# Patient Record
Sex: MALE | Race: WHITE | NOT HISPANIC OR LATINO | Employment: OTHER | URBAN - METROPOLITAN AREA
[De-identification: names, ages, dates, MRNs, and addresses within clinical notes are randomized per-mention and may not be internally consistent; named-entity substitution may affect disease eponyms.]

---

## 2017-09-28 ENCOUNTER — APPOINTMENT (OUTPATIENT)
Dept: CARDIOLOGY | Facility: HOSPITAL | Age: 67
End: 2017-09-28
Attending: INTERNAL MEDICINE

## 2017-09-28 ENCOUNTER — HOSPITAL ENCOUNTER (INPATIENT)
Facility: HOSPITAL | Age: 67
LOS: 2 days | Discharge: HOME | End: 2017-09-30
Attending: INTERNAL MEDICINE | Admitting: INTERNAL MEDICINE

## 2017-09-28 PROBLEM — I21.29 ACUTE MI, LATERAL WALL (HCC): Status: ACTIVE | Noted: 2017-09-28

## 2017-09-28 LAB
ACT BLD: 235 SECONDS (ref 82–152)
ACT BLD: 274 SECONDS (ref 82–152)
ACT BLD: 279 SECONDS (ref 82–152)
ALBUMIN SERPL-MCNC: 3.6 G/DL (ref 3.5–5.2)
ALBUMIN/GLOB SERPL: 1.5 G/DL
ALP SERPL-CCNC: 42 U/L (ref 39–117)
ALT SERPL W P-5'-P-CCNC: 24 U/L (ref 1–41)
ANION GAP SERPL CALCULATED.3IONS-SCNC: 14.1 MMOL/L
APTT PPP: >200 SECONDS (ref 22.7–35.4)
AST SERPL-CCNC: 29 U/L (ref 1–40)
BILIRUB SERPL-MCNC: 0.5 MG/DL (ref 0.1–1.2)
BUN BLD-MCNC: 25 MG/DL (ref 8–23)
BUN/CREAT SERPL: 20.7 (ref 7–25)
CALCIUM SPEC-SCNC: 8.2 MG/DL (ref 8.6–10.5)
CHLORIDE SERPL-SCNC: 100 MMOL/L (ref 98–107)
CO2 SERPL-SCNC: 20.9 MMOL/L (ref 22–29)
CREAT BLD-MCNC: 1.21 MG/DL (ref 0.76–1.27)
DEPRECATED RDW RBC AUTO: 40.6 FL (ref 37–54)
ERYTHROCYTE [DISTWIDTH] IN BLOOD BY AUTOMATED COUNT: 12.2 % (ref 11.5–14.5)
GFR SERPL CREATININE-BSD FRML MDRD: 60 ML/MIN/1.73
GLOBULIN UR ELPH-MCNC: 2.4 GM/DL
GLUCOSE BLD-MCNC: 153 MG/DL (ref 65–99)
GLUCOSE BLDC GLUCOMTR-MCNC: 115 MG/DL (ref 70–130)
HCT VFR BLD AUTO: 41.2 % (ref 40.4–52.2)
HGB BLD-MCNC: 14.2 G/DL (ref 13.7–17.6)
INR PPP: 1.61 (ref 0.9–1.1)
MCH RBC QN AUTO: 31.3 PG (ref 27–32.7)
MCHC RBC AUTO-ENTMCNC: 34.5 G/DL (ref 32.6–36.4)
MCV RBC AUTO: 90.9 FL (ref 79.8–96.2)
PLATELET # BLD AUTO: 208 10*3/MM3 (ref 140–500)
PMV BLD AUTO: 10.8 FL (ref 6–12)
POTASSIUM BLD-SCNC: 3.7 MMOL/L (ref 3.5–5.2)
PROT SERPL-MCNC: 6 G/DL (ref 6–8.5)
PROTHROMBIN TIME: 18.6 SECONDS (ref 11.7–14.2)
RBC # BLD AUTO: 4.53 10*6/MM3 (ref 4.6–6)
SODIUM BLD-SCNC: 135 MMOL/L (ref 136–145)
TROPONIN T SERPL-MCNC: 0.01 NG/ML (ref 0–0.03)
TROPONIN T SERPL-MCNC: 1.87 NG/ML (ref 0–0.03)
WBC NRBC COR # BLD: 10.96 10*3/MM3 (ref 4.5–10.7)

## 2017-09-28 PROCEDURE — 93306 TTE W/DOPPLER COMPLETE: CPT

## 2017-09-28 PROCEDURE — 80053 COMPREHEN METABOLIC PANEL: CPT | Performed by: INTERNAL MEDICINE

## 2017-09-28 PROCEDURE — 25010000002 FENTANYL CITRATE (PF) 100 MCG/2ML SOLUTION: Performed by: INTERNAL MEDICINE

## 2017-09-28 PROCEDURE — 93005 ELECTROCARDIOGRAM TRACING: CPT | Performed by: INTERNAL MEDICINE

## 2017-09-28 PROCEDURE — C1757 CATH, THROMBECTOMY/EMBOLECT: HCPCS | Performed by: INTERNAL MEDICINE

## 2017-09-28 PROCEDURE — 92941 PRQ TRLML REVSC TOT OCCL AMI: CPT | Performed by: INTERNAL MEDICINE

## 2017-09-28 PROCEDURE — C1725 CATH, TRANSLUMIN NON-LASER: HCPCS | Performed by: INTERNAL MEDICINE

## 2017-09-28 PROCEDURE — B2151ZZ FLUOROSCOPY OF LEFT HEART USING LOW OSMOLAR CONTRAST: ICD-10-PCS | Performed by: INTERNAL MEDICINE

## 2017-09-28 PROCEDURE — 99152 MOD SED SAME PHYS/QHP 5/>YRS: CPT | Performed by: INTERNAL MEDICINE

## 2017-09-28 PROCEDURE — 027135Z DILATION OF CORONARY ARTERY, TWO ARTERIES WITH TWO DRUG-ELUTING INTRALUMINAL DEVICES, PERCUTANEOUS APPROACH: ICD-10-PCS | Performed by: INTERNAL MEDICINE

## 2017-09-28 PROCEDURE — C1887 CATHETER, GUIDING: HCPCS | Performed by: INTERNAL MEDICINE

## 2017-09-28 PROCEDURE — 93306 TTE W/DOPPLER COMPLETE: CPT | Performed by: INTERNAL MEDICINE

## 2017-09-28 PROCEDURE — 25010000002 MIDAZOLAM PER 1 MG: Performed by: INTERNAL MEDICINE

## 2017-09-28 PROCEDURE — 4A023N7 MEASUREMENT OF CARDIAC SAMPLING AND PRESSURE, LEFT HEART, PERCUTANEOUS APPROACH: ICD-10-PCS | Performed by: INTERNAL MEDICINE

## 2017-09-28 PROCEDURE — 82962 GLUCOSE BLOOD TEST: CPT

## 2017-09-28 PROCEDURE — 3E073PZ INTRODUCTION OF PLATELET INHIBITOR INTO CORONARY ARTERY, PERCUTANEOUS APPROACH: ICD-10-PCS | Performed by: INTERNAL MEDICINE

## 2017-09-28 PROCEDURE — 92929 PR PRQ TRLUML CORONARY STENT W/ANGIO ADDL ART/BRNCH: CPT | Performed by: INTERNAL MEDICINE

## 2017-09-28 PROCEDURE — 85610 PROTHROMBIN TIME: CPT | Performed by: INTERNAL MEDICINE

## 2017-09-28 PROCEDURE — C1769 GUIDE WIRE: HCPCS | Performed by: INTERNAL MEDICINE

## 2017-09-28 PROCEDURE — C1894 INTRO/SHEATH, NON-LASER: HCPCS | Performed by: INTERNAL MEDICINE

## 2017-09-28 PROCEDURE — 85027 COMPLETE CBC AUTOMATED: CPT | Performed by: INTERNAL MEDICINE

## 2017-09-28 PROCEDURE — B2111ZZ FLUOROSCOPY OF MULTIPLE CORONARY ARTERIES USING LOW OSMOLAR CONTRAST: ICD-10-PCS | Performed by: INTERNAL MEDICINE

## 2017-09-28 PROCEDURE — 93458 L HRT ARTERY/VENTRICLE ANGIO: CPT | Performed by: INTERNAL MEDICINE

## 2017-09-28 PROCEDURE — 99222 1ST HOSP IP/OBS MODERATE 55: CPT | Performed by: INTERNAL MEDICINE

## 2017-09-28 PROCEDURE — 25010000002 MORPHINE PER 10 MG: Performed by: INTERNAL MEDICINE

## 2017-09-28 PROCEDURE — 25010000002 PERFLUTREN (DEFINITY) 8.476 MG IN SODIUM CHLORIDE 0.9 % 10 ML INJECTION: Performed by: INTERNAL MEDICINE

## 2017-09-28 PROCEDURE — 84484 ASSAY OF TROPONIN QUANT: CPT | Performed by: INTERNAL MEDICINE

## 2017-09-28 PROCEDURE — C1874 STENT, COATED/COV W/DEL SYS: HCPCS | Performed by: INTERNAL MEDICINE

## 2017-09-28 PROCEDURE — C9606 PERC D-E COR REVASC W AMI S: HCPCS | Performed by: INTERNAL MEDICINE

## 2017-09-28 PROCEDURE — 93010 ELECTROCARDIOGRAM REPORT: CPT | Performed by: INTERNAL MEDICINE

## 2017-09-28 PROCEDURE — 0 IOPAMIDOL PER 1 ML: Performed by: INTERNAL MEDICINE

## 2017-09-28 PROCEDURE — 85730 THROMBOPLASTIN TIME PARTIAL: CPT | Performed by: INTERNAL MEDICINE

## 2017-09-28 PROCEDURE — 25010000002 HEPARIN (PORCINE) PER 1000 UNITS: Performed by: INTERNAL MEDICINE

## 2017-09-28 PROCEDURE — 25010000002 EPTIFIBATIDE PER 5 MG: Performed by: INTERNAL MEDICINE

## 2017-09-28 PROCEDURE — 85347 COAGULATION TIME ACTIVATED: CPT

## 2017-09-28 PROCEDURE — 99153 MOD SED SAME PHYS/QHP EA: CPT | Performed by: INTERNAL MEDICINE

## 2017-09-28 PROCEDURE — C9601 PERC DRUG-EL COR STENT BRAN: HCPCS | Performed by: INTERNAL MEDICINE

## 2017-09-28 DEVICE — XIENCE ALPINE EVEROLIMUS ELUTING CORONARY STENT SYSTEM 3.00 MM X 38 MM / RAPID-EXCHANGE
Type: IMPLANTABLE DEVICE | Status: FUNCTIONAL
Brand: XIENCE ALPINE

## 2017-09-28 DEVICE — XIENCE ALPINE EVEROLIMUS ELUTING CORONARY STENT SYSTEM 3.00 MM X 18 MM / RAPID-EXCHANGE
Type: IMPLANTABLE DEVICE | Status: FUNCTIONAL
Brand: XIENCE ALPINE

## 2017-09-28 RX ORDER — EPTIFIBATIDE 0.75 MG/ML
2 INJECTION, SOLUTION INTRAVENOUS CONTINUOUS
Status: DISCONTINUED | OUTPATIENT
Start: 2017-09-28 | End: 2017-09-29

## 2017-09-28 RX ORDER — LIDOCAINE HYDROCHLORIDE 20 MG/ML
INJECTION, SOLUTION INFILTRATION; PERINEURAL AS NEEDED
Status: DISCONTINUED | OUTPATIENT
Start: 2017-09-28 | End: 2017-09-28 | Stop reason: HOSPADM

## 2017-09-28 RX ORDER — PANTOPRAZOLE SODIUM 40 MG/1
40 TABLET, DELAYED RELEASE ORAL
Status: DISCONTINUED | OUTPATIENT
Start: 2017-09-29 | End: 2017-09-30 | Stop reason: HOSPADM

## 2017-09-28 RX ORDER — ASPIRIN 81 MG/1
81 TABLET, CHEWABLE ORAL DAILY
Status: DISCONTINUED | OUTPATIENT
Start: 2017-09-29 | End: 2017-09-30 | Stop reason: HOSPADM

## 2017-09-28 RX ORDER — SODIUM CHLORIDE 9 MG/ML
INJECTION, SOLUTION INTRAVENOUS CONTINUOUS PRN
Status: DISCONTINUED | OUTPATIENT
Start: 2017-09-28 | End: 2017-09-28 | Stop reason: HOSPADM

## 2017-09-28 RX ORDER — LORATADINE 10 MG/1
CAPSULE, LIQUID FILLED ORAL
COMMUNITY

## 2017-09-28 RX ORDER — NALOXONE HCL 0.4 MG/ML
0.4 VIAL (ML) INJECTION
Status: DISCONTINUED | OUTPATIENT
Start: 2017-09-28 | End: 2017-09-30 | Stop reason: HOSPADM

## 2017-09-28 RX ORDER — MIDAZOLAM HYDROCHLORIDE 1 MG/ML
INJECTION INTRAMUSCULAR; INTRAVENOUS AS NEEDED
Status: DISCONTINUED | OUTPATIENT
Start: 2017-09-28 | End: 2017-09-28 | Stop reason: HOSPADM

## 2017-09-28 RX ORDER — EPTIFIBATIDE 0.75 MG/ML
INJECTION, SOLUTION INTRAVENOUS CONTINUOUS PRN
Status: DISCONTINUED | OUTPATIENT
Start: 2017-09-28 | End: 2017-09-28 | Stop reason: HOSPADM

## 2017-09-28 RX ORDER — ALUMINA, MAGNESIA, AND SIMETHICONE 2400; 2400; 240 MG/30ML; MG/30ML; MG/30ML
15 SUSPENSION ORAL EVERY 6 HOURS PRN
Status: DISCONTINUED | OUTPATIENT
Start: 2017-09-28 | End: 2017-09-30 | Stop reason: HOSPADM

## 2017-09-28 RX ORDER — ONDANSETRON 2 MG/ML
4 INJECTION INTRAMUSCULAR; INTRAVENOUS EVERY 6 HOURS PRN
Status: DISCONTINUED | OUTPATIENT
Start: 2017-09-28 | End: 2017-09-30 | Stop reason: HOSPADM

## 2017-09-28 RX ORDER — LISINOPRIL 5 MG/1
5 TABLET ORAL
Status: DISCONTINUED | OUTPATIENT
Start: 2017-09-28 | End: 2017-09-30 | Stop reason: HOSPADM

## 2017-09-28 RX ORDER — FENTANYL CITRATE 50 UG/ML
INJECTION, SOLUTION INTRAMUSCULAR; INTRAVENOUS AS NEEDED
Status: DISCONTINUED | OUTPATIENT
Start: 2017-09-28 | End: 2017-09-28 | Stop reason: HOSPADM

## 2017-09-28 RX ORDER — IBUPROFEN 400 MG/1
400 TABLET ORAL DAILY
COMMUNITY
End: 2017-09-30 | Stop reason: HOSPADM

## 2017-09-28 RX ORDER — FERROUS SULFATE 325(65) MG
325 TABLET ORAL
COMMUNITY

## 2017-09-28 RX ORDER — ATORVASTATIN CALCIUM 10 MG/1
10 TABLET, FILM COATED ORAL DAILY
Status: ON HOLD | COMMUNITY
End: 2017-09-30

## 2017-09-28 RX ORDER — ASPIRIN 81 MG/1
81 TABLET ORAL DAILY
COMMUNITY

## 2017-09-28 RX ORDER — MORPHINE SULFATE 2 MG/ML
1 INJECTION, SOLUTION INTRAMUSCULAR; INTRAVENOUS EVERY 4 HOURS PRN
Status: DISCONTINUED | OUTPATIENT
Start: 2017-09-28 | End: 2017-09-30 | Stop reason: HOSPADM

## 2017-09-28 RX ORDER — SODIUM CHLORIDE 9 MG/ML
75 INJECTION, SOLUTION INTRAVENOUS CONTINUOUS
Status: ACTIVE | OUTPATIENT
Start: 2017-09-28 | End: 2017-09-28

## 2017-09-28 RX ORDER — LANSOPRAZOLE 15 MG/1
15 CAPSULE, DELAYED RELEASE ORAL DAILY
COMMUNITY
End: 2017-09-30 | Stop reason: HOSPADM

## 2017-09-28 RX ORDER — HYDROCODONE BITARTRATE AND ACETAMINOPHEN 5; 325 MG/1; MG/1
1 TABLET ORAL EVERY 4 HOURS PRN
Status: DISCONTINUED | OUTPATIENT
Start: 2017-09-28 | End: 2017-09-30 | Stop reason: HOSPADM

## 2017-09-28 RX ORDER — NITROGLYCERIN 5 MG/ML
INJECTION, SOLUTION INTRAVENOUS AS NEEDED
Status: DISCONTINUED | OUTPATIENT
Start: 2017-09-28 | End: 2017-09-28 | Stop reason: HOSPADM

## 2017-09-28 RX ORDER — HEPARIN SODIUM 1000 [USP'U]/ML
INJECTION, SOLUTION INTRAVENOUS; SUBCUTANEOUS AS NEEDED
Status: DISCONTINUED | OUTPATIENT
Start: 2017-09-28 | End: 2017-09-28 | Stop reason: HOSPADM

## 2017-09-28 RX ORDER — ACETAMINOPHEN 325 MG/1
650 TABLET ORAL EVERY 4 HOURS PRN
Status: DISCONTINUED | OUTPATIENT
Start: 2017-09-28 | End: 2017-09-30 | Stop reason: HOSPADM

## 2017-09-28 RX ORDER — ATORVASTATIN CALCIUM 20 MG/1
40 TABLET, FILM COATED ORAL NIGHTLY
Status: DISCONTINUED | OUTPATIENT
Start: 2017-09-28 | End: 2017-09-30 | Stop reason: HOSPADM

## 2017-09-28 RX ADMIN — MORPHINE SULFATE 2 MG: 2 INJECTION, SOLUTION INTRAMUSCULAR; INTRAVENOUS at 18:52

## 2017-09-28 RX ADMIN — EPTIFIBATIDE 2 MCG/KG/MIN: 0.75 INJECTION, SOLUTION INTRAVENOUS at 21:40

## 2017-09-28 RX ADMIN — PERFLUTREN 2 ML: 6.52 INJECTION, SUSPENSION INTRAVENOUS at 22:14

## 2017-09-28 RX ADMIN — HYDROCODONE BITARTRATE AND ACETAMINOPHEN 1 TABLET: 5; 325 TABLET ORAL at 17:26

## 2017-09-28 RX ADMIN — SODIUM CHLORIDE 75 ML/HR: 9 INJECTION, SOLUTION INTRAVENOUS at 16:38

## 2017-09-28 RX ADMIN — LISINOPRIL 5 MG: 5 TABLET ORAL at 20:20

## 2017-09-28 RX ADMIN — ALUMINUM HYDROXIDE, MAGNESIUM HYDROXIDE, AND DIMETHICONE 15 ML: 400; 400; 40 SUSPENSION ORAL at 20:31

## 2017-09-28 RX ADMIN — SODIUM CHLORIDE 75 ML/HR: 9 INJECTION, SOLUTION INTRAVENOUS at 17:26

## 2017-09-29 LAB
ANION GAP SERPL CALCULATED.3IONS-SCNC: 15.7 MMOL/L
BH CV ECHO MEAS - ACS: 2 CM
BH CV ECHO MEAS - AO MEAN PG (FULL): 1 MMHG
BH CV ECHO MEAS - AO MEAN PG: 4 MMHG
BH CV ECHO MEAS - AO V2 MAX: 145 CM/SEC
BH CV ECHO MEAS - AO V2 MEAN: 91.6 CM/SEC
BH CV ECHO MEAS - AO V2 VTI: 30.2 CM
BH CV ECHO MEAS - AVA(I,A): 2.7 CM^2
BH CV ECHO MEAS - AVA(I,D): 2.7 CM^2
BH CV ECHO MEAS - BSA(HAYCOCK): 2.2 M^2
BH CV ECHO MEAS - BSA: 2.1 M^2
BH CV ECHO MEAS - BZI_BMI: 29.4 KILOGRAMS/M^2
BH CV ECHO MEAS - BZI_METRIC_HEIGHT: 177.8 CM
BH CV ECHO MEAS - BZI_METRIC_WEIGHT: 93 KG
BH CV ECHO MEAS - CONTRAST EF 4CH: 47.9 ML/M^2
BH CV ECHO MEAS - EDV(CUBED): 157.5 ML
BH CV ECHO MEAS - EDV(MOD-SP4): 121 ML
BH CV ECHO MEAS - EDV(TEICH): 141.3 ML
BH CV ECHO MEAS - EF(MOD-SP4): 47.9 %
BH CV ECHO MEAS - ESV(MOD-SP4): 63 ML
BH CV ECHO MEAS - IVS/LVPW: 1
BH CV ECHO MEAS - IVSD: 0.9 CM
BH CV ECHO MEAS - LA DIMENSION: 7 CM
BH CV ECHO MEAS - LAT PEAK E' VEL: 9 CM/SEC
BH CV ECHO MEAS - LV DIASTOLIC VOL/BSA (35-75): 57.4 ML/M^2
BH CV ECHO MEAS - LV MASS(C)D: 180.1 GRAMS
BH CV ECHO MEAS - LV MASS(C)DI: 85.4 GRAMS/M^2
BH CV ECHO MEAS - LV MEAN PG: 3 MMHG
BH CV ECHO MEAS - LV SYSTOLIC VOL/BSA (12-30): 29.9 ML/M^2
BH CV ECHO MEAS - LV V1 MAX: 120 CM/SEC
BH CV ECHO MEAS - LV V1 MEAN: 81 CM/SEC
BH CV ECHO MEAS - LV V1 VTI: 24.7 CM
BH CV ECHO MEAS - LVIDD: 5.4 CM
BH CV ECHO MEAS - LVLD AP4: 9 CM
BH CV ECHO MEAS - LVLS AP4: 7.6 CM
BH CV ECHO MEAS - LVOT AREA (M): 3.5 CM^2
BH CV ECHO MEAS - LVOT AREA: 3.3 CM^2
BH CV ECHO MEAS - LVOT DIAM: 2.1 CM
BH CV ECHO MEAS - LVPWD: 0.9 CM
BH CV ECHO MEAS - MED PEAK E' VEL: 7 CM/SEC
BH CV ECHO MEAS - MV A DUR: 0.14 SEC
BH CV ECHO MEAS - MV A MAX VEL: 93.3 CM/SEC
BH CV ECHO MEAS - MV DEC SLOPE: 670 CM/SEC^2
BH CV ECHO MEAS - MV DEC TIME: 0.16 SEC
BH CV ECHO MEAS - MV E MAX VEL: 86.9 CM/SEC
BH CV ECHO MEAS - MV E/A: 0.93
BH CV ECHO MEAS - MV MEAN PG: 2 MMHG
BH CV ECHO MEAS - MV P1/2T MAX VEL: 105 CM/SEC
BH CV ECHO MEAS - MV P1/2T: 45.9 MSEC
BH CV ECHO MEAS - MV V2 MEAN: 57.4 CM/SEC
BH CV ECHO MEAS - MV V2 VTI: 37.1 CM
BH CV ECHO MEAS - MVA P1/2T LCG: 2.1 CM^2
BH CV ECHO MEAS - MVA(P1/2T): 4.8 CM^2
BH CV ECHO MEAS - MVA(VTI): 2.2 CM^2
BH CV ECHO MEAS - PA ACC SLOPE: 11.3 CM/SEC^2
BH CV ECHO MEAS - PA ACC TIME: 0.12 SEC
BH CV ECHO MEAS - PA MAX PG: 3.1 MMHG
BH CV ECHO MEAS - PA PR(ACCEL): 25 MMHG
BH CV ECHO MEAS - PA V2 MAX: 88.2 CM/SEC
BH CV ECHO MEAS - PULM A REVS DUR: 0.11 SEC
BH CV ECHO MEAS - PULM A REVS VEL: 28.1 CM/SEC
BH CV ECHO MEAS - PULM DIAS VEL: 51.8 CM/SEC
BH CV ECHO MEAS - PULM S/D: 1.4
BH CV ECHO MEAS - PULM SYS VEL: 73.5 CM/SEC
BH CV ECHO MEAS - RV MEAN PG: 1 MMHG
BH CV ECHO MEAS - RV V1 MEAN: 53 CM/SEC
BH CV ECHO MEAS - RV V1 VTI: 19.3 CM
BH CV ECHO MEAS - SI(LVOT): 38.6 ML/M^2
BH CV ECHO MEAS - SI(MOD-SP4): 27.5 ML/M^2
BH CV ECHO MEAS - SV(LVOT): 81.5 ML
BH CV ECHO MEAS - SV(MOD-SP4): 58 ML
BH CV ECHO MEAS - TAPSE (>1.6): 1.7 CM2
BH CV XLRA - TDI S': 13 CM/SEC
BUN BLD-MCNC: 22 MG/DL (ref 8–23)
BUN/CREAT SERPL: 20.2 (ref 7–25)
CALCIUM SPEC-SCNC: 8.8 MG/DL (ref 8.6–10.5)
CHLORIDE SERPL-SCNC: 103 MMOL/L (ref 98–107)
CHOLEST SERPL-MCNC: 150 MG/DL (ref 0–200)
CO2 SERPL-SCNC: 23.3 MMOL/L (ref 22–29)
CREAT BLD-MCNC: 1.09 MG/DL (ref 0.76–1.27)
DEPRECATED RDW RBC AUTO: 42.2 FL (ref 37–54)
DEPRECATED RDW RBC AUTO: 42.8 FL (ref 37–54)
E/E' RATIO: 11
ERYTHROCYTE [DISTWIDTH] IN BLOOD BY AUTOMATED COUNT: 12.5 % (ref 11.5–14.5)
ERYTHROCYTE [DISTWIDTH] IN BLOOD BY AUTOMATED COUNT: 12.6 % (ref 11.5–14.5)
GFR SERPL CREATININE-BSD FRML MDRD: 68 ML/MIN/1.73
GLUCOSE BLD-MCNC: 99 MG/DL (ref 65–99)
HBA1C MFR BLD: 5.44 % (ref 4.8–5.6)
HCT VFR BLD AUTO: 42.6 % (ref 40.4–52.2)
HCT VFR BLD AUTO: 44.6 % (ref 40.4–52.2)
HDLC SERPL-MCNC: 34 MG/DL (ref 40–60)
HGB BLD-MCNC: 14.1 G/DL (ref 13.7–17.6)
HGB BLD-MCNC: 14.9 G/DL (ref 13.7–17.6)
LDLC SERPL CALC-MCNC: 95 MG/DL (ref 0–100)
LDLC/HDLC SERPL: 2.81 {RATIO}
LEFT ATRIUM VOLUME INDEX: 15 ML/M2
LEFT ATRIUM VOLUME: 36 CM3
LV EF 2D ECHO EST: 48 %
MCH RBC QN AUTO: 31.1 PG (ref 27–32.7)
MCH RBC QN AUTO: 31.2 PG (ref 27–32.7)
MCHC RBC AUTO-ENTMCNC: 33.1 G/DL (ref 32.6–36.4)
MCHC RBC AUTO-ENTMCNC: 33.4 G/DL (ref 32.6–36.4)
MCV RBC AUTO: 93.5 FL (ref 79.8–96.2)
MCV RBC AUTO: 94 FL (ref 79.8–96.2)
PLATELET # BLD AUTO: 232 10*3/MM3 (ref 140–500)
PLATELET # BLD AUTO: 234 10*3/MM3 (ref 140–500)
PMV BLD AUTO: 10.9 FL (ref 6–12)
PMV BLD AUTO: 11.1 FL (ref 6–12)
POTASSIUM BLD-SCNC: 3.9 MMOL/L (ref 3.5–5.2)
RBC # BLD AUTO: 4.53 10*6/MM3 (ref 4.6–6)
RBC # BLD AUTO: 4.77 10*6/MM3 (ref 4.6–6)
SODIUM BLD-SCNC: 142 MMOL/L (ref 136–145)
TRIGL SERPL-MCNC: 103 MG/DL (ref 0–150)
TROPONIN T SERPL-MCNC: 2.35 NG/ML (ref 0–0.03)
VLDLC SERPL-MCNC: 20.6 MG/DL (ref 5–40)
WBC NRBC COR # BLD: 11.23 10*3/MM3 (ref 4.5–10.7)
WBC NRBC COR # BLD: 11.34 10*3/MM3 (ref 4.5–10.7)

## 2017-09-29 PROCEDURE — 83036 HEMOGLOBIN GLYCOSYLATED A1C: CPT | Performed by: INTERNAL MEDICINE

## 2017-09-29 PROCEDURE — 99232 SBSQ HOSP IP/OBS MODERATE 35: CPT | Performed by: INTERNAL MEDICINE

## 2017-09-29 PROCEDURE — 84484 ASSAY OF TROPONIN QUANT: CPT | Performed by: INTERNAL MEDICINE

## 2017-09-29 PROCEDURE — 25010000002 EPTIFIBATIDE PER 5 MG: Performed by: INTERNAL MEDICINE

## 2017-09-29 PROCEDURE — 85027 COMPLETE CBC AUTOMATED: CPT | Performed by: INTERNAL MEDICINE

## 2017-09-29 PROCEDURE — 80061 LIPID PANEL: CPT | Performed by: INTERNAL MEDICINE

## 2017-09-29 PROCEDURE — 93010 ELECTROCARDIOGRAM REPORT: CPT | Performed by: INTERNAL MEDICINE

## 2017-09-29 PROCEDURE — 93005 ELECTROCARDIOGRAM TRACING: CPT | Performed by: INTERNAL MEDICINE

## 2017-09-29 PROCEDURE — 80048 BASIC METABOLIC PNL TOTAL CA: CPT | Performed by: INTERNAL MEDICINE

## 2017-09-29 RX ADMIN — PANTOPRAZOLE SODIUM 40 MG: 40 TABLET, DELAYED RELEASE ORAL at 07:23

## 2017-09-29 RX ADMIN — EPTIFIBATIDE 2 MCG/KG/MIN: 0.75 INJECTION, SOLUTION INTRAVENOUS at 03:30

## 2017-09-29 RX ADMIN — TICAGRELOR 90 MG: 90 TABLET ORAL at 17:59

## 2017-09-29 RX ADMIN — LISINOPRIL 5 MG: 5 TABLET ORAL at 09:15

## 2017-09-29 RX ADMIN — ATORVASTATIN CALCIUM 40 MG: 20 TABLET, FILM COATED ORAL at 20:52

## 2017-09-29 RX ADMIN — ASPIRIN 81 MG: 81 TABLET, CHEWABLE ORAL at 09:15

## 2017-09-29 RX ADMIN — TICAGRELOR 90 MG: 90 TABLET ORAL at 09:15

## 2017-09-30 VITALS
SYSTOLIC BLOOD PRESSURE: 123 MMHG | HEIGHT: 70 IN | TEMPERATURE: 98.7 F | WEIGHT: 205 LBS | RESPIRATION RATE: 15 BRPM | OXYGEN SATURATION: 96 % | HEART RATE: 76 BPM | BODY MASS INDEX: 29.35 KG/M2 | DIASTOLIC BLOOD PRESSURE: 69 MMHG

## 2017-09-30 PROCEDURE — 99238 HOSP IP/OBS DSCHRG MGMT 30/<: CPT | Performed by: INTERNAL MEDICINE

## 2017-09-30 PROCEDURE — G0008 ADMIN INFLUENZA VIRUS VAC: HCPCS | Performed by: INTERNAL MEDICINE

## 2017-09-30 PROCEDURE — 25010000002 INFLUENZA VAC SUBUNIT QUAD 0.5 ML SUSPENSION PREFILLED SYRINGE: Performed by: INTERNAL MEDICINE

## 2017-09-30 PROCEDURE — 90661 CCIIV3 VAC ABX FR 0.5 ML IM: CPT | Performed by: INTERNAL MEDICINE

## 2017-09-30 RX ORDER — ATORVASTATIN CALCIUM 10 MG/1
40 TABLET, FILM COATED ORAL DAILY
Qty: 30 TABLET | Refills: 3 | Status: SHIPPED | OUTPATIENT
Start: 2017-09-30 | End: 2017-10-02 | Stop reason: SDUPTHER

## 2017-09-30 RX ORDER — PANTOPRAZOLE SODIUM 40 MG/1
40 TABLET, DELAYED RELEASE ORAL DAILY
Qty: 30 TABLET | Refills: 3 | Status: SHIPPED | OUTPATIENT
Start: 2017-09-30 | End: 2018-01-25 | Stop reason: SDUPTHER

## 2017-09-30 RX ORDER — LISINOPRIL 5 MG/1
5 TABLET ORAL
Qty: 30 TABLET | Refills: 3 | Status: SHIPPED | OUTPATIENT
Start: 2017-09-30 | End: 2018-01-25 | Stop reason: SDUPTHER

## 2017-09-30 RX ADMIN — TICAGRELOR 90 MG: 90 TABLET ORAL at 08:09

## 2017-09-30 RX ADMIN — PANTOPRAZOLE SODIUM 40 MG: 40 TABLET, DELAYED RELEASE ORAL at 06:08

## 2017-09-30 RX ADMIN — LISINOPRIL 5 MG: 5 TABLET ORAL at 08:09

## 2017-09-30 RX ADMIN — A/SINGAPORE/GP1908/2015 IVR-180 (H1N1) (AN A/MICHIGAN/45/2015-LIKE VIRUS), A/SINGAPORE/GP2050/2015 (H3N2) (AN A/HONG KONG/4801/2014 - LIKE VIRUS), B/UTAH/9/2014 (A B/PHUKET/3073/2013-LIKE VIRUS), B/HONG KONG/259/2010 (A B/BRISBANE/60/08-LIKE VIRUS) 0.5 ML: 15; 15; 15; 15 INJECTION, SUSPENSION INTRAMUSCULAR at 09:35

## 2017-09-30 RX ADMIN — ASPIRIN 81 MG: 81 TABLET, CHEWABLE ORAL at 08:09

## 2017-10-02 ENCOUNTER — TELEPHONE (OUTPATIENT)
Dept: CARDIOLOGY | Facility: CLINIC | Age: 67
End: 2017-10-02

## 2017-10-02 ENCOUNTER — TRANSCRIBE ORDERS (OUTPATIENT)
Dept: CARDIAC REHAB | Facility: HOSPITAL | Age: 67
End: 2017-10-02

## 2017-10-02 DIAGNOSIS — Z95.5 STATUS POST INSERTION OF DRUG ELUTING CORONARY ARTERY STENT: Primary | ICD-10-CM

## 2017-10-02 RX ORDER — ATORVASTATIN CALCIUM 10 MG/1
40 TABLET, FILM COATED ORAL DAILY
Qty: 120 TABLET | Refills: 3 | Status: SHIPPED | OUTPATIENT
Start: 2017-10-02 | End: 2017-10-03 | Stop reason: SDUPTHER

## 2017-10-02 NOTE — PROGRESS NOTES
Case Management Discharge Note    Final Note: Per notes, home with family    Discharge Placement     No information found        Other: Other (per family)    Discharge Codes: 01  Discharge to home

## 2017-10-02 NOTE — TELEPHONE ENCOUNTER
Pt spouse called requesting Dr. Avila to call and confirm quantity on Lipitor. Spouse also requesting samples of Brilinta. Called pharmacy confirmed quantity sent via electronic. Informed pt samples of Brilinta at . Thanks, Lefty

## 2017-10-03 RX ORDER — ATORVASTATIN CALCIUM 40 MG/1
40 TABLET, FILM COATED ORAL DAILY
Qty: 30 TABLET | Refills: 5 | Status: SHIPPED | OUTPATIENT
Start: 2017-10-03 | End: 2018-04-04 | Stop reason: SDUPTHER

## 2017-10-09 ENCOUNTER — OFFICE VISIT (OUTPATIENT)
Dept: CARDIOLOGY | Facility: CLINIC | Age: 67
End: 2017-10-09

## 2017-10-09 VITALS
BODY MASS INDEX: 30.35 KG/M2 | WEIGHT: 212 LBS | SYSTOLIC BLOOD PRESSURE: 140 MMHG | HEIGHT: 70 IN | OXYGEN SATURATION: 99 % | HEART RATE: 63 BPM | DIASTOLIC BLOOD PRESSURE: 88 MMHG

## 2017-10-09 DIAGNOSIS — I25.10 CORONARY ARTERY DISEASE INVOLVING NATIVE CORONARY ARTERY OF NATIVE HEART WITHOUT ANGINA PECTORIS: Primary | ICD-10-CM

## 2017-10-09 DIAGNOSIS — Z95.5 HISTORY OF CORONARY ARTERY STENT PLACEMENT: ICD-10-CM

## 2017-10-09 PROBLEM — I21.29 ACUTE MI, LATERAL WALL (HCC): Status: RESOLVED | Noted: 2017-09-28 | Resolved: 2017-10-09

## 2017-10-09 PROCEDURE — 99213 OFFICE O/P EST LOW 20 MIN: CPT | Performed by: PHYSICIAN ASSISTANT

## 2017-10-09 PROCEDURE — 93000 ELECTROCARDIOGRAM COMPLETE: CPT | Performed by: PHYSICIAN ASSISTANT

## 2017-10-09 NOTE — PROGRESS NOTES
Date of Office Visit: 10/09/2017  Encounter Provider: TIMMY Worthington  Place of Service: [facility] CARDIOLOGY  Patient Name: Frantz Dinh  :1950    Chief Complaint   Patient presents with   • Coronary Artery Disease     1 week hospital follow up   :     HPI: Frantz Dinh is a 67 y.o. male, new to me, who presents today for blood.  Old records have been obtained and reviewed by me.  He is a patient with a past medical history significant for hypertension and hyperlipidemia.  On 2017, he was playing golf and developed chest pain.  It was associated with diaphoresis and nausea.  EMS was called and he was diagnosed with a lateral MI.  On route to the hospital he had a V. fib arrest and was defibrillated.  Cardiac catheterization showed a 100% occluded large first diagonal and an 80% mid LAD lesion.  His circumflex was normal, normal left main, and 30-40% RCA.  He underwent successful drug-eluting stent placement of the proximal to mid LAD as well as the first diagonal branch.  He did develop an early in-stent thrombosis of his diagonal stent that responded to IIbIIIa administration and balloon angioplasty.  Postoperatively he did well.  An echocardiogram showed low normal LV systolic function with an estimated EF of 40%, and akinesis of the apical anterior, apical lateral, and apex walls.  There were no significant valvular abnormalities.  His heart rate was too low to start a beta blocker, and he was discharged home on 2017 in stable condition.   Since he's been home he's been doing well.  He has a little shortness of breath on exertion, but no chest pain.  He denies any edema, palpitations, dizziness, or syncope.  He quit smoking several years ago but was using a nicotine gum.  He has not used that since his heart attack.  He is eager to get back to playing golf.    Past Medical History:   Diagnosis Date   • Acute myocardial infarction     lateral wall   •  "Bradycardia    • Chest discomfort    • Coronary artery disease    • Hypertension    • Ventricular fibrillation        Past Surgical History:   Procedure Laterality Date   • APPENDECTOMY     • CARDIAC CATHETERIZATION N/A 9/28/2017    Procedure: Left Heart Cath;  Surgeon: Brianna Avila MD;  Location:  [facility] C[facility]ASIVE [facility];  Service:    • CARDIAC CATHETERIZATION N/A 9/28/2017    Procedure: Coronary angiography;  Surgeon: Brianna Avila MD;  Location:  [facility]VAS[facility];  Service:    • CARDIAC CATHETERIZATION N/A 9/28/2017    Procedure: Stent PERLITA coronary;  Surgeon: Brianna Avila MD;  Location:  [facility];  Service:    • CARDIAC CATHETERIZATION N/A 9/28/2017    Procedure: Left ventriculography;  Surgeon: Brianna Avila MD;  Location:  [facility]AS[facility];  Service:    • CORONARY ANGIOPLASTY     • CORONARY STENT PLACEMENT     • TN RT/LT HEART CATHETERS N/A 9/28/2017    Procedure: Percutaneous Coronary Intervention;  Surgeon: Brianna Avila MD;  Location:  [facility]VAS[facility] LOC[facility];  Service: Cardiovascular       Social History     Social History   • Marital status:      Spouse name: N/A   • Number of children: N/A   • Years of education: N/A     Occupational History   • Not on file.     Social History Main Topics   • Smoking status: Former Smoker     Packs/day: 1.50     Types: Cigarettes   • Smokeless tobacco: Never Used      Comment: \"over 5 years ago\"    • Alcohol use 1.2 oz/week     1 Cans of beer, 1 Shots of liquor per week      Comment: weekly   • Drug use: No   • Sexual activity: Defer     Other Topics Concern   • Not on file     Social History Narrative       Family History   Problem Relation Age of Onset   • Heart disease Mother    • Heart attack Mother    • Heart attack Father    • Heart disease Father    • Parkinsonism Father    • No Known Problems Sister    • No Known Problems Maternal Grandmother    • No Known Problems Maternal Grandfather    • " No Known Problems Paternal Grandmother    • No Known Problems Paternal Grandfather        Review of Systems   Constitution: Negative for chills, fever, malaise/fatigue, weight gain and weight loss.   HENT: Negative for ear pain, hearing loss, nosebleeds and sore throat.    Eyes: Negative for double vision, pain and visual disturbance.   Cardiovascular: Positive for dyspnea on exertion. Negative for chest pain, irregular heartbeat, leg swelling, near-syncope, orthopnea, palpitations, paroxysmal nocturnal dyspnea and syncope.   Respiratory: Negative for cough, shortness of breath, sleep disturbances due to breathing, snoring and wheezing.    Endocrine: Negative for cold intolerance, heat intolerance and polyuria.   Skin: Negative for itching and rash.   Musculoskeletal: Negative for joint pain, joint swelling and myalgias.   Gastrointestinal: Negative for abdominal pain, diarrhea, melena, nausea and vomiting.   Genitourinary: Negative for frequency, hematuria and hesitancy.   Neurological: Negative for excessive daytime sleepiness, headaches, light-headedness, numbness, paresthesias and seizures.   Psychiatric/Behavioral: Negative for altered mental status and depression.   Allergic/Immunologic: Negative.    All other systems reviewed and are negative.      No Known Allergies      Current Outpatient Prescriptions:   •  aspirin 81 MG EC tablet, Take 81 mg by mouth Daily., Disp: , Rfl:   •  atorvastatin (LIPITOR) 40 MG tablet, Take 1 tablet by mouth Daily., Disp: 30 tablet, Rfl: 5  •  ferrous sulfate 325 (65 FE) MG tablet, Take 325 mg by mouth Daily With Breakfast., Disp: , Rfl:   •  lisinopril (PRINIVIL,ZESTRIL) 5 MG tablet, Take 1 tablet by mouth Daily., Disp: 30 tablet, Rfl: 3  •  Loratadine (CLARITIN) 10 MG capsule, Take  by mouth., Disp: , Rfl:   •  pantoprazole (PROTONIX) 40 MG EC tablet, Take 1 tablet by mouth Daily., Disp: 30 tablet, Rfl: 3  •  ticagrelor (BRILINTA) 90 MG tablet tablet, Take 1 tablet by mouth 2  "(Two) Times a Day., Disp: 60 tablet, Rfl: 0  •  metoprolol tartrate (LOPRESSOR) 25 MG tablet, Take 0.5 tablets by mouth 2 (Two) Times a Day., Disp: 30 tablet, Rfl: 11     Objective:     Vitals:    10/09/17 0834 10/09/17 0850   BP: 128/82 140/88   BP Location: Right arm Left arm   Pulse: 63    SpO2: 99%    Weight: 212 lb (96.2 kg)    Height: 70\" (177.8 cm)      Body mass index is 30.42 kg/(m^2).    PHYSICAL EXAM:    Physical Exam   Constitutional: He is oriented to person, place, and time. He appears well-developed and well-nourished. No distress.   HENT:   Head: Normocephalic and atraumatic.   Eyes: Pupils are equal, round, and reactive to light.   Neck: No JVD present. No thyromegaly present.   Cardiovascular: Normal rate, regular rhythm, normal heart sounds and intact distal pulses.    No murmur heard.  Right radial cath site well healed without erythema or echymosis, palpable proximal and distal pulses, good capillary refill   Pulmonary/Chest: Effort normal and breath sounds normal. No respiratory distress.   Abdominal: Soft. Bowel sounds are normal. He exhibits no distension. There is no splenomegaly or hepatomegaly. There is no tenderness.   Musculoskeletal: Normal range of motion. He exhibits no edema.   Neurological: He is alert and oriented to person, place, and time.   Skin: Skin is warm and dry. He is not diaphoretic. No erythema.   Psychiatric: He has a normal mood and affect. His behavior is normal. Judgment normal.         ECG 12 Lead  Date/Time: 10/9/2017 9:42 AM  Performed by: NICOL CHAVIRA.  Authorized by: NICOL CHAVIRA.   Comparison: compared with previous ECG from 9/29/2017  Similar to previous ECG  Rhythm: sinus rhythm  BPM: 63  T depression: V6, V5 and aVL  T flattening: V4  Clinical impression: abnormal ECG  Comments: Indication: Coronary artery disease, status post stent placement.              Assessment:       Diagnosis Plan   1. Coronary artery disease involving native coronary artery of " native heart without angina pectoris  ECG 12 Lead   2. History of coronary artery stent placement  ECG 12 Lead     Orders Placed This Encounter   Procedures   • ECG 12 Lead     This order was created via procedure documentation          Plan:       1.  Coronary Artery Disease  Assessment  • The patient has no angina  • There is a new diagnosis of stable angina in the past 12 months  • The patient is having symptoms consistent with unstable angina     Plan  • Lifestyle modifications discussed include adhering to a heart healthy diet, avoidance of tobacco products, medication compliance and regular exercise  • Overall he's doing well.  He has no more chest pain.  I did discuss with him regular exercise and a heart healthy diet.  I think that he can go back to playing golf as long as he rides in the golf cart.  I do not want him to exert himself until he is in cardiac rehabilitation.  We did talk about the importance of dual antiplatelet therapy.  We were unable to start him on a beta blocker in the hospital secondary to bradycardia, however his heart rate and his blood pressure are in the range today where he can tolerate a low-dose Toprol.  I'm going to start him on Lopressor 12.5 mg twice a day.  He will follow-up with Dr. Avila in 3 weeks.    Subjective - Objective  • There is a history of past MI  • There has been a previous stent procedure using PERLITA  • There has been a previous POBA  • Current antiplatelet therapy includes aspirin 81 mg and ticagrelor 90 mg  • The patient qualifies for cardiac rehabilitation, and has been referred to cardiac rehab          As always, it has been a pleasure to participate in your patient's care.      Sincerely,         Ailyn Avitia PA-C

## 2017-10-11 ENCOUNTER — TREATMENT (OUTPATIENT)
Dept: CARDIAC REHAB | Facility: HOSPITAL | Age: 67
End: 2017-10-11

## 2017-10-11 DIAGNOSIS — J44.9 COPD, MODERATE (HCC): Primary | ICD-10-CM

## 2017-10-11 PROCEDURE — 93798 PHYS/QHP OP CAR RHAB W/ECG: CPT

## 2017-10-13 ENCOUNTER — TREATMENT (OUTPATIENT)
Dept: CARDIAC REHAB | Facility: HOSPITAL | Age: 67
End: 2017-10-13

## 2017-10-13 DIAGNOSIS — I25.2 STATUS POST MYOCARDIAL INFARCTION: Primary | ICD-10-CM

## 2017-10-13 PROCEDURE — 93798 PHYS/QHP OP CAR RHAB W/ECG: CPT

## 2017-10-16 ENCOUNTER — TREATMENT (OUTPATIENT)
Dept: CARDIAC REHAB | Facility: HOSPITAL | Age: 67
End: 2017-10-16

## 2017-10-16 DIAGNOSIS — I25.2 STATUS POST MYOCARDIAL INFARCTION: Primary | ICD-10-CM

## 2017-10-16 PROCEDURE — 93798 PHYS/QHP OP CAR RHAB W/ECG: CPT

## 2017-10-18 ENCOUNTER — TREATMENT (OUTPATIENT)
Dept: CARDIAC REHAB | Facility: HOSPITAL | Age: 67
End: 2017-10-18

## 2017-10-18 DIAGNOSIS — Z95.5 STATUS POST INSERTION OF DRUG ELUTING CORONARY ARTERY STENT: ICD-10-CM

## 2017-10-18 DIAGNOSIS — I25.2 STATUS POST MYOCARDIAL INFARCTION: Primary | ICD-10-CM

## 2017-10-18 PROCEDURE — 93798 PHYS/QHP OP CAR RHAB W/ECG: CPT

## 2017-10-20 ENCOUNTER — TREATMENT (OUTPATIENT)
Dept: CARDIAC REHAB | Facility: HOSPITAL | Age: 67
End: 2017-10-20

## 2017-10-20 DIAGNOSIS — I25.2 STATUS POST MYOCARDIAL INFARCTION: Primary | ICD-10-CM

## 2017-10-20 PROCEDURE — 93798 PHYS/QHP OP CAR RHAB W/ECG: CPT

## 2017-10-23 ENCOUNTER — TREATMENT (OUTPATIENT)
Dept: CARDIAC REHAB | Facility: HOSPITAL | Age: 67
End: 2017-10-23

## 2017-10-23 DIAGNOSIS — I25.2 STATUS POST MYOCARDIAL INFARCTION: Primary | ICD-10-CM

## 2017-10-23 PROCEDURE — 93798 PHYS/QHP OP CAR RHAB W/ECG: CPT

## 2017-10-25 ENCOUNTER — TREATMENT (OUTPATIENT)
Dept: CARDIAC REHAB | Facility: HOSPITAL | Age: 67
End: 2017-10-25

## 2017-10-25 DIAGNOSIS — I25.2 STATUS POST MYOCARDIAL INFARCTION: Primary | ICD-10-CM

## 2017-10-25 PROCEDURE — 93798 PHYS/QHP OP CAR RHAB W/ECG: CPT

## 2017-10-27 ENCOUNTER — TREATMENT (OUTPATIENT)
Dept: CARDIAC REHAB | Facility: HOSPITAL | Age: 67
End: 2017-10-27

## 2017-10-27 DIAGNOSIS — I25.10 CORONARY ARTERY DISEASE INVOLVING NATIVE CORONARY ARTERY OF NATIVE HEART WITHOUT ANGINA PECTORIS: Primary | ICD-10-CM

## 2017-10-27 PROCEDURE — 93798 PHYS/QHP OP CAR RHAB W/ECG: CPT

## 2017-10-30 ENCOUNTER — TREATMENT (OUTPATIENT)
Dept: CARDIAC REHAB | Facility: HOSPITAL | Age: 67
End: 2017-10-30

## 2017-10-30 DIAGNOSIS — I25.10 CORONARY ARTERY DISEASE INVOLVING NATIVE CORONARY ARTERY OF NATIVE HEART WITHOUT ANGINA PECTORIS: Primary | ICD-10-CM

## 2017-10-30 PROCEDURE — 93798 PHYS/QHP OP CAR RHAB W/ECG: CPT

## 2017-10-31 ENCOUNTER — OFFICE VISIT (OUTPATIENT)
Dept: CARDIOLOGY | Facility: CLINIC | Age: 67
End: 2017-10-31

## 2017-10-31 VITALS
HEART RATE: 49 BPM | HEIGHT: 70 IN | BODY MASS INDEX: 30.21 KG/M2 | SYSTOLIC BLOOD PRESSURE: 122 MMHG | DIASTOLIC BLOOD PRESSURE: 86 MMHG | WEIGHT: 211 LBS

## 2017-10-31 DIAGNOSIS — Z95.5 HISTORY OF CORONARY ARTERY STENT PLACEMENT: ICD-10-CM

## 2017-10-31 DIAGNOSIS — I10 ESSENTIAL HYPERTENSION: ICD-10-CM

## 2017-10-31 DIAGNOSIS — E78.5 HYPERLIPIDEMIA, UNSPECIFIED HYPERLIPIDEMIA TYPE: ICD-10-CM

## 2017-10-31 DIAGNOSIS — I25.10 CORONARY ARTERY DISEASE INVOLVING NATIVE CORONARY ARTERY OF NATIVE HEART WITHOUT ANGINA PECTORIS: Primary | ICD-10-CM

## 2017-10-31 PROCEDURE — 99214 OFFICE O/P EST MOD 30 MIN: CPT | Performed by: INTERNAL MEDICINE

## 2017-10-31 PROCEDURE — 93000 ELECTROCARDIOGRAM COMPLETE: CPT | Performed by: INTERNAL MEDICINE

## 2017-10-31 NOTE — PROGRESS NOTES
Subjective:     Encounter Date:10/31/2017      Patient ID: Frantz Dinh is a 67 y.o. male.    Chief Complaint:  History of Present Illness    This is a 67-year-old male with a history of hypertension, hyperlipidemia, coronary artery disease status post lateral myocardial infarction and drug-eluting stent placement of his proximal first diagonal branch and proximal to mid LAD.    I saw the patient initially when he presented on 9/28/2017 with a lateral myocardial infarction.  The patient was out playing golf when at the 16th hole began developing chest pressure associated with diaphoresis and nausea.  An EKG done in the field showed evidence of a lateral myocardial infarction.  In route the patient did suffer a ventricular fibrillation arrest that did respond to defibrillation.  His fracture calculated to the cardiac catheterization laboratory and was awake and hemodynamically stable at the time.  Coronary angiogram showed a proximal occlusion of the large first diagonal branch for which she underwent drug-eluting stent placement.  Additionally he had an 80% stenosis of his mid LAD and subtotal he underwent drug eluting stent placement of the proximal to mid LAD.  His remaining coronary artery showed nonobstructive disease.  He did have an early in-stent thrombosis of his diagonal stent during the course of the procedure that responded well to balloon angioplasty and administration of IIb IIIa inhibitor.  Remainder of his hospital course was unremarkable.  He did have an echocardiogram performed that showed low normal left ventricular systolic function with an ejection fraction of 48% with akinesis of his apical anterior, apical lateral and apical walls, no significant valvular disease, and normal diastolic function.  He was discharged on 9/30/2017.  During his admission his heart rate was felt to be too low to start a beta blocker.    In follow-up he saw TIMMY Bartholomew for a one-week follow-up on 10/9/2017.   He was doing well at that time.  He was interested in going back to playing golf.  He was started on a low-dose of metoprolol tartrate 12.5 mg twice a day.  He was due to go back to playing golf as long as he did not walk the golf course and was set up for cardiac rehabilitation.    Today he presents for his one-month follow-up.  He continues to feel well.  He denies any chest pain, shortness of breath, PND or orthopnea, presyncope or syncope, palpitations, or lower extremity edema.  Back to playing golf and has not had any issues with this.  He is in his third week of cardiac rehabilitation and has been tolerating this well.  He has had no significant issues with his medications.    Review of Systems   Constitution: Positive for malaise/fatigue. Negative for weakness.   HENT: Negative for hearing loss, hoarse voice, nosebleeds and sore throat.    Eyes: Negative for pain.   Cardiovascular: Negative for chest pain, claudication, cyanosis, dyspnea on exertion, irregular heartbeat, leg swelling, near-syncope, orthopnea, palpitations, paroxysmal nocturnal dyspnea and syncope.   Respiratory: Negative for shortness of breath and snoring.    Endocrine: Negative for cold intolerance, heat intolerance, polydipsia, polyphagia and polyuria.   Skin: Negative for itching and rash.   Musculoskeletal: Negative for arthritis, falls, joint pain, joint swelling, muscle cramps, muscle weakness and myalgias.   Gastrointestinal: Negative for constipation, diarrhea, dysphagia, heartburn, hematemesis, hematochezia, melena, nausea and vomiting.   Genitourinary: Negative for frequency, hematuria and hesitancy.   Neurological: Negative for excessive daytime sleepiness, dizziness, headaches, light-headedness and numbness.   Psychiatric/Behavioral: Negative for depression. The patient is not nervous/anxious.           Current Outpatient Prescriptions:   •  aspirin 81 MG EC tablet, Take 81 mg by mouth Daily., Disp: , Rfl:   •  atorvastatin  (LIPITOR) 40 MG tablet, Take 1 tablet by mouth Daily., Disp: 30 tablet, Rfl: 5  •  ferrous sulfate 325 (65 FE) MG tablet, Take 325 mg by mouth Daily With Breakfast., Disp: , Rfl:   •  lisinopril (PRINIVIL,ZESTRIL) 5 MG tablet, Take 1 tablet by mouth Daily., Disp: 30 tablet, Rfl: 3  •  Loratadine (CLARITIN) 10 MG capsule, Take  by mouth., Disp: , Rfl:   •  metoprolol tartrate (LOPRESSOR) 25 MG tablet, Take 0.5 tablets by mouth 2 (Two) Times a Day., Disp: 30 tablet, Rfl: 11  •  pantoprazole (PROTONIX) 40 MG EC tablet, Take 1 tablet by mouth Daily., Disp: 30 tablet, Rfl: 3  •  ticagrelor (BRILINTA) 90 MG tablet tablet, Take 1 tablet by mouth 2 (Two) Times a Day., Disp: 60 tablet, Rfl: 0    Past Medical History:   Diagnosis Date   • Acute myocardial infarction     lateral wall   • Bradycardia    • Chest discomfort    • Coronary artery disease    • Hypertension    • Ventricular fibrillation      Past Surgical History:   Procedure Laterality Date   • APPENDECTOMY     • CARDIAC CATHETERIZATION N/A 9/28/2017    Procedure: Left Heart Cath;  Surgeon: Brianna Avila MD;  Location: [facility] CATH INVASIVE LOCATION;  Service:    • CARDIAC CATHETERIZATION N/A 9/28/2017    Procedure: Coronary angiography;  Surgeon: Brianna Avila MD;  Location: [facility] CATH INVASIVE LOCATION;  Service:    • CARDIAC CATHETERIZATION N/A 9/28/2017    Procedure: Stent PERLITA coronary;  Surgeon: Brianna Avila MD;  Location: [facility] CATH INVASIVE LOCATION;  Service:    • CARDIAC CATHETERIZATION N/A 9/28/2017    Procedure: Left ventriculography;  Surgeon: Brianna Avila MD;  Location: [facility] CATH INVASIVE LOCATION;  Service:    • CORONARY ANGIOPLASTY     • CORONARY STENT PLACEMENT     • NM RT/LT HEART CATHETERS N/A 9/28/2017    Procedure: Percutaneous Coronary Intervention;  Surgeon: Brianna Avila MD;  Location: [facility] CATH INVASIVE LOCATION;  Service: Cardiovascular     Family History   Problem Relation Age of Onset   • Heart disease Mother    • Heart  "attack Mother    • Heart attack Father    • Heart disease Father    • Parkinsonism Father    • No Known Problems Sister    • No Known Problems Maternal Grandmother    • No Known Problems Maternal Grandfather    • No Known Problems Paternal Grandmother    • No Known Problems Paternal Grandfather      Social History   Substance Use Topics   • Smoking status: Former Smoker     Packs/day: 1.50     Types: Cigarettes   • Smokeless tobacco: Never Used      Comment: \"over 5 years ago\"    • Alcohol use 1.2 oz/week     1 Cans of beer, 1 Shots of liquor per week      Comment: weekly           ECG 12 Lead  Date/Time: 10/31/2017 12:34 PM  Performed by: MORENA HOLLIS  Authorized by: MORENA HOLLIS   Comparison: compared with previous ECG   Similar to previous ECG  Rhythm: sinus rhythm  Comments: Non-specific inferior T wave changes               Objective:         Visit Vitals   • /86 (BP Location: Right arm, Patient Position: Sitting)   • Pulse (!) 49   • Ht 70\" (177.8 cm)   • Wt 211 lb (95.7 kg)   • BMI 30.28 kg/m2          Physical Exam   Constitutional: He is oriented to person, place, and time. He appears well-developed and well-nourished.   HENT:   Head: Normocephalic and atraumatic.   Eyes: Conjunctivae, EOM and lids are normal. Pupils are equal, round, and reactive to light.   Neck: Normal range of motion and full passive range of motion without pain. Neck supple. No JVD present. Carotid bruit is not present.   Cardiovascular: Normal rate, regular rhythm, S1 normal and S2 normal.  Exam reveals no S3 and no S4.    No murmur heard.  Pulses:       Radial pulses are 2+ on the right side, and 2+ on the left side.   No bilateral lower extremity edema   Pulmonary/Chest: Effort normal and breath sounds normal. He has no rales.   Abdominal: Soft. Normal appearance. There is no hepatomegaly.   Lymphadenopathy:     He has no cervical adenopathy.   Neurological: He is alert and oriented to person, place, and time.   Skin: " Skin is warm, dry and intact.   Psychiatric: He has a normal mood and affect.       Lab Review:       Assessment:          Diagnosis Plan   1. Coronary artery disease involving native coronary artery of native heart without angina pectoris  Adult Transthoracic Echo Complete W/ Cont if Necessary Per Protocol   2. History of coronary artery stent placement     3. Essential hypertension     4. Hyperlipidemia, unspecified hyperlipidemia type            Plan:       1.  Coronary artery disease.  He is status post drug-eluting stent placement of his proximal first diagonal branch and proximal to mid LAD.  He appears to be doing well from the standpoint.  Continue his current medical management.  2.  Mild ischemic cardiomyopathy.  We will reevaluate with a repeat echocardiogram as next visit.  3.  Hypertension.  Well controlled on his current medications.  4.  Hyperlipidemia.  On atorvastatin.    Coronary Artery Disease  Assessment  • The patient has no angina    Plan  • Lifestyle modifications discussed include adhering to a heart healthy diet, avoidance of tobacco products, maintenance of a healthy weight, medication compliance, regular exercise and regular monitoring of cholesterol and blood pressure    Subjective - Objective  • There is a history of past MI on or around 9/28/2017  • There has been a previous stent procedure using PELRITA on or around 9/28/2017  • Current antiplatelet therapy includes aspirin 81 mg and ticagrelor 90 mg  • The patient qualifies for cardiac rehabilitation, and is already participating in a cardiac rehab program        Heart Failure  Assessment  • NYHA class I - There is no limitation of physical activity. Physical activity does not cause fatigue, palpitations or shortness of breath.  • ACE inhibitor prescribed  • Beta blocker prescribed  • Diuretics not prescribed for medical reasons  • Angiotensin receptor blocker (ARB) not prescribed for medical reasons  • Calcium channel blockers not  prescribed  • Left ventricular function is mildly reduced by qualitative assessment    Plan  • The heart failure care plan was discussed with the patient today including: continuing the current program    Subjective/Objective    • Physical exam findings negative for rales, peripheral edema, elevated JVP, S3 gallop, S4 gallop and hepatomegaly.      We'll plan on seeing the patient back again in 3 months with a repeat echocardiogram.

## 2017-11-01 ENCOUNTER — TREATMENT (OUTPATIENT)
Dept: CARDIAC REHAB | Facility: HOSPITAL | Age: 67
End: 2017-11-01

## 2017-11-01 DIAGNOSIS — I25.2 STATUS POST MYOCARDIAL INFARCTION: ICD-10-CM

## 2017-11-01 DIAGNOSIS — Z95.5 STATUS POST INSERTION OF DRUG ELUTING CORONARY ARTERY STENT: ICD-10-CM

## 2017-11-01 DIAGNOSIS — I25.10 CORONARY ARTERY DISEASE INVOLVING NATIVE CORONARY ARTERY OF NATIVE HEART WITHOUT ANGINA PECTORIS: Primary | ICD-10-CM

## 2017-11-01 PROCEDURE — 93798 PHYS/QHP OP CAR RHAB W/ECG: CPT

## 2017-11-03 ENCOUNTER — HOSPITAL ENCOUNTER (OUTPATIENT)
Dept: CARDIOLOGY | Facility: HOSPITAL | Age: 67
Discharge: HOME | End: 2017-11-03
Attending: INTERNAL MEDICINE | Admitting: INTERNAL MEDICINE

## 2017-11-03 ENCOUNTER — TREATMENT (OUTPATIENT)
Dept: CARDIAC REHAB | Facility: HOSPITAL | Age: 67
End: 2017-11-03

## 2017-11-03 DIAGNOSIS — I25.10 CORONARY ARTERY DISEASE INVOLVING NATIVE CORONARY ARTERY OF NATIVE HEART WITHOUT ANGINA PECTORIS: ICD-10-CM

## 2017-11-03 DIAGNOSIS — I25.2 STATUS POST MYOCARDIAL INFARCTION: ICD-10-CM

## 2017-11-03 DIAGNOSIS — Z95.5 STATUS POST INSERTION OF DRUG ELUTING CORONARY ARTERY STENT: ICD-10-CM

## 2017-11-03 DIAGNOSIS — I25.10 CORONARY ARTERY DISEASE INVOLVING NATIVE CORONARY ARTERY OF NATIVE HEART WITHOUT ANGINA PECTORIS: Primary | ICD-10-CM

## 2017-11-03 LAB
BH CV ECHO MEAS - ACS: 2 CM
BH CV ECHO MEAS - AO MAX PG (FULL): 4.6 MMHG
BH CV ECHO MEAS - AO MAX PG: 10.1 MMHG
BH CV ECHO MEAS - AO MEAN PG (FULL): 2.3 MMHG
BH CV ECHO MEAS - AO MEAN PG: 5 MMHG
BH CV ECHO MEAS - AO ROOT AREA (BSA CORRECTED): 1.7
BH CV ECHO MEAS - AO ROOT AREA: 9.9 CM^2
BH CV ECHO MEAS - AO ROOT DIAM: 3.5 CM
BH CV ECHO MEAS - AO V2 MAX: 159 CM/SEC
BH CV ECHO MEAS - AO V2 MEAN: 101.4 CM/SEC
BH CV ECHO MEAS - AO V2 VTI: 32.1 CM
BH CV ECHO MEAS - AVA(I,A): 2.5 CM^2
BH CV ECHO MEAS - AVA(I,D): 2.5 CM^2
BH CV ECHO MEAS - AVA(V,A): 2.2 CM^2
BH CV ECHO MEAS - AVA(V,D): 2.2 CM^2
BH CV ECHO MEAS - BSA(HAYCOCK): 2.2 M^2
BH CV ECHO MEAS - BSA: 2.1 M^2
BH CV ECHO MEAS - BZI_BMI: 30.1 KILOGRAMS/M^2
BH CV ECHO MEAS - BZI_METRIC_HEIGHT: 177.8 CM
BH CV ECHO MEAS - BZI_METRIC_WEIGHT: 95.3 KG
BH CV ECHO MEAS - CONTRAST EF (2CH): 60.9 ML/M^2
BH CV ECHO MEAS - CONTRAST EF 4CH: 63.8 ML/M^2
BH CV ECHO MEAS - EDV(MOD-SP2): 69 ML
BH CV ECHO MEAS - EDV(MOD-SP4): 80 ML
BH CV ECHO MEAS - EDV(TEICH): 174.2 ML
BH CV ECHO MEAS - EF(CUBED): 83.2 %
BH CV ECHO MEAS - EF(MOD-SP2): 60.9 %
BH CV ECHO MEAS - EF(MOD-SP4): 63.8 %
BH CV ECHO MEAS - EF(TEICH): 75.3 %
BH CV ECHO MEAS - ESV(MOD-SP2): 27 ML
BH CV ECHO MEAS - ESV(MOD-SP4): 29 ML
BH CV ECHO MEAS - ESV(TEICH): 43 ML
BH CV ECHO MEAS - FS: 44.8 %
BH CV ECHO MEAS - IVS/LVPW: 1.1
BH CV ECHO MEAS - IVSD: 0.96 CM
BH CV ECHO MEAS - LAT PEAK E' VEL: 11 CM/SEC
BH CV ECHO MEAS - LV DIASTOLIC VOL/BSA (35-75): 37.5 ML/M^2
BH CV ECHO MEAS - LV MASS(C)D: 211.3 GRAMS
BH CV ECHO MEAS - LV MASS(C)DI: 99.1 GRAMS/M^2
BH CV ECHO MEAS - LV MAX PG: 5.6 MMHG
BH CV ECHO MEAS - LV MEAN PG: 2.7 MMHG
BH CV ECHO MEAS - LV SYSTOLIC VOL/BSA (12-30): 13.6 ML/M^2
BH CV ECHO MEAS - LV V1 MAX: 117.9 CM/SEC
BH CV ECHO MEAS - LV V1 MEAN: 75.3 CM/SEC
BH CV ECHO MEAS - LV V1 VTI: 26.5 CM
BH CV ECHO MEAS - LVIDD: 5.9 CM
BH CV ECHO MEAS - LVIDS: 3.3 CM
BH CV ECHO MEAS - LVLD AP2: 7.8 CM
BH CV ECHO MEAS - LVLD AP4: 7.6 CM
BH CV ECHO MEAS - LVLS AP2: 6.1 CM
BH CV ECHO MEAS - LVLS AP4: 6.5 CM
BH CV ECHO MEAS - LVOT AREA (M): 3.1 CM^2
BH CV ECHO MEAS - LVOT AREA: 3 CM^2
BH CV ECHO MEAS - LVOT DIAM: 2 CM
BH CV ECHO MEAS - LVPWD: 0.85 CM
BH CV ECHO MEAS - MED PEAK E' VEL: 8 CM/SEC
BH CV ECHO MEAS - MV A DUR: 0.12 SEC
BH CV ECHO MEAS - MV A MAX VEL: 73.2 CM/SEC
BH CV ECHO MEAS - MV DEC SLOPE: 323.3 CM/SEC^2
BH CV ECHO MEAS - MV DEC TIME: 0.27 SEC
BH CV ECHO MEAS - MV E MAX VEL: 80.5 CM/SEC
BH CV ECHO MEAS - MV E/A: 1.1
BH CV ECHO MEAS - MV MAX PG: 3.3 MMHG
BH CV ECHO MEAS - MV MEAN PG: 1.1 MMHG
BH CV ECHO MEAS - MV P1/2T MAX VEL: 81.5 CM/SEC
BH CV ECHO MEAS - MV P1/2T: 73.8 MSEC
BH CV ECHO MEAS - MV V2 MAX: 91.2 CM/SEC
BH CV ECHO MEAS - MV V2 MEAN: 48.1 CM/SEC
BH CV ECHO MEAS - MV V2 VTI: 36.6 CM
BH CV ECHO MEAS - MVA P1/2T LCG: 2.7 CM^2
BH CV ECHO MEAS - MVA(P1/2T): 3 CM^2
BH CV ECHO MEAS - MVA(VTI): 2.2 CM^2
BH CV ECHO MEAS - PA MAX PG (FULL): 3 MMHG
BH CV ECHO MEAS - PA MAX PG: 4.5 MMHG
BH CV ECHO MEAS - PA V2 MAX: 106 CM/SEC
BH CV ECHO MEAS - PULM A REVS DUR: 0.12 SEC
BH CV ECHO MEAS - PULM A REVS VEL: 29.8 CM/SEC
BH CV ECHO MEAS - PULM DIAS VEL: 45.9 CM/SEC
BH CV ECHO MEAS - PULM S/D: 1.8
BH CV ECHO MEAS - PULM SYS VEL: 83.1 CM/SEC
BH CV ECHO MEAS - PVA(V,A): 2 CM^2
BH CV ECHO MEAS - PVA(V,D): 2 CM^2
BH CV ECHO MEAS - QP/QS: 0.61
BH CV ECHO MEAS - RAP SYSTOLE: 3 MMHG
BH CV ECHO MEAS - RV MAX PG: 1.4 MMHG
BH CV ECHO MEAS - RV MEAN PG: 0.93 MMHG
BH CV ECHO MEAS - RV V1 MAX: 60.1 CM/SEC
BH CV ECHO MEAS - RV V1 MEAN: 45.4 CM/SEC
BH CV ECHO MEAS - RV V1 VTI: 13.6 CM
BH CV ECHO MEAS - RVOT AREA: 3.6 CM^2
BH CV ECHO MEAS - RVOT DIAM: 2.1 CM
BH CV ECHO MEAS - RVSP: 3 MMHG
BH CV ECHO MEAS - SI(AO): 149.2 ML/M^2
BH CV ECHO MEAS - SI(CUBED): 80.8 ML/M^2
BH CV ECHO MEAS - SI(LVOT): 37.4 ML/M^2
BH CV ECHO MEAS - SI(MOD-SP2): 19.7 ML/M^2
BH CV ECHO MEAS - SI(MOD-SP4): 23.9 ML/M^2
BH CV ECHO MEAS - SI(TEICH): 61.6 ML/M^2
BH CV ECHO MEAS - SV(AO): 317.9 ML
BH CV ECHO MEAS - SV(CUBED): 172.2 ML
BH CV ECHO MEAS - SV(LVOT): 79.7 ML
BH CV ECHO MEAS - SV(MOD-SP2): 42 ML
BH CV ECHO MEAS - SV(MOD-SP4): 51 ML
BH CV ECHO MEAS - SV(RVOT): 48.8 ML
BH CV ECHO MEAS - SV(TEICH): 131.2 ML
BH CV ECHO MEAS - TAPSE (>1.6): 1.6 CM2
BH CV ECHO MEAS - TR MAX VEL: 135.5 CM/SEC
BH CV XLRA - RV BASE: 2.9 CM
BH CV XLRA - TDI S': 12 CM/SEC
E/E' RATIO: 8.5
LEFT ATRIUM VOLUME INDEX: 26 ML/M2

## 2017-11-03 PROCEDURE — 93798 PHYS/QHP OP CAR RHAB W/ECG: CPT

## 2017-11-03 PROCEDURE — 93306 TTE W/DOPPLER COMPLETE: CPT

## 2017-11-03 PROCEDURE — 93306 TTE W/DOPPLER COMPLETE: CPT | Performed by: INTERNAL MEDICINE

## 2017-11-06 ENCOUNTER — TREATMENT (OUTPATIENT)
Dept: CARDIAC REHAB | Facility: HOSPITAL | Age: 67
End: 2017-11-06

## 2017-11-06 DIAGNOSIS — I25.2 STATUS POST MYOCARDIAL INFARCTION: ICD-10-CM

## 2017-11-06 DIAGNOSIS — Z95.5 STATUS POST INSERTION OF DRUG ELUTING CORONARY ARTERY STENT: ICD-10-CM

## 2017-11-06 DIAGNOSIS — I25.10 CORONARY ARTERY DISEASE INVOLVING NATIVE CORONARY ARTERY OF NATIVE HEART WITHOUT ANGINA PECTORIS: Primary | ICD-10-CM

## 2017-11-06 PROCEDURE — 93798 PHYS/QHP OP CAR RHAB W/ECG: CPT

## 2017-11-08 ENCOUNTER — TREATMENT (OUTPATIENT)
Dept: CARDIAC REHAB | Facility: HOSPITAL | Age: 67
End: 2017-11-08

## 2017-11-08 DIAGNOSIS — I25.2 STATUS POST MYOCARDIAL INFARCTION: Primary | ICD-10-CM

## 2017-11-08 DIAGNOSIS — Z95.5 STATUS POST INSERTION OF DRUG ELUTING CORONARY ARTERY STENT: ICD-10-CM

## 2017-11-08 PROCEDURE — 93798 PHYS/QHP OP CAR RHAB W/ECG: CPT

## 2017-11-13 ENCOUNTER — TREATMENT (OUTPATIENT)
Dept: CARDIAC REHAB | Facility: HOSPITAL | Age: 67
End: 2017-11-13

## 2017-11-13 DIAGNOSIS — I25.2 STATUS POST MYOCARDIAL INFARCTION: Primary | ICD-10-CM

## 2017-11-13 DIAGNOSIS — Z95.5 STATUS POST INSERTION OF DRUG ELUTING CORONARY ARTERY STENT: ICD-10-CM

## 2017-11-13 PROCEDURE — 93798 PHYS/QHP OP CAR RHAB W/ECG: CPT

## 2017-11-17 ENCOUNTER — TREATMENT (OUTPATIENT)
Dept: CARDIAC REHAB | Facility: HOSPITAL | Age: 67
End: 2017-11-17

## 2017-11-17 DIAGNOSIS — I25.2 STATUS POST MYOCARDIAL INFARCTION: Primary | ICD-10-CM

## 2017-11-17 PROCEDURE — 93798 PHYS/QHP OP CAR RHAB W/ECG: CPT

## 2017-11-20 ENCOUNTER — TREATMENT (OUTPATIENT)
Dept: CARDIAC REHAB | Facility: HOSPITAL | Age: 67
End: 2017-11-20

## 2017-11-20 DIAGNOSIS — I25.2 STATUS POST MYOCARDIAL INFARCTION: Primary | ICD-10-CM

## 2017-11-20 PROCEDURE — 93798 PHYS/QHP OP CAR RHAB W/ECG: CPT

## 2017-11-22 ENCOUNTER — TREATMENT (OUTPATIENT)
Dept: CARDIAC REHAB | Facility: HOSPITAL | Age: 67
End: 2017-11-22

## 2017-11-22 DIAGNOSIS — I25.2 STATUS POST MYOCARDIAL INFARCTION: Primary | ICD-10-CM

## 2017-11-22 PROCEDURE — 93798 PHYS/QHP OP CAR RHAB W/ECG: CPT

## 2017-11-27 ENCOUNTER — TREATMENT (OUTPATIENT)
Dept: CARDIAC REHAB | Facility: HOSPITAL | Age: 67
End: 2017-11-27

## 2017-11-27 DIAGNOSIS — Z95.5 STATUS POST INSERTION OF DRUG ELUTING CORONARY ARTERY STENT: ICD-10-CM

## 2017-11-27 DIAGNOSIS — I25.10 CORONARY ARTERY DISEASE INVOLVING NATIVE CORONARY ARTERY OF NATIVE HEART WITHOUT ANGINA PECTORIS: ICD-10-CM

## 2017-11-27 DIAGNOSIS — I25.2 STATUS POST MYOCARDIAL INFARCTION: Primary | ICD-10-CM

## 2017-11-27 PROCEDURE — 93798 PHYS/QHP OP CAR RHAB W/ECG: CPT

## 2017-11-29 ENCOUNTER — TREATMENT (OUTPATIENT)
Dept: CARDIAC REHAB | Facility: HOSPITAL | Age: 67
End: 2017-11-29

## 2017-11-29 DIAGNOSIS — I25.2 STATUS POST MYOCARDIAL INFARCTION: Primary | ICD-10-CM

## 2017-11-29 PROCEDURE — 93798 PHYS/QHP OP CAR RHAB W/ECG: CPT

## 2017-12-01 ENCOUNTER — TREATMENT (OUTPATIENT)
Dept: CARDIAC REHAB | Facility: HOSPITAL | Age: 67
End: 2017-12-01

## 2017-12-01 DIAGNOSIS — I25.2 STATUS POST MYOCARDIAL INFARCTION: Primary | ICD-10-CM

## 2017-12-01 PROCEDURE — 93798 PHYS/QHP OP CAR RHAB W/ECG: CPT

## 2017-12-04 ENCOUNTER — TREATMENT (OUTPATIENT)
Dept: CARDIAC REHAB | Facility: HOSPITAL | Age: 67
End: 2017-12-04

## 2017-12-04 DIAGNOSIS — I25.10 CORONARY ARTERY DISEASE INVOLVING NATIVE CORONARY ARTERY OF NATIVE HEART WITHOUT ANGINA PECTORIS: Primary | ICD-10-CM

## 2017-12-04 PROCEDURE — 93798 PHYS/QHP OP CAR RHAB W/ECG: CPT

## 2017-12-06 ENCOUNTER — APPOINTMENT (OUTPATIENT)
Dept: CARDIAC REHAB | Facility: HOSPITAL | Age: 67
End: 2017-12-06

## 2017-12-08 ENCOUNTER — APPOINTMENT (OUTPATIENT)
Dept: CARDIAC REHAB | Facility: HOSPITAL | Age: 67
End: 2017-12-08

## 2017-12-11 ENCOUNTER — APPOINTMENT (OUTPATIENT)
Dept: CARDIAC REHAB | Facility: HOSPITAL | Age: 67
End: 2017-12-11

## 2017-12-13 ENCOUNTER — APPOINTMENT (OUTPATIENT)
Dept: CARDIAC REHAB | Facility: HOSPITAL | Age: 67
End: 2017-12-13

## 2017-12-15 ENCOUNTER — APPOINTMENT (OUTPATIENT)
Dept: CARDIAC REHAB | Facility: HOSPITAL | Age: 67
End: 2017-12-15

## 2017-12-18 ENCOUNTER — APPOINTMENT (OUTPATIENT)
Dept: CARDIAC REHAB | Facility: HOSPITAL | Age: 67
End: 2017-12-18

## 2017-12-20 ENCOUNTER — APPOINTMENT (OUTPATIENT)
Dept: CARDIAC REHAB | Facility: HOSPITAL | Age: 67
End: 2017-12-20

## 2017-12-22 ENCOUNTER — APPOINTMENT (OUTPATIENT)
Dept: CARDIAC REHAB | Facility: HOSPITAL | Age: 67
End: 2017-12-22

## 2017-12-27 ENCOUNTER — APPOINTMENT (OUTPATIENT)
Dept: CARDIAC REHAB | Facility: HOSPITAL | Age: 67
End: 2017-12-27

## 2017-12-29 ENCOUNTER — APPOINTMENT (OUTPATIENT)
Dept: CARDIAC REHAB | Facility: HOSPITAL | Age: 67
End: 2017-12-29

## 2018-01-03 ENCOUNTER — APPOINTMENT (OUTPATIENT)
Dept: CARDIAC REHAB | Facility: HOSPITAL | Age: 68
End: 2018-01-03

## 2018-01-05 ENCOUNTER — APPOINTMENT (OUTPATIENT)
Dept: CARDIAC REHAB | Facility: HOSPITAL | Age: 68
End: 2018-01-05

## 2018-01-08 ENCOUNTER — APPOINTMENT (OUTPATIENT)
Dept: CARDIAC REHAB | Facility: HOSPITAL | Age: 68
End: 2018-01-08

## 2018-01-10 ENCOUNTER — APPOINTMENT (OUTPATIENT)
Dept: CARDIAC REHAB | Facility: HOSPITAL | Age: 68
End: 2018-01-10

## 2018-01-25 RX ORDER — PANTOPRAZOLE SODIUM 40 MG/1
TABLET, DELAYED RELEASE ORAL
Qty: 90 TABLET | Refills: 1 | Status: SHIPPED | OUTPATIENT
Start: 2018-01-25 | End: 2018-07-25 | Stop reason: SDUPTHER

## 2018-01-25 RX ORDER — LISINOPRIL 5 MG/1
TABLET ORAL
Qty: 90 TABLET | Refills: 1 | Status: SHIPPED | OUTPATIENT
Start: 2018-01-25 | End: 2020-04-20

## 2018-02-13 ENCOUNTER — OFFICE VISIT (OUTPATIENT)
Dept: CARDIOLOGY | Facility: CLINIC | Age: 68
End: 2018-02-13

## 2018-02-13 VITALS
BODY MASS INDEX: 31.35 KG/M2 | WEIGHT: 219 LBS | HEIGHT: 70 IN | SYSTOLIC BLOOD PRESSURE: 124 MMHG | DIASTOLIC BLOOD PRESSURE: 72 MMHG | HEART RATE: 54 BPM

## 2018-02-13 DIAGNOSIS — I25.10 CORONARY ARTERY DISEASE INVOLVING NATIVE CORONARY ARTERY OF NATIVE HEART WITHOUT ANGINA PECTORIS: Primary | ICD-10-CM

## 2018-02-13 DIAGNOSIS — K21.9 GASTROESOPHAGEAL REFLUX DISEASE, ESOPHAGITIS PRESENCE NOT SPECIFIED: ICD-10-CM

## 2018-02-13 DIAGNOSIS — G47.33 OBSTRUCTIVE SLEEP APNEA: ICD-10-CM

## 2018-02-13 DIAGNOSIS — Z95.5 HISTORY OF CORONARY ARTERY STENT PLACEMENT: ICD-10-CM

## 2018-02-13 DIAGNOSIS — I10 ESSENTIAL HYPERTENSION: ICD-10-CM

## 2018-02-13 DIAGNOSIS — E78.5 HYPERLIPIDEMIA, UNSPECIFIED HYPERLIPIDEMIA TYPE: ICD-10-CM

## 2018-02-13 PROCEDURE — 93000 ELECTROCARDIOGRAM COMPLETE: CPT | Performed by: INTERNAL MEDICINE

## 2018-02-13 PROCEDURE — 99213 OFFICE O/P EST LOW 20 MIN: CPT | Performed by: INTERNAL MEDICINE

## 2018-02-13 NOTE — PROGRESS NOTES
Subjective:     Encounter Date:02/13/2018      Patient ID: Frantz Dinh is a 67 y.o. male.    Chief Complaint:  History of Present Illness    This is a 67-year-old with a history of hypertension, hyperlipidemia, coronary artery disease status post lateral myocardial infarction and drug eluting stent placement of his proximal to mid LAD and proximal first diagonal branch who presents for follow-up.    I began following the patient when he presented on 9/28/2017 with findings of a lateral myocardial infarction.  Patient was out playing golf and while at the [facility] hole began developing chest pressure since he developed diaphoresis and nausea.  EKG performed in the field showed evidence of lateral ST elevations.  In route the patient did suffer a ventricular fibrillation arrest that did respond to defibrillation.  He was brought emergently to the cardiac catheterization laboratory where he was found to have an occlusion of his first diagonal branch and an 80% stenosis of his mid LAD.  He subsequently underwent drug-eluting stent placement of both.  His initial echocardiogram showed low normal left ventricular systolic function with an ejection fraction of 48% with apical akinesis, and no significant valvular disease.  In follow-up he is done well.  We were able to start him on a low-dose of beta blocker which she has tolerated.    Since his last follow-up he had a repeat echocardiogram performed in 11/2017 that showed normalization of his left ventricular systolic function and wall motion with an estimated ejection fraction of 64%, and no significant valvular disease.    Today he presents for routine 3 month follow-up.  He denies any significant shortness of breath but reports on occasion that he'll have some dyspnea on exertion that's intermittent and random.  He is wondering if it's related to any of his medications.  Denies any chest pain, palpitations, PND or orthopnea, presyncope or syncope, or lower extremity  edema.    Review of Systems   Constitution: Negative for fever, weakness and malaise/fatigue.   HENT: Negative for hearing loss, hoarse voice, nosebleeds and sore throat.    Eyes: Negative for pain.   Cardiovascular: Positive for dyspnea on exertion. Negative for chest pain, claudication, cyanosis, irregular heartbeat, leg swelling, near-syncope, orthopnea, palpitations, paroxysmal nocturnal dyspnea and syncope.   Respiratory: Positive for shortness of breath. Negative for hemoptysis, snoring, sputum production and wheezing.    Endocrine: Negative for cold intolerance, heat intolerance, polydipsia, polyphagia and polyuria.   Skin: Negative for itching and rash.   Musculoskeletal: Negative for arthritis, falls, joint pain, joint swelling, muscle cramps, muscle weakness, myalgias and neck pain.   Gastrointestinal: Negative for abdominal pain, constipation, diarrhea, dysphagia, heartburn, hematemesis, hematochezia, melena, nausea and vomiting.   Genitourinary: Negative for frequency, hematuria and hesitancy.   Neurological: Negative for excessive daytime sleepiness, dizziness, headaches, light-headedness and numbness.   Psychiatric/Behavioral: Negative for depression. The patient is not nervous/anxious.           Current Outpatient Prescriptions:   •  aspirin 81 MG EC tablet, Take 81 mg by mouth Daily., Disp: , Rfl:   •  atorvastatin (LIPITOR) 40 MG tablet, Take 1 tablet by mouth Daily., Disp: 30 tablet, Rfl: 5  •  ferrous sulfate 325 (65 FE) MG tablet, Take 325 mg by mouth Daily With Breakfast., Disp: , Rfl:   •  lisinopril (PRINIVIL,ZESTRIL) 5 MG tablet, TAKE ONE TABLET BY MOUTH ONCE DAILY, Disp: 90 tablet, Rfl: 1  •  Loratadine (CLARITIN) 10 MG capsule, Take  by mouth., Disp: , Rfl:   •  metoprolol tartrate (LOPRESSOR) 25 MG tablet, Take 0.5 tablets by mouth 2 (Two) Times a Day., Disp: 30 tablet, Rfl: 11  •  pantoprazole (PROTONIX) 40 MG EC tablet, TAKE ONE TABLET BY MOUTH ONCE DAILY, Disp: 90 tablet, Rfl: 1  •   "ticagrelor (BRILINTA) 90 MG tablet tablet, Take 1 tablet by mouth 2 (Two) Times a Day., Disp: 60 tablet, Rfl: 0    Past Medical History:   Diagnosis Date   • Acute myocardial infarction     lateral wall   • Bradycardia    • Chest discomfort    • Coronary artery disease    • Hypertension    • Ventricular fibrillation      Past Surgical History:   Procedure Laterality Date   • APPENDECTOMY     • CARDIAC CATHETERIZATION N/A 9/28/2017    Procedure: Left Heart Cath;  Surgeon: Brianna Avila MD;  Location:  [facility];  Service:    • CARDIAC CATHETERIZATION N/A 9/28/2017    Procedure: Coronary angiography;  Surgeon: Brianna Avila MD;  Location:  [facility];  Service:    • CARDIAC CATHETERIZATION N/A 9/28/2017    Procedure: Stent PERLITA coronary;  Surgeon: Brinana Avila MD;  Location:  [facility];  Service:    • CARDIAC CATHETERIZATION N/A 9/28/2017    Procedure: Left ventriculography;  Surgeon: Brianna Avila MD;  Location:  [facility];  Service:    • CORONARY ANGIOPLASTY     • CORONARY STENT PLACEMENT     • AL RT/LT HEART CATHETERS N/A 9/28/2017    Procedure: Percutaneous Coronary Intervention;  Surgeon: Brianna Avila MD;  Location:  [facility];  Service: Cardiovascular     Family History   Problem Relation Age of Onset   • Heart disease Mother    • Heart attack Mother    • Heart attack Father    • Heart disease Father    • Parkinsonism Father    • No Known Problems Sister    • No Known Problems Maternal Grandmother    • No Known Problems Maternal Grandfather    • No Known Problems Paternal Grandmother    • No Known Problems Paternal Grandfather      Social History   Substance Use Topics   • Smoking status: Former Smoker     Packs/day: 1.50     Types: Cigarettes   • Smokeless tobacco: Never Used      Comment: \"over 5 years ago\"    • Alcohol use 1.2 oz/week     1 Cans of beer, 1 Shots of liquor per week      Comment: weekly " "          ECG 12 Lead  Date/Time: 2/13/2018 12:10 PM  Performed by: MORENA HOLLIS  Authorized by: MORENA HOLLIS   Comparison: compared with previous ECG   Similar to previous ECG  Rhythm: sinus rhythm               Objective:         Visit Vitals   • /72 (BP Location: Right arm, Patient Position: Sitting)   • Pulse 54   • Ht 177.8 cm (70\")   • Wt 99.3 kg (219 lb)   • BMI 31.42 kg/m2          Physical Exam   Constitutional: He is oriented to person, place, and time. He appears well-developed and well-nourished.   HENT:   Head: Normocephalic and atraumatic.   Eyes: Conjunctivae, EOM and lids are normal. Pupils are equal, round, and reactive to light.   Neck: Normal range of motion and full passive range of motion without pain. Neck supple. No JVD present. Carotid bruit is not present.   Cardiovascular: Normal rate, regular rhythm, S1 normal and S2 normal.  Exam reveals no gallop.    No murmur heard.  Pulses:       Radial pulses are 2+ on the right side, and 2+ on the left side.   No bilateral lower extremity edema   Pulmonary/Chest: Effort normal and breath sounds normal.   Abdominal: Soft. Normal appearance.   Lymphadenopathy:     He has no cervical adenopathy.   Neurological: He is alert and oriented to person, place, and time.   Skin: Skin is warm, dry and intact.   Psychiatric: He has a normal mood and affect.       Lab Review:       Assessment:          Diagnosis Plan   1. Coronary artery disease involving native coronary artery of native heart without angina pectoris     2. History of coronary artery stent placement     3. Essential hypertension     4. Hyperlipidemia, unspecified hyperlipidemia type     5. Gastroesophageal reflux disease, esophagitis presence not specified     6. Obstructive sleep apnea            Plan:       1.  Coronary artery disease.  Continue current medical management.  2.  Intermittent dyspnea on exertion.  Could be related to his metoprolol tartrate although this is low-dose.  " The symptoms can also be related to Ticagrelor.  For the time being we'll just monitor his symptoms.  If they progress will consider holding his metoprolol before making any changes to his antiplatelet therapy.  3.  Hypertension.  Well-controlled on his current medications.  4.  Hyperlipidemia.  On atorvastatin.  5.  Obstructive sleep apnea    We'll plan on seeing the patient back again in 6 months.    Coronary Artery Disease  Assessment  • The patient has no angina    Plan  • Lifestyle modifications discussed include adhering to a heart healthy diet, avoidance of tobacco products, maintenance of a healthy weight, medication compliance, regular exercise and regular monitoring of cholesterol and blood pressure    Subjective - Objective  • There is a history of past MI 9/2017  • There has been a previous stent procedure using PERLITA 9/2017  • Current antiplatelet therapy includes aspirin 81 mg and ticagrelor 90 mg  • The patient qualifies for cardiac rehabilitation, and has completed a cardiac rehab program

## 2018-04-04 RX ORDER — ATORVASTATIN CALCIUM 40 MG/1
TABLET, FILM COATED ORAL
Qty: 30 TABLET | Refills: 5 | Status: SHIPPED | OUTPATIENT
Start: 2018-04-04 | End: 2018-09-04 | Stop reason: SDUPTHER

## 2018-06-08 ENCOUNTER — TELEPHONE (OUTPATIENT)
Dept: CARDIOLOGY | Facility: CLINIC | Age: 68
End: 2018-06-08

## 2018-06-08 NOTE — TELEPHONE ENCOUNTER
Pt called stating he went to see PCP, Lisinopril was increased from 5 mg to 25 mg due to increased /85, was not informed of HR was just told HR was normal. Pt states BP has been ranging 140's, high 80's for a few days. Pt taking 25 mg of Metoprolol 0.5 mg BID. No other symptoms with increased BP. Pt was last seen in office on 2/13/18. Pt requesting to know is it ok to take adjusted lisinopril? Please advise! Thanks, Lefty

## 2018-07-25 RX ORDER — PANTOPRAZOLE SODIUM 40 MG/1
TABLET, DELAYED RELEASE ORAL
Qty: 90 TABLET | Refills: 1 | Status: SHIPPED | OUTPATIENT
Start: 2018-07-25

## 2018-08-14 ENCOUNTER — OFFICE VISIT (OUTPATIENT)
Dept: CARDIOLOGY | Facility: CLINIC | Age: 68
End: 2018-08-14

## 2018-08-14 VITALS
HEIGHT: 70 IN | HEART RATE: 50 BPM | SYSTOLIC BLOOD PRESSURE: 124 MMHG | DIASTOLIC BLOOD PRESSURE: 72 MMHG | BODY MASS INDEX: 31.78 KG/M2 | WEIGHT: 222 LBS

## 2018-08-14 DIAGNOSIS — E78.5 HYPERLIPIDEMIA, UNSPECIFIED HYPERLIPIDEMIA TYPE: ICD-10-CM

## 2018-08-14 DIAGNOSIS — I25.10 CORONARY ARTERY DISEASE INVOLVING NATIVE CORONARY ARTERY OF NATIVE HEART WITHOUT ANGINA PECTORIS: Primary | ICD-10-CM

## 2018-08-14 DIAGNOSIS — Z95.5 HISTORY OF CORONARY ARTERY STENT PLACEMENT: ICD-10-CM

## 2018-08-14 DIAGNOSIS — I10 ESSENTIAL HYPERTENSION: ICD-10-CM

## 2018-08-14 PROCEDURE — 99214 OFFICE O/P EST MOD 30 MIN: CPT | Performed by: INTERNAL MEDICINE

## 2018-08-14 PROCEDURE — 93000 ELECTROCARDIOGRAM COMPLETE: CPT | Performed by: INTERNAL MEDICINE

## 2018-08-14 NOTE — PROGRESS NOTES
Subjective:     Encounter Date:08/14/2018      Patient ID: Frantz Dinh is a 67 y.o. male.    Chief Complaint:  History of Present Illness    This is a 67-year-old with a history of hypertension, hyperlipidemia, coronary artery disease status post lateral myocardial infarction and drug eluting stent placement of his proximal to mid LAD and proximal first diagonal branch, who presents for follow-up.     I began following the patient when he presented on 9/28/2017 with findings of a lateral myocardial infarction.  Patient was out playing golf and while at the [address] [place] [address] began developing chest pressure since he developed diaphoresis and nausea.  EKG performed in the field showed evidence of lateral ST elevations.  In route the patient did suffer a ventricular fibrillation arrest that did respond to defibrillation.  He was brought emergently to the cardiac catheterization laboratory where he was found to have an occlusion of his first diagonal branch and an 80% stenosis of his mid LAD.  He subsequently underwent drug-eluting stent placement of both.  His initial echocardiogram showed low normal left ventricular systolic function with an ejection fraction of 48% with apical akinesis, and no significant valvular disease.  In follow-up he is done well.  We were able to start him on a low-dose of beta blocker which she has tolerated.     Since his last follow-up he had a repeat echocardiogram performed in 11/2017 that showed normalization of his left ventricular systolic function and wall motion with an estimated ejection fraction of 64%, and no significant valvular disease.     Today presents for routine 6 month follow-up.  Continues to have intermittent episodes of shortness of breath that occur randomly at rest.  He also reported some brief episodes of sharp left chest and midsternal chest pain that is different from the pain he had with his heart attack.  The symptoms also occur mainly at rest.  He has remained  active without any significant issues.  He denies any PND or orthopnea, near-syncope or syncope, palpitations, or lower extremity edema.  He has been compliant with all his medications.  He did have a lipid panel performed with Dr. Ballesteros back in 5/2018 that showed his cholesterol to be a goal with a total cholesterol 125, triglycerides of 94, HDL of 38, and LDL of 68.    Review of Systems   Constitution: Negative for weakness and malaise/fatigue.   HENT: Negative for hearing loss, hoarse voice, nosebleeds and sore throat.    Eyes: Negative for pain.   Cardiovascular: Positive for chest pain. Negative for claudication, cyanosis, dyspnea on exertion, irregular heartbeat, leg swelling, near-syncope, orthopnea, palpitations, paroxysmal nocturnal dyspnea and syncope.   Respiratory: Positive for shortness of breath. Negative for snoring.    Endocrine: Negative for cold intolerance, heat intolerance, polydipsia, polyphagia and polyuria.   Skin: Negative for itching and rash.   Musculoskeletal: Negative for arthritis, falls, joint pain, joint swelling, muscle cramps, muscle weakness and myalgias.   Gastrointestinal: Negative for constipation, diarrhea, dysphagia, heartburn, hematemesis, hematochezia, melena, nausea and vomiting.   Genitourinary: Negative for frequency, hematuria and hesitancy.   Neurological: Negative for excessive daytime sleepiness, dizziness, headaches, light-headedness and numbness.   Psychiatric/Behavioral: Negative for depression. The patient is not nervous/anxious.           Current Outpatient Prescriptions:   •  aspirin 81 MG EC tablet, Take 81 mg by mouth Daily., Disp: , Rfl:   •  atorvastatin (LIPITOR) 40 MG tablet, TAKE ONE TABLET BY MOUTH ONCE DAILY, Disp: 30 tablet, Rfl: 5  •  ferrous sulfate 325 (65 FE) MG tablet, Take 325 mg by mouth Daily With Breakfast., Disp: , Rfl:   •  lisinopril (PRINIVIL,ZESTRIL) 5 MG tablet, TAKE ONE TABLET BY MOUTH ONCE DAILY, Disp: 90 tablet, Rfl: 1  •   Loratadine (CLARITIN) 10 MG capsule, Take  by mouth., Disp: , Rfl:   •  metoprolol tartrate (LOPRESSOR) 25 MG tablet, Take 0.5 tablets by mouth 2 (Two) Times a Day., Disp: 30 tablet, Rfl: 11  •  pantoprazole (PROTONIX) 40 MG EC tablet, TAKE ONE TABLET BY MOUTH ONCE DAILY, Disp: 90 tablet, Rfl: 1  •  ticagrelor (BRILINTA) 90 MG tablet tablet, Lot # [id number] Exp 09/2020, Disp: 48 tablet, Rfl: 0    Past Medical History:   Diagnosis Date   • Acute myocardial infarction     lateral wall   • Bradycardia    • Chest discomfort    • Coronary artery disease    • Hypertension    • Ventricular fibrillation (CMS/HCC)      Past Surgical History:   Procedure Laterality Date   • APPENDECTOMY     • CARDIAC CATHETERIZATION N/A 9/28/2017    Procedure: Left Heart Cath;  Surgeon: Brianna Avila MD;  Location: [facility] CATH INVASIVE LOCATION;  Service:    • CARDIAC CATHETERIZATION N/A 9/28/2017    Procedure: Coronary angiography;  Surgeon: Brianna Avila MD;  Location: [facility] CATH INVASIVE LOCATION;  Service:    • CARDIAC CATHETERIZATION N/A 9/28/2017    Procedure: Stent PERLITA coronary;  Surgeon: Brianna Avila MD;  Location: [facility] CATH INVASIVE LOCATION;  Service:    • CARDIAC CATHETERIZATION N/A 9/28/2017    Procedure: Left ventriculography;  Surgeon: Brianna Avila MD;  Location: [facility] CATH [facility];  Service:    • CORONARY ANGIOPLASTY     • CORONARY STENT PLACEMENT     • [place] RT/LT HEART CATHETERS N/A 9/28/2017    Procedure: Percutaneous Coronary Intervention;  Surgeon: Brianna Avila MD;  Location: [facility] CATH INVASIVE LOCATION;  Service: Cardiovascular     Family History   Problem Relation Age of Onset   • Heart disease Mother    • Heart attack Mother    • Heart attack Father    • Heart disease Father    • Parkinsonism Father    • No Known Problems Sister    • No Known Problems Maternal Grandmother    • No Known Problems Maternal Grandfather    • No Known Problems Paternal Grandmother    • No Known Problems Paternal  "Grandfather      Social History   Substance Use Topics   • Smoking status: Former Smoker     Packs/day: 1.50     Types: Cigarettes   • Smokeless tobacco: Never Used      Comment: \"over 5 years ago\"    • Alcohol use 1.2 oz/week     1 Cans of beer, 1 Shots of liquor per week      Comment: weekly           ECG 12 Lead  Date/Time: 8/14/2018 11:29 AM  Performed by: MORENA HOLLIS  Authorized by: MORENA HOLLIS   Comparison: compared with previous ECG   Similar to previous ECG  Rhythm: sinus rhythm               Objective:         Visit Vitals  /72 (BP Location: Right arm, Patient Position: Sitting)   Pulse 50   Ht 177.8 cm (70\")   Wt 101 kg (222 lb)   BMI 31.85 kg/m²          Physical Exam   Constitutional: He is oriented to person, place, and time. He appears well-developed and well-nourished.   HENT:   Head: Normocephalic and atraumatic.   Neck: No JVD present. Carotid bruit is not present.   Cardiovascular: Normal rate, regular rhythm, S1 normal and S2 normal.  Exam reveals no gallop.    No murmur heard.  Pulses:       Radial pulses are 2+ on the right side, and 2+ on the left side.   No bilateral lower extremity edema   Pulmonary/Chest: Effort normal and breath sounds normal.   Abdominal: Soft. Normal appearance.   Neurological: He is alert and oriented to person, place, and time.   Skin: Skin is warm, dry and intact.   Psychiatric: He has a normal mood and affect.       Lab Review:       Assessment:          Diagnosis Plan   1. Coronary artery disease involving native coronary artery of native heart without angina pectoris     2. History of coronary artery stent placement     3. Essential hypertension     4. Hyperlipidemia, unspecified hyperlipidemia type            Plan:       1.  Coronary artery disease.  He is status post lateral infarction and drug eluding stent placement of both his proximal LAD and first diagonal branch.  Would like to keep him on Ticagrelor for a full year.  Because of the location " and the presence of more than 1 stent in light to keep him on dual antiplatelet therapy for longer, possibly indefinitely as long as he tolerates.  We'll plan on switching him to clopidogrel following the first year of therapy.  Continue remaining medical management.  2.  Hypertension.  Well-controlled on his current medications.    3.  Hyperlipidemia.  Lipids are at goal and his current dose of atorvastatin.  4.  Dyspnea.  Based on his symptoms I suspect this is due to ticagrelor which is known to cause these symptoms.  He suggested he try taking his pills with caffeine to see if this would offset the symptoms.    We'll plan on seeing the patient back again in 2 months at which point we'll discuss switching his Ticagrelor to clopidogrel.    Coronary Artery Disease  Assessment  • The patient has no angina    Plan  • Lifestyle modifications discussed include adhering to a heart healthy diet, avoidance of tobacco products, maintenance of a healthy weight, medication compliance, regular exercise and regular monitoring of cholesterol and blood pressure    Subjective - Objective  • There is a history of past MI 9/2017  • There has been a previous stent procedure using PERLITA 9/2017  • Current antiplatelet therapy includes aspirin 81 mg and ticagrelor 90 mg  • The patient qualifies for cardiac rehabilitation, and has completed a cardiac rehab program

## 2018-08-15 DIAGNOSIS — I25.10 CORONARY ARTERY DISEASE INVOLVING NATIVE CORONARY ARTERY OF NATIVE HEART WITHOUT ANGINA PECTORIS: ICD-10-CM

## 2018-09-04 RX ORDER — ATORVASTATIN CALCIUM 40 MG/1
40 TABLET, FILM COATED ORAL DAILY
Qty: 90 TABLET | Refills: 0 | Status: SHIPPED | OUTPATIENT
Start: 2018-09-04 | End: 2019-01-03 | Stop reason: SDUPTHER

## 2018-10-16 ENCOUNTER — OFFICE VISIT (OUTPATIENT)
Dept: CARDIOLOGY | Facility: CLINIC | Age: 68
End: 2018-10-16

## 2018-10-16 VITALS
SYSTOLIC BLOOD PRESSURE: 152 MMHG | HEIGHT: 69 IN | DIASTOLIC BLOOD PRESSURE: 80 MMHG | WEIGHT: 231 LBS | BODY MASS INDEX: 34.21 KG/M2 | HEART RATE: 43 BPM

## 2018-10-16 DIAGNOSIS — I25.10 CORONARY ARTERY DISEASE INVOLVING NATIVE CORONARY ARTERY OF NATIVE HEART WITHOUT ANGINA PECTORIS: ICD-10-CM

## 2018-10-16 DIAGNOSIS — G47.33 OBSTRUCTIVE SLEEP APNEA: ICD-10-CM

## 2018-10-16 DIAGNOSIS — Z95.5 HISTORY OF CORONARY ARTERY STENT PLACEMENT: ICD-10-CM

## 2018-10-16 DIAGNOSIS — I10 ESSENTIAL HYPERTENSION: ICD-10-CM

## 2018-10-16 DIAGNOSIS — E78.5 HYPERLIPIDEMIA, UNSPECIFIED HYPERLIPIDEMIA TYPE: ICD-10-CM

## 2018-10-16 DIAGNOSIS — R06.09 DYSPNEA ON EXERTION: Primary | ICD-10-CM

## 2018-10-16 PROCEDURE — 99214 OFFICE O/P EST MOD 30 MIN: CPT | Performed by: INTERNAL MEDICINE

## 2018-10-16 PROCEDURE — 93000 ELECTROCARDIOGRAM COMPLETE: CPT | Performed by: INTERNAL MEDICINE

## 2018-10-16 RX ORDER — CLOPIDOGREL BISULFATE 75 MG/1
75 TABLET ORAL DAILY
Qty: 30 TABLET | Refills: 11 | Status: SHIPPED | OUTPATIENT
Start: 2018-10-16 | End: 2019-09-30 | Stop reason: SDUPTHER

## 2018-10-16 NOTE — PROGRESS NOTES
Subjective:     Encounter Date:10/16/2018      Patient ID: Frantz Dinh is a 68 y.o. male.    Chief Complaint:  History of Present Illness    This is a 68-year-old with a history of hypertension, hyperlipidemia, coronary artery disease status post lateral myocardial infarction and drug eluting stent placement of his proximal to mid LAD and proximal first diagonal branch, who presents for follow-up.     I began following the patient when he presented on 9/28/2017 with findings of a lateral myocardial infarction.  Patient was out playing golf and while at the [address] [place] [address] began developing chest pressure since he developed diaphoresis and nausea.  EKG performed in the field showed evidence of lateral ST elevations.  In route the patient did suffer a ventricular fibrillation arrest that did respond to defibrillation.  He was brought emergently to the cardiac catheterization laboratory where he was found to have an occlusion of his first diagonal branch and an 80% stenosis of his mid LAD.  He subsequently underwent drug-eluting stent placement of both.  His initial echocardiogram showed low normal left ventricular systolic function with an ejection fraction of 48% with apical akinesis, and no significant valvular disease.  In follow-up he is done well.  We were able to start him on a low-dose of beta blocker which she has tolerated.     Since his last follow-up he had a repeat echocardiogram performed in 11/2017 that showed normalization of his left ventricular systolic function and wall motion with an estimated ejection fraction of 64%, and no significant valvular disease.  When I saw him last in 8/2018 he reported having intermittent episodes of shortness of breath that occur randomly at rest.  He also reported some brief episodes of sharp left chest and midsternal chest pain that is different from the pain he had with his heart attack.    He did have a lipid panel performed with Dr. Ballesteros back in 5/2018 that  showed his cholesterol to be a goal with a total cholesterol 125, triglycerides of 94, HDL of 38, and LDL of 68.  At the time and felt that his chest pain symptoms were atypical and his shortness of breath may be due to Ticagrelor.  Recommended just monitor his symptoms at that time.    Today presents for routine follow-up.  He continues to have issues with shortness of breath and now also dyspnea with exertion.  He feels like his symptoms have been progressing.  He also continues to have sharp chest pain that lasts just a few seconds.  He also has episodes of chest pressure that began late last a few seconds.  The symptoms can occur at rest and with activity.  He still plays golf on a regular basis about 3 times a week.  He does not do any regular, formal exercise but reports that he has been doing a lot of yard work.  His weight has gone up since I saw him last from 222-231 pounds.  He reports that he is eating better now but cannot understand why he is gaining weight.  Denies any palpitations, PND or orthopnea, near syncope or syncope, or lower extremity edema.  He has some lightheadedness with position changes.    Review of Systems   Constitution: Negative for weakness and malaise/fatigue.   HENT: Negative for hearing loss, hoarse voice, nosebleeds and sore throat.    Eyes: Negative for pain.   Cardiovascular: Positive for chest pain and dyspnea on exertion. Negative for claudication, cyanosis, irregular heartbeat, leg swelling, near-syncope, orthopnea, palpitations, paroxysmal nocturnal dyspnea and syncope.   Respiratory: Positive for shortness of breath. Negative for snoring.    Endocrine: Negative for cold intolerance, heat intolerance, polydipsia, polyphagia and polyuria.   Skin: Negative for itching and rash.   Musculoskeletal: Positive for joint swelling. Negative for arthritis, falls, joint pain, muscle cramps, muscle weakness and myalgias.   Gastrointestinal: Negative for constipation, diarrhea,  dysphagia, heartburn, hematemesis, hematochezia, melena, nausea and vomiting.   Genitourinary: Negative for frequency, hematuria and hesitancy.   Neurological: Positive for light-headedness. Negative for excessive daytime sleepiness, dizziness, headaches and numbness.   Psychiatric/Behavioral: Negative for depression. The patient is not nervous/anxious.           Current Outpatient Prescriptions:   •  aspirin 81 MG EC tablet, Take 81 mg by mouth Daily., Disp: , Rfl:   •  atorvastatin (LIPITOR) 40 MG tablet, Take 1 tablet by mouth Daily., Disp: 90 tablet, Rfl: 0  •  ferrous sulfate 325 (65 FE) MG tablet, Take 325 mg by mouth Daily With Breakfast., Disp: , Rfl:   •  lisinopril (PRINIVIL,ZESTRIL) 5 MG tablet, TAKE ONE TABLET BY MOUTH ONCE DAILY, Disp: 90 tablet, Rfl: 1  •  Loratadine (CLARITIN) 10 MG capsule, Take  by mouth., Disp: , Rfl:   •  metoprolol tartrate (LOPRESSOR) 25 MG tablet, TAKE ONE-HALF TABLET BY MOUTH TWICE DAILY, Disp: 90 tablet, Rfl: 0  •  pantoprazole (PROTONIX) 40 MG EC tablet, TAKE ONE TABLET BY MOUTH ONCE DAILY, Disp: 90 tablet, Rfl: 1  •  ticagrelor (BRILINTA) 90 MG tablet tablet, Take 1 tablet by mouth 2 (Two) Times a Day., Disp: 60 tablet, Rfl: 2    Past Medical History:   Diagnosis Date   • Acute myocardial infarction (CMS/HCC)     lateral wall   • Bradycardia    • Chest discomfort    • Coronary artery disease    • Hypertension    • Ventricular fibrillation (CMS/HCC)      Past Surgical History:   Procedure Laterality Date   • APPENDECTOMY     • CARDIAC CATHETERIZATION N/A 9/28/2017    Procedure: Left Heart Cath;  Surgeon: Brianna Avila MD;  Location: [facility] CATH INVASIVE LOCATION;  Service:    • CARDIAC CATHETERIZATION N/A 9/28/2017    Procedure: Coronary angiography;  Surgeon: Brianna Avila MD;  Location: [facility] CATH INVASIVE LOCATION;  Service:    • CARDIAC CATHETERIZATION N/A 9/28/2017    Procedure: Stent PERLITA coronary;  Surgeon: Brianna Avila MD;  Location: [facility] CATH INVASIVE  "LOCATION;  Service:    • CARDIAC CATHETERIZATION N/A 9/28/2017    Procedure: Left ventriculography;  Surgeon: Morena Hollis MD;  Location:  [facility] CATH INVASIVE LOCATION;  Service:    • CORONARY ANGIOPLASTY     • CORONARY STENT PLACEMENT     • ND RT/LT HEART CATHETERS N/A 9/28/2017    Procedure: Percutaneous Coronary Intervention;  Surgeon: Morena Hollis MD;  Location:  [facility] CATH INVASIVE LOCATION;  Service: Cardiovascular     Family History   Problem Relation Age of Onset   • Heart disease Mother    • Heart attack Mother    • Heart attack Father    • Heart disease Father    • Parkinsonism Father    • No Known Problems Sister    • No Known Problems Maternal Grandmother    • No Known Problems Maternal Grandfather    • No Known Problems Paternal Grandmother    • No Known Problems Paternal Grandfather      Social History   Substance Use Topics   • Smoking status: Former Smoker     Packs/day: 1.50     Types: Cigarettes   • Smokeless tobacco: Never Used      Comment: \"over 5 years ago\"    • Alcohol use 1.2 oz/week     1 Cans of beer, 1 Shots of liquor per week      Comment: weekly           ECG 12 Lead  Date/Time: 10/16/2018 11:23 AM  Performed by: MORENA HOLLIS  Authorized by: MORENA HOLLIS   Comparison: compared with previous ECG   Similar to previous ECG  Rhythm: sinus bradycardia               Objective:         Visit Vitals  /80 (BP Location: Right arm, Patient Position: Sitting)   Pulse (!) 43   Ht 175.3 cm (69\")   Wt 105 kg (231 lb)   BMI 34.11 kg/m²          Physical Exam   Constitutional: He is oriented to person, place, and time. He appears well-developed and well-nourished.   HENT:   Head: Normocephalic and atraumatic.   Neck: No JVD present. Carotid bruit is not present.   Cardiovascular: Regular rhythm, S1 normal and S2 normal.  Bradycardia present.  Exam reveals no gallop.    No murmur heard.  Pulses:       Radial pulses are 2+ on the right side, and 2+ on the left side.   No bilateral lower " extremity edema   Pulmonary/Chest: Effort normal and breath sounds normal.   Abdominal: Soft. Normal appearance.   Neurological: He is alert and oriented to person, place, and time.   Skin: Skin is warm, dry and intact.   Psychiatric: He has a normal mood and affect.       Lab Review:       Assessment:          Diagnosis Plan   1. Coronary artery disease involving native coronary artery of native heart without angina pectoris     2. History of coronary artery stent placement     3. Essential hypertension     4. Hyperlipidemia, unspecified hyperlipidemia type     5. Obstructive sleep apnea            Plan:       1.  Coronary artery disease.  He is status post prior lateral myocardial infarction and drug-eluting stent placement of his proximal to mid LAD and proximal first diagonal branch.  He has atypical sounding symptoms of chest discomfort that last only a few seconds.  He also has issues with dyspnea both at rest and with exertion.  Symptoms I think are still related to the Ticagrelor I cannot completely rule out new ischemic heart disease.  We will start with switching his Ticagrelor to clopidogrel and see if his symptoms improve.  If they do not want to proceed with an ischemic workup with a stress test.  2.  Hypertension.  Elevated in the office today.  They've been well-controlled in the past.  I suspect this is due to his recent weight gain.  3.  Dyspnea.  As above this could be due to his Ticagrelor.  Cut also be due to his recent weight gain.  Also should consider ischemic heart disease although I think this is less likely.  Plan is per #1.  In the meanwhile I've also asked him to work on increasing his activity and work on weight loss.  4.  Hyperlipidemia.  On atorvastatin which is managed by Dr. Ballesteros.  5.  Objective sleep apnea.    We'll plan on the the patient back again in 2 months.    Coronary Artery Disease  Plan  • Lifestyle modifications discussed include adhering to a heart healthy diet,  avoidance of tobacco products, maintenance of a healthy weight, medication compliance, regular exercise and regular monitoring of cholesterol and blood pressure    Subjective - Objective  • There is a history of past MI 9/2017  • There has been a previous stent procedure using PERLITA 9/2017  • Current antiplatelet therapy includes aspirin 81 mg and clopidogrel 75 mg  • The patient qualifies for cardiac rehabilitation, and has completed a cardiac rehab program

## 2018-12-26 ENCOUNTER — OFFICE VISIT (OUTPATIENT)
Dept: CARDIOLOGY | Facility: CLINIC | Age: 68
End: 2018-12-26

## 2018-12-26 VITALS
SYSTOLIC BLOOD PRESSURE: 152 MMHG | WEIGHT: 230 LBS | BODY MASS INDEX: 32.93 KG/M2 | HEIGHT: 70 IN | DIASTOLIC BLOOD PRESSURE: 90 MMHG | HEART RATE: 56 BPM

## 2018-12-26 DIAGNOSIS — I10 ESSENTIAL HYPERTENSION: ICD-10-CM

## 2018-12-26 DIAGNOSIS — Z95.5 HISTORY OF CORONARY ARTERY STENT PLACEMENT: ICD-10-CM

## 2018-12-26 DIAGNOSIS — E78.5 HYPERLIPIDEMIA, UNSPECIFIED HYPERLIPIDEMIA TYPE: ICD-10-CM

## 2018-12-26 DIAGNOSIS — I25.10 CORONARY ARTERY DISEASE INVOLVING NATIVE CORONARY ARTERY OF NATIVE HEART WITHOUT ANGINA PECTORIS: Primary | ICD-10-CM

## 2018-12-26 DIAGNOSIS — R06.09 DYSPNEA ON EXERTION: ICD-10-CM

## 2018-12-26 PROCEDURE — 99213 OFFICE O/P EST LOW 20 MIN: CPT | Performed by: INTERNAL MEDICINE

## 2018-12-26 PROCEDURE — 93000 ELECTROCARDIOGRAM COMPLETE: CPT | Performed by: INTERNAL MEDICINE

## 2018-12-26 RX ORDER — INDOMETHACIN 50 MG/1
50 CAPSULE ORAL 2 TIMES DAILY WITH MEALS
COMMUNITY
Start: 2018-12-20

## 2018-12-26 NOTE — PROGRESS NOTES
Subjective:     Encounter Date:12/26/2018      Patient ID: Frantz Dinh is a 68 y.o. male.    Chief Complaint:  History of Present Illness    This is a 68-year-old with a history of hypertension, hyperlipidemia, coronary artery disease status post lateral myocardial infarction and drug eluting stent placement of his proximal to mid LAD and proximal first diagonal branch, who presents for follow-up.     I began following the patient when he presented on 9/28/2017 with findings of a lateral myocardial infarction.  Patient was out playing golf and while at the [address] [place] [address] began developing chest pressure since he developed diaphoresis and nausea.  EKG performed in the field showed evidence of lateral ST elevations.  In route the patient did suffer a ventricular fibrillation arrest that did respond to defibrillation.  He was brought emergently to the cardiac catheterization laboratory where he was found to have an occlusion of his first diagonal branch and an 80% stenosis of his mid LAD.  He subsequently underwent drug-eluting stent placement of both.  His initial echocardiogram showed low normal left ventricular systolic function with an ejection fraction of 48% with apical akinesis, and no significant valvular disease.       In follow up he had a repeat echocardiogram performed in 11/2017 that showed normalization of his left ventricular systolic function and wall motion with an estimated ejection fraction of 64%, and no significant valvular disease.  Since then during his last couple of follow ups he has complained of dyspnea on exertion that on his recent follow up he felt like was worsening.  He also admitted to weight gain over the last year.  During his last office visit I decided to try switching his ticagrelor to clopiodgrel to see if this would help his symptoms.  I also recommended that he work on weight loss.     Today he presents for a two-month follow-up.  He denies any worsening shortness of breath.  He  reports that his symptoms have been pretty stable and may be slightly bit better.  His blood pressures elevated in the office today as they were then during his last visit but he attributes the elevated blood pressures to increased stress at home this past week.  He said that he had his blood pressures checked at Dr. Ballesteros's office about a week ago at which time his blood pressures were normal.  He is planning on monitoring his blood pressures at home.  He denies any palpitations, PND orthopnea, near-syncope or syncope, or lower extremity edema.  He reports occasional brief episodes of chest pain and only last a few seconds and it's unchanged from prior.  He has not been able to increase his activity because of a flareup of gout in his right foot.      Review of Systems   Constitution: Negative for weakness and malaise/fatigue.   HENT: Negative for hearing loss, hoarse voice, nosebleeds and sore throat.    Eyes: Negative for pain.   Cardiovascular: Positive for dyspnea on exertion. Negative for chest pain, claudication, cyanosis, irregular heartbeat, leg swelling, near-syncope, orthopnea, palpitations, paroxysmal nocturnal dyspnea and syncope.   Respiratory: Negative for shortness of breath and snoring.    Endocrine: Negative for cold intolerance, heat intolerance, polydipsia, polyphagia and polyuria.   Skin: Negative for itching and rash.   Musculoskeletal: Positive for gout and joint swelling. Negative for arthritis, falls, joint pain, muscle cramps, muscle weakness and myalgias.   Gastrointestinal: Negative for constipation, diarrhea, dysphagia, heartburn, hematemesis, hematochezia, melena, nausea and vomiting.   Genitourinary: Negative for frequency, hematuria and hesitancy.   Neurological: Negative for excessive daytime sleepiness, dizziness, headaches, light-headedness and numbness.   Psychiatric/Behavioral: Negative for depression. The patient is not nervous/anxious.           Current Outpatient  Medications:   •  aspirin 81 MG EC tablet, Take 81 mg by mouth Daily., Disp: , Rfl:   •  atorvastatin (LIPITOR) 40 MG tablet, Take 1 tablet by mouth Daily., Disp: 90 tablet, Rfl: 0  •  clopidogrel (PLAVIX) 75 MG tablet, Take 1 tablet by mouth Daily., Disp: 30 tablet, Rfl: 11  •  ferrous sulfate 325 (65 FE) MG tablet, Take 325 mg by mouth Daily With Breakfast., Disp: , Rfl:   •  indomethacin (INDOCIN) 50 MG capsule, Take 50 mg by mouth 3 (Three) Times a Day., Disp: , Rfl:   •  lisinopril (PRINIVIL,ZESTRIL) 5 MG tablet, TAKE ONE TABLET BY MOUTH ONCE DAILY, Disp: 90 tablet, Rfl: 1  •  Loratadine (CLARITIN) 10 MG capsule, Take  by mouth., Disp: , Rfl:   •  metoprolol tartrate (LOPRESSOR) 25 MG tablet, TAKE ONE-HALF TABLET BY MOUTH TWICE DAILY, Disp: 90 tablet, Rfl: 0  •  pantoprazole (PROTONIX) 40 MG EC tablet, TAKE ONE TABLET BY MOUTH ONCE DAILY, Disp: 90 tablet, Rfl: 1    Past Medical History:   Diagnosis Date   • Acute myocardial infarction (CMS/HCC)     lateral wall   • Bradycardia    • Chest discomfort    • Coronary artery disease    • Hypertension    • Ventricular fibrillation (CMS/HCC)      Past Surgical History:   Procedure Laterality Date   • APPENDECTOMY     • CARDIAC CATHETERIZATION N/A 9/28/2017    Procedure: Left Heart Cath;  Surgeon: Brianna Avila MD;  Location: [facility] INVASIVE LOCATION;  Service:    • CARDIAC CATHETERIZATION N/A 9/28/2017    Procedure: Coronary angiography;  Surgeon: Brianna Avila MD;  Location: [facility] CATH INVASIVE LOCATION;  Service:    • CARDIAC CATHETERIZATION N/A 9/28/2017    Procedure: Stent PERLITA coronary;  Surgeon: Brianna Avila MD;  Location: [facility] CATH INVASIVE LOCATION;  Service:    • CARDIAC CATHETERIZATION N/A 9/28/2017    Procedure: Left ventriculography;  Surgeon: Brianna Avila MD;  Location: [facility] CATH INVASIVE LOCATION;  Service:    • CORONARY ANGIOPLASTY     • CORONARY STENT PLACEMENT     • LA RT/LT HEART CATHETERS N/A 9/28/2017    Procedure: Percutaneous  "Coronary Intervention;  Surgeon: Brianna Avila MD;  Location: [facility] INVASIVE LOCATION;  Service: Cardiovascular     Family History   Problem Relation Age of Onset   • Heart disease Mother    • Heart attack Mother    • Heart attack Father    • Heart disease Father    • Parkinsonism Father    • No Known Problems Sister    • No Known Problems Maternal Grandmother    • No Known Problems Maternal Grandfather    • No Known Problems Paternal Grandmother    • No Known Problems Paternal Grandfather      Social History     Tobacco Use   • Smoking status: Former Smoker     Packs/day: 1.50     Types: Cigarettes   • Smokeless tobacco: Never Used   • Tobacco comment: \"over 5 years ago\"    Substance Use Topics   • Alcohol use: Yes     Alcohol/week: 1.2 oz     Types: 1 Cans of beer, 1 Shots of liquor per week     Comment: weekly   • Drug use: No           ECG 12 Lead  Date/Time: 12/26/2018 12:57 PM  Performed by: Brianna Avila MD  Authorized by: Brianna Avila MD   Comparison: compared with previous ECG   Similar to previous ECG  Rhythm: sinus rhythm               Objective:         Visit Vitals  /90   Pulse 56   Ht 177.8 cm (70\")   Wt 104 kg (230 lb)   BMI 33.00 kg/m²          Physical Exam   Constitutional: He is oriented to person, place, and time. He appears well-developed and well-nourished.   HENT:   Head: Normocephalic and atraumatic.   Neck: No JVD present. Carotid bruit is not present.   Cardiovascular: Normal rate, regular rhythm, S1 normal and S2 normal. Exam reveals no gallop.   No murmur heard.  Pulses:       Radial pulses are 2+ on the right side, and 2+ on the left side.   No bilateral lower extremity edema   Pulmonary/Chest: Effort normal and breath sounds normal.   Abdominal: Soft. Normal appearance.   Neurological: He is alert and oriented to person, place, and time.   Skin: Skin is warm, dry and intact.   Psychiatric: He has a normal mood and affect.       Lab Review:       Assessment:          " Diagnosis Plan   1. Coronary artery disease involving native coronary artery of native heart without angina pectoris     2. History of coronary artery stent placement     3. Essential hypertension     4. Hyperlipidemia, unspecified hyperlipidemia type     5. Dyspnea on exertion            Plan:       1.  Dyspnea on exertion.  Symptoms appeared fairly stable and may be a little bit better.  With the relative stability of the symptoms I suspect that it's more related to deconditioning and his weight gain rather than regression and coronary artery disease.  At this time recommend further monitoring of his symptoms.  I also encouraged him to work on increasing his activity and weight loss.  Asked him to let me know if his symptoms do not improve or if they start to worsen.  2.  Coronary artery disease.  History of prior lateral myocardial infarction and drug loading stent placements proximal to mid LAD and proximal first diagonal branch.  This dyspnea symptoms do not improve may need to consider repeat ischemic workup with a stress test.  3.  Hypertension.  Elevated in the office today.  May be related to his recent weight gain but the patient attributes some of this to increased stress.  He reports that his pressures have been normal with a friend checked outside the office.  I have asked him to continue to monitor his pressures.  We'll continue his current medications.  4.  Hyperlipidemia.  On atorvastatin which is followed by Dr. Ballesteros.  5.  Obstructive sleep apnea    Plan on seeing the patient back again in 3 months.    Coronary Artery Disease  Plan  • Lifestyle modifications discussed include adhering to a heart healthy diet, avoidance of tobacco products, maintenance of a healthy weight, medication compliance, regular exercise and regular monitoring of cholesterol and blood pressure    Subjective - Objective  • There is a history of past MI 9/2017  • There has been a previous stent procedure using PERLITA 9/2017  •  Current antiplatelet therapy includes aspirin 81 mg and clopidogrel 75 mg  • The patient qualifies for cardiac rehabilitation, and has completed a cardiac rehab program

## 2019-01-01 RX ORDER — ATORVASTATIN CALCIUM 40 MG/1
TABLET, FILM COATED ORAL
Qty: 90 TABLET | Refills: 0 | Status: CANCELLED | OUTPATIENT
Start: 2019-01-01

## 2019-01-03 RX ORDER — ATORVASTATIN CALCIUM 40 MG/1
40 TABLET, FILM COATED ORAL DAILY
Qty: 90 TABLET | Refills: 0 | Status: SHIPPED | OUTPATIENT
Start: 2019-01-03 | End: 2019-04-02 | Stop reason: SDUPTHER

## 2019-02-26 ENCOUNTER — TELEPHONE (OUTPATIENT)
Dept: CARDIOLOGY | Facility: CLINIC | Age: 69
End: 2019-02-26

## 2019-02-26 NOTE — TELEPHONE ENCOUNTER
Pt called stating he was having some chest pressure Pain rate of 6 or 7 Blood pressure up 171/90 158/94. Pt states he took a couple of Aspirin and feels better. Pt requesting to know if he should be seen sooner then April. Please advise. Thanks, Lefty

## 2019-02-26 NOTE — TELEPHONE ENCOUNTER
I would try to get him in for follow up in the next week.  If his symptoms recur prior to that he needs to call us or go to the emergency room.

## 2019-03-05 ENCOUNTER — OFFICE VISIT (OUTPATIENT)
Dept: CARDIOLOGY | Facility: CLINIC | Age: 69
End: 2019-03-05

## 2019-03-05 VITALS
DIASTOLIC BLOOD PRESSURE: 74 MMHG | WEIGHT: 232 LBS | SYSTOLIC BLOOD PRESSURE: 136 MMHG | HEART RATE: 57 BPM | BODY MASS INDEX: 33.21 KG/M2 | HEIGHT: 70 IN

## 2019-03-05 DIAGNOSIS — I10 ESSENTIAL HYPERTENSION: ICD-10-CM

## 2019-03-05 DIAGNOSIS — R07.2 PRECORDIAL PAIN: Primary | ICD-10-CM

## 2019-03-05 DIAGNOSIS — I25.10 CORONARY ARTERY DISEASE INVOLVING NATIVE CORONARY ARTERY OF NATIVE HEART WITHOUT ANGINA PECTORIS: ICD-10-CM

## 2019-03-05 DIAGNOSIS — E78.5 HYPERLIPIDEMIA, UNSPECIFIED HYPERLIPIDEMIA TYPE: ICD-10-CM

## 2019-03-05 DIAGNOSIS — Z95.5 HISTORY OF CORONARY ARTERY STENT PLACEMENT: ICD-10-CM

## 2019-03-05 PROCEDURE — 99214 OFFICE O/P EST MOD 30 MIN: CPT | Performed by: INTERNAL MEDICINE

## 2019-03-05 PROCEDURE — 93000 ELECTROCARDIOGRAM COMPLETE: CPT | Performed by: INTERNAL MEDICINE

## 2019-03-05 NOTE — PROGRESS NOTES
Subjective:     Encounter Date:03/05/2019      Patient ID: Frantz Dinh is a 68 y.o. male.    Chief Complaint:  History of Present Illness    This is a 68-year-old with a history of hypertension, hyperlipidemia, coronary artery disease status post lateral myocardial infarction and drug eluting stent placement of his proximal to mid LAD and proximal first diagonal branch, who presents for follow-up.     I began following the patient when he presented on 9/28/2017 with findings of a lateral myocardial infarction.  Patient was out playing golf and while at the [address] [place] [address] began developing chest pressure since he developed diaphoresis and nausea.  EKG performed in the field showed evidence of lateral ST elevations.  In route the patient did suffer a ventricular fibrillation arrest that did respond to defibrillation.  He was brought emergently to the cardiac catheterization laboratory where he was found to have an occlusion of his first diagonal branch and an 80% stenosis of his mid LAD.  He subsequently underwent drug-eluting stent placement of both.  His initial echocardiogram showed low normal left ventricular systolic function with an ejection fraction of 48% with apical akinesis, and no significant valvular disease.       In follow up he had a repeat echocardiogram performed in 11/2017 that showed normalization of his left ventricular systolic function and wall motion with an estimated ejection fraction of 64%, and no significant valvular disease.  Since then during his last couple of follow ups he has complained of dyspnea on exertion that on his recent follow up he felt like was worsening.  He also admitted to weight gain over the last year.  During his last office visit I decided to try switching his ticagrelor to clopiodgrel to see if this would help his symptoms.  I also recommended that he work on weight loss.  When he returned for his last follow up in 12/2018 he denied any further worsening of his  shortness of breath.  His blood pressures were elevated but he admitted to increased stress so we decided to monitor his symptoms.      Today he presents for urgent follow-up.  He called last week reporting an episode of chest heaviness.  He tells me today that he was replacing his old washer and dryer and doing a lot of the moving himself.  He reports that when he sat down after finishing his work he noticed severe chest heaviness across his chest.  Reports that it felt similar and may be even a little worse than what he had at the time of his MI.  He reports some associated shortness of breath but attributed that to the exertion involved with the work he had just performed.  He took his blood pressure around that time was noted that it was elevated.  Since then he is noted his systolic blood pressures have been in the 150s and that his blood pressures today are the best it has looked in a while.  Separately he also reports milder sharp pain under his right clavicle that occurs twice almost every evening for the last 6 weeks.  The symptoms occur randomly and not associated with exertion.  He feels like his dyspnea on exertion has been getting worse lately.  Denies any palpitations, PND or orthopnea, near-syncope or syncope, or lower extremity edema.    Review of Systems   Constitution: Positive for malaise/fatigue and weight gain. Negative for weakness.   HENT: Negative for hearing loss, hoarse voice, nosebleeds and sore throat.    Eyes: Negative for pain.   Cardiovascular: Positive for chest pain and dyspnea on exertion. Negative for claudication, cyanosis, irregular heartbeat, leg swelling, near-syncope, orthopnea, palpitations, paroxysmal nocturnal dyspnea and syncope.   Respiratory: Positive for snoring and wheezing. Negative for shortness of breath.    Endocrine: Negative for cold intolerance, heat intolerance, polydipsia, polyphagia and polyuria.   Skin: Negative for itching and rash.   Musculoskeletal:  Negative for arthritis, falls, joint pain, joint swelling, muscle cramps, muscle weakness and myalgias.   Gastrointestinal: Negative for constipation, diarrhea, dysphagia, heartburn, hematemesis, hematochezia, melena, nausea and vomiting.   Genitourinary: Negative for frequency, hematuria and hesitancy.   Neurological: Positive for loss of balance. Negative for excessive daytime sleepiness, dizziness, headaches, light-headedness and numbness.   Psychiatric/Behavioral: Negative for depression. The patient is not nervous/anxious.           Current Outpatient Medications:   •  aspirin 81 MG EC tablet, Take 81 mg by mouth Daily., Disp: , Rfl:   •  atorvastatin (LIPITOR) 40 MG tablet, Take 1 tablet by mouth Daily., Disp: 90 tablet, Rfl: 0  •  clopidogrel (PLAVIX) 75 MG tablet, Take 1 tablet by mouth Daily., Disp: 30 tablet, Rfl: 11  •  ferrous sulfate 325 (65 FE) MG tablet, Take 325 mg by mouth Daily With Breakfast., Disp: , Rfl:   •  indomethacin (INDOCIN) 50 MG capsule, Take 50 mg by mouth 3 (Three) Times a Day., Disp: , Rfl:   •  lisinopril (PRINIVIL,ZESTRIL) 5 MG tablet, TAKE ONE TABLET BY MOUTH ONCE DAILY, Disp: 90 tablet, Rfl: 1  •  Loratadine (CLARITIN) 10 MG capsule, Take  by mouth., Disp: , Rfl:   •  metoprolol tartrate (LOPRESSOR) 25 MG tablet, TAKE 1/2 (ONE-HALF) TABLET BY MOUTH TWICE DAILY, Disp: 90 tablet, Rfl: 1  •  pantoprazole (PROTONIX) 40 MG EC tablet, TAKE ONE TABLET BY MOUTH ONCE DAILY, Disp: 90 tablet, Rfl: 1    Past Medical History:   Diagnosis Date   • Acute myocardial infarction (CMS/HCC)     lateral wall   • Bradycardia    • Chest discomfort    • Coronary artery disease    • Hypertension    • Ventricular fibrillation (CMS/HCC)      Past Surgical History:   Procedure Laterality Date   • APPENDECTOMY     • CARDIAC CATHETERIZATION N/A 9/28/2017    Procedure: Left Heart Cath;  Surgeon: Brianna Avila MD;  Location: [facility] INVASIVE LOCATION;  Service:    • CARDIAC CATHETERIZATION N/A 9/28/2017  "   Procedure: Coronary angiography;  Surgeon: Brianna Avila MD;  Location:  [facility] CATH INVASIVE LOCATION;  Service:    • CARDIAC CATHETERIZATION N/A 9/28/2017    Procedure: Stent PERLITA coronary;  Surgeon: Brianna Avila MD;  Location:  [facility]ATH INVASIVE LOCATION;  Service:    • CARDIAC CATHETERIZATION N/A 9/28/2017    Procedure: Left ventriculography;  Surgeon: Brianna Avila MD;  Location:  [facility] CATH INVASIVE LOCATION;  Service:    • CORONARY ANGIOPLASTY     • CORONARY STENT PLACEMENT     • KS RT/LT HEART CATHETERS N/A 9/28/2017    Procedure: Percutaneous Coronary Intervention;  Surgeon: Brianna Avila MD;  Location:  [facility] CATH INVASIVE LOCATION;  Service: Cardiovascular     Family History   Problem Relation Age of Onset   • Heart disease Mother    • Heart attack Mother    • Heart attack Father    • Heart disease Father    • Parkinsonism Father    • No Known Problems Sister    • No Known Problems Maternal Grandmother    • No Known Problems Maternal Grandfather    • No Known Problems Paternal Grandmother    • No Known Problems Paternal Grandfather      Social History     Tobacco Use   • Smoking status: Former Smoker     Packs/day: 1.50     Types: Cigarettes   • Smokeless tobacco: Never Used   • Tobacco comment: \"over 5 years ago\"    Substance Use Topics   • Alcohol use: Yes     Alcohol/week: 1.2 oz     Types: 1 Cans of beer, 1 Shots of liquor per week     Comment: weekly   • Drug use: No           ECG 12 Lead  Date/Time: 3/5/2019 2:00 PM  Performed by: Brianna Avila MD  Authorized by: Brianna Avila MD   Comparison: compared with previous ECG   Similar to previous ECG  Rhythm: sinus rhythm               Objective:         Visit Vitals  /74 (BP Location: Right arm, Patient Position: Sitting, Cuff Size: Adult)   Pulse 57   Ht 177.8 cm (70\")   Wt 105 kg (232 lb)   BMI 33.29 kg/m²          Physical Exam   Constitutional: He is oriented to person, place, and time. He appears well-developed and " well-nourished.   HENT:   Head: Normocephalic and atraumatic.   Neck: No JVD present. Carotid bruit is not present.   Cardiovascular: Normal rate, regular rhythm, S1 normal and S2 normal. Exam reveals no gallop.   No murmur heard.  Pulses:       Radial pulses are 2+ on the right side, and 2+ on the left side.   No bilateral lower extremity edema   Pulmonary/Chest: Effort normal and breath sounds normal.   Abdominal: Soft. Normal appearance.   Neurological: He is alert and oriented to person, place, and time.   Skin: Skin is warm, dry and intact.   Psychiatric: He has a normal mood and affect.       Lab Review:       Assessment:          Diagnosis Plan   1. Precordial pain  Stress Test With Myocardial Perfusion One Day    Adult Transthoracic Echo Complete W/ Cont if Necessary Per Protocol   2. Coronary artery disease involving native coronary artery of native heart without angina pectoris  Stress Test With Myocardial Perfusion One Day    Adult Transthoracic Echo Complete W/ Cont if Necessary Per Protocol   3. History of coronary artery stent placement     4. Essential hypertension     5. Hyperlipidemia, unspecified hyperlipidemia type            Plan:       1.  Chest heaviness/dyspnea on exertion.  We have been monitoring the dyspnea on exertion for a while now.  I have attributed those symptoms to deconditioning, weight gain, and possibly uncontrolled blood pressures.  However in light of his recent symptoms of chest heaviness I think we should go ahead and proceed with further workup to rule out ischemia.  We will get him set up for stress test and an echocardiogram to see if there is any change in his left ventricular function.  2.  Hypertension.  Well controlled today but they have been a little bit uncontrolled lately.  I think this is related to his weight gain recently.  I think likely will have to titrate up his antihypertensive medications.  3.  Coronary artery disease.  Status post prior LAD and first  diagonal branch stent placement.  Continue current medical management while we proceed with workup as per #1.  4.  Hyperlipidemia.  On atorvastatin which is managed by Dr. Ballesteros.    We will con discuss results of the stress test and the echocardiogram in the patient.  Will determine further recommendations based on those results.    Coronary Artery Disease  Plan  • Lifestyle modifications discussed include adhering to a heart healthy diet, avoidance of tobacco products, maintenance of a healthy weight, medication compliance, regular exercise and regular monitoring of cholesterol and blood pressure    Subjective - Objective  • There is a history of past MI 9/2017  • There has been a previous stent procedure using PERLITA 9/2017  • Current antiplatelet therapy includes aspirin 81 mg and clopidogrel 75 mg  • The patient qualifies for cardiac rehabilitation, and has completed a cardiac rehab program

## 2019-03-18 ENCOUNTER — HOSPITAL ENCOUNTER (OUTPATIENT)
Dept: CARDIOLOGY | Facility: HOSPITAL | Age: 69
Discharge: HOME | End: 2019-03-18

## 2019-03-18 ENCOUNTER — HOSPITAL ENCOUNTER (OUTPATIENT)
Dept: CARDIOLOGY | Facility: HOSPITAL | Age: 69
Discharge: HOME | End: 2019-03-18
Admitting: INTERNAL MEDICINE

## 2019-03-18 VITALS
HEART RATE: 66 BPM | WEIGHT: 230 LBS | BODY MASS INDEX: 32.93 KG/M2 | SYSTOLIC BLOOD PRESSURE: 110 MMHG | HEIGHT: 70 IN | DIASTOLIC BLOOD PRESSURE: 62 MMHG

## 2019-03-18 VITALS — WEIGHT: 231.48 LBS | BODY MASS INDEX: 33.14 KG/M2 | HEIGHT: 70 IN

## 2019-03-18 LAB
ASCENDING AORTA: 3.1 CM
BH CV ECHO MEAS - ACS: 1.9 CM
BH CV ECHO MEAS - AO MAX PG (FULL): 9 MMHG
BH CV ECHO MEAS - AO MAX PG: 15.5 MMHG
BH CV ECHO MEAS - AO MEAN PG (FULL): 4.3 MMHG
BH CV ECHO MEAS - AO MEAN PG: 7.5 MMHG
BH CV ECHO MEAS - AO ROOT AREA (BSA CORRECTED): 1.6
BH CV ECHO MEAS - AO ROOT AREA: 9.7 CM^2
BH CV ECHO MEAS - AO ROOT DIAM: 3.5 CM
BH CV ECHO MEAS - AO V2 MAX: 196.8 CM/SEC
BH CV ECHO MEAS - AO V2 MEAN: 125 CM/SEC
BH CV ECHO MEAS - AO V2 VTI: 40.1 CM
BH CV ECHO MEAS - AVA(I,A): 1.8 CM^2
BH CV ECHO MEAS - AVA(I,D): 1.8 CM^2
BH CV ECHO MEAS - AVA(V,A): 1.8 CM^2
BH CV ECHO MEAS - AVA(V,D): 1.8 CM^2
BH CV ECHO MEAS - BSA(HAYCOCK): 2.3 M^2
BH CV ECHO MEAS - BSA: 2.2 M^2
BH CV ECHO MEAS - BZI_BMI: 33 KILOGRAMS/M^2
BH CV ECHO MEAS - BZI_METRIC_HEIGHT: 177.8 CM
BH CV ECHO MEAS - BZI_METRIC_WEIGHT: 104.3 KG
BH CV ECHO MEAS - EDV(MOD-SP2): 94 ML
BH CV ECHO MEAS - EDV(MOD-SP4): 79 ML
BH CV ECHO MEAS - EDV(TEICH): 190.1 ML
BH CV ECHO MEAS - EF(CUBED): 72.5 %
BH CV ECHO MEAS - EF(MOD-BP): 66 %
BH CV ECHO MEAS - EF(MOD-SP2): 64.9 %
BH CV ECHO MEAS - EF(MOD-SP4): 67.1 %
BH CV ECHO MEAS - EF(TEICH): 63.3 %
BH CV ECHO MEAS - ESV(MOD-SP2): 33 ML
BH CV ECHO MEAS - ESV(MOD-SP4): 26 ML
BH CV ECHO MEAS - ESV(TEICH): 69.8 ML
BH CV ECHO MEAS - FS: 35 %
BH CV ECHO MEAS - IVS/LVPW: 0.93
BH CV ECHO MEAS - IVSD: 0.96 CM
BH CV ECHO MEAS - LAT PEAK E' VEL: 9 CM/SEC
BH CV ECHO MEAS - LV DIASTOLIC VOL/BSA (35-75): 35.7 ML/M^2
BH CV ECHO MEAS - LV MASS(C)D: 256.7 GRAMS
BH CV ECHO MEAS - LV MASS(C)DI: 115.9 GRAMS/M^2
BH CV ECHO MEAS - LV MAX PG: 6.5 MMHG
BH CV ECHO MEAS - LV MEAN PG: 3.2 MMHG
BH CV ECHO MEAS - LV SYSTOLIC VOL/BSA (12-30): 11.7 ML/M^2
BH CV ECHO MEAS - LV V1 MAX: 127.6 CM/SEC
BH CV ECHO MEAS - LV V1 MEAN: 80 CM/SEC
BH CV ECHO MEAS - LV V1 VTI: 26.2 CM
BH CV ECHO MEAS - LVIDD: 6.1 CM
BH CV ECHO MEAS - LVIDS: 4 CM
BH CV ECHO MEAS - LVLD AP2: 8.1 CM
BH CV ECHO MEAS - LVLD AP4: 7.7 CM
BH CV ECHO MEAS - LVLS AP2: 6.4 CM
BH CV ECHO MEAS - LVLS AP4: 6.1 CM
BH CV ECHO MEAS - LVOT AREA (M): 2.8 CM^2
BH CV ECHO MEAS - LVOT AREA: 2.8 CM^2
BH CV ECHO MEAS - LVOT DIAM: 1.9 CM
BH CV ECHO MEAS - LVPWD: 1 CM
BH CV ECHO MEAS - MED PEAK E' VEL: 8 CM/SEC
BH CV ECHO MEAS - MV A DUR: 0.15 SEC
BH CV ECHO MEAS - MV A MAX VEL: 81.5 CM/SEC
BH CV ECHO MEAS - MV DEC SLOPE: 449.4 CM/SEC^2
BH CV ECHO MEAS - MV DEC TIME: 0.21 SEC
BH CV ECHO MEAS - MV E MAX VEL: 96.5 CM/SEC
BH CV ECHO MEAS - MV E/A: 1.2
BH CV ECHO MEAS - MV MAX PG: 4.3 MMHG
BH CV ECHO MEAS - MV MEAN PG: 2 MMHG
BH CV ECHO MEAS - MV P1/2T MAX VEL: 96.5 CM/SEC
BH CV ECHO MEAS - MV P1/2T: 62.9 MSEC
BH CV ECHO MEAS - MV V2 MAX: 103.2 CM/SEC
BH CV ECHO MEAS - MV V2 MEAN: 66.1 CM/SEC
BH CV ECHO MEAS - MV V2 VTI: 40.5 CM
BH CV ECHO MEAS - MVA P1/2T LCG: 2.3 CM^2
BH CV ECHO MEAS - MVA(P1/2T): 3.5 CM^2
BH CV ECHO MEAS - MVA(VTI): 1.8 CM^2
BH CV ECHO MEAS - PA MAX PG (FULL): 5 MMHG
BH CV ECHO MEAS - PA MAX PG: 6.7 MMHG
BH CV ECHO MEAS - PA V2 MAX: 129 CM/SEC
BH CV ECHO MEAS - PULM A REVS DUR: 0.13 SEC
BH CV ECHO MEAS - PULM A REVS VEL: 28.5 CM/SEC
BH CV ECHO MEAS - PULM DIAS VEL: 63.4 CM/SEC
BH CV ECHO MEAS - PULM S/D: 1
BH CV ECHO MEAS - PULM SYS VEL: 65.6 CM/SEC
BH CV ECHO MEAS - PVA(V,A): 1.8 CM^2
BH CV ECHO MEAS - PVA(V,D): 1.8 CM^2
BH CV ECHO MEAS - QP/QS: 0.77
BH CV ECHO MEAS - RV MAX PG: 1.7 MMHG
BH CV ECHO MEAS - RV MEAN PG: 1.1 MMHG
BH CV ECHO MEAS - RV V1 MAX: 64.5 CM/SEC
BH CV ECHO MEAS - RV V1 MEAN: 48.7 CM/SEC
BH CV ECHO MEAS - RV V1 VTI: 16 CM
BH CV ECHO MEAS - RVOT AREA: 3.5 CM^2
BH CV ECHO MEAS - RVOT DIAM: 2.1 CM
BH CV ECHO MEAS - SI(AO): 175.1 ML/M^2
BH CV ECHO MEAS - SI(CUBED): 76 ML/M^2
BH CV ECHO MEAS - SI(LVOT): 32.6 ML/M^2
BH CV ECHO MEAS - SI(MOD-SP2): 27.5 ML/M^2
BH CV ECHO MEAS - SI(MOD-SP4): 23.9 ML/M^2
BH CV ECHO MEAS - SI(TEICH): 54.3 ML/M^2
BH CV ECHO MEAS - SUP REN AO DIAM: 1.7 CM
BH CV ECHO MEAS - SV(AO): 387.8 ML
BH CV ECHO MEAS - SV(CUBED): 168.4 ML
BH CV ECHO MEAS - SV(LVOT): 72.3 ML
BH CV ECHO MEAS - SV(MOD-SP2): 61 ML
BH CV ECHO MEAS - SV(MOD-SP4): 53 ML
BH CV ECHO MEAS - SV(RVOT): 55.9 ML
BH CV ECHO MEAS - SV(TEICH): 120.4 ML
BH CV ECHO MEAS - TAPSE (>1.6): 2.5 CM2
BH CV ECHO MEASUREMENTS AVERAGE E/E' RATIO: 11.35
BH CV NUCLEAR PRIOR STUDY: 2
BH CV STRESS BP STAGE 1: NORMAL
BH CV STRESS BP STAGE 2: NORMAL
BH CV STRESS BP STAGE 3: NORMAL
BH CV STRESS DURATION MIN STAGE 1: 3
BH CV STRESS DURATION MIN STAGE 2: 3
BH CV STRESS DURATION MIN STAGE 3: 1
BH CV STRESS DURATION SEC STAGE 1: 0
BH CV STRESS DURATION SEC STAGE 2: 0
BH CV STRESS DURATION SEC STAGE 3: 0
BH CV STRESS GRADE STAGE 1: 10
BH CV STRESS GRADE STAGE 2: 12
BH CV STRESS GRADE STAGE 3: 14
BH CV STRESS HR STAGE 1: 104
BH CV STRESS HR STAGE 2: 130
BH CV STRESS HR STAGE 3: 132
BH CV STRESS METS STAGE 1: 5
BH CV STRESS METS STAGE 2: 7.5
BH CV STRESS METS STAGE 3: 10
BH CV STRESS PROTOCOL 1: NORMAL
BH CV STRESS RECOVERY BP: NORMAL MMHG
BH CV STRESS RECOVERY HR: 84 BPM
BH CV STRESS SPEED STAGE 1: 1.7
BH CV STRESS SPEED STAGE 2: 2.5
BH CV STRESS SPEED STAGE 3: 3.4
BH CV STRESS STAGE 1: 1
BH CV STRESS STAGE 2: 2
BH CV STRESS STAGE 3: 3
BH CV XLRA - RV BASE: 3.3 CM
BH CV XLRA - TDI S': 15 CM/SEC
LEFT ATRIUM VOLUME INDEX: 29 ML/M2
LEFT ATRIUM VOLUME: 64 CM3
LV EF NUC BP: 61 %
MAXIMAL PREDICTED HEART RATE: 153 BPM
MAXIMAL PREDICTED HEART RATE: 153 BPM
PERCENT MAX PREDICTED HR: 86.27 %
SINUS: 3.1 CM
STJ: 3 CM
STRESS BASELINE BP: NORMAL MMHG
STRESS BASELINE HR: 73 BPM
STRESS PERCENT HR: 101 %
STRESS POST ESTIMATED WORKLOAD: 8 METS
STRESS POST EXERCISE DUR MIN: 7 MIN
STRESS POST EXERCISE DUR SEC: 0 SEC
STRESS POST PEAK BP: NORMAL MMHG
STRESS POST PEAK HR: 132 BPM
STRESS TARGET HR: 130 BPM
STRESS TARGET HR: 130 BPM

## 2019-03-18 PROCEDURE — 78452 HT MUSCLE IMAGE SPECT MULT: CPT | Performed by: INTERNAL MEDICINE

## 2019-03-18 PROCEDURE — A9502 TC99M TETROFOSMIN: HCPCS | Performed by: INTERNAL MEDICINE

## 2019-03-18 PROCEDURE — 93306 TTE W/DOPPLER COMPLETE: CPT

## 2019-03-18 PROCEDURE — 93306 TTE W/DOPPLER COMPLETE: CPT | Performed by: INTERNAL MEDICINE

## 2019-03-18 PROCEDURE — 93018 CV STRESS TEST I&R ONLY: CPT | Performed by: INTERNAL MEDICINE

## 2019-03-18 PROCEDURE — 78452 HT MUSCLE IMAGE SPECT MULT: CPT

## 2019-03-18 PROCEDURE — 93016 CV STRESS TEST SUPVJ ONLY: CPT | Performed by: INTERNAL MEDICINE

## 2019-03-18 PROCEDURE — 0 TECHNETIUM TETROFOSMIN KIT: Performed by: INTERNAL MEDICINE

## 2019-03-18 PROCEDURE — 93017 CV STRESS TEST TRACING ONLY: CPT

## 2019-03-18 RX ADMIN — TETROFOSMIN 1 DOSE: 1.38 INJECTION, POWDER, LYOPHILIZED, FOR SOLUTION INTRAVENOUS at 07:50

## 2019-03-18 RX ADMIN — TETROFOSMIN 1 DOSE: 1.38 INJECTION, POWDER, LYOPHILIZED, FOR SOLUTION INTRAVENOUS at 08:28

## 2019-03-19 NOTE — PROGRESS NOTES
Called and left a message with normal stress and echo results.  Recommended 6 month follow up.     Please see that he is scheduled for a 6 month follow up.

## 2019-04-02 RX ORDER — ATORVASTATIN CALCIUM 40 MG/1
TABLET, FILM COATED ORAL
Qty: 90 TABLET | Refills: 1 | Status: SHIPPED | OUTPATIENT
Start: 2019-04-02 | End: 2019-09-30 | Stop reason: SDUPTHER

## 2019-09-30 DIAGNOSIS — I25.10 CORONARY ARTERY DISEASE INVOLVING NATIVE CORONARY ARTERY OF NATIVE HEART WITHOUT ANGINA PECTORIS: ICD-10-CM

## 2019-09-30 RX ORDER — ATORVASTATIN CALCIUM 40 MG/1
TABLET, FILM COATED ORAL
Qty: 90 TABLET | Refills: 0 | Status: SHIPPED | OUTPATIENT
Start: 2019-09-30 | End: 2020-01-07

## 2019-09-30 RX ORDER — CLOPIDOGREL BISULFATE 75 MG/1
TABLET ORAL
Qty: 90 TABLET | Refills: 0 | Status: SHIPPED | OUTPATIENT
Start: 2019-09-30 | End: 2020-01-07

## 2019-10-08 ENCOUNTER — OFFICE VISIT (OUTPATIENT)
Dept: CARDIOLOGY | Facility: CLINIC | Age: 69
End: 2019-10-08

## 2019-10-08 VITALS
HEIGHT: 70 IN | BODY MASS INDEX: 32.64 KG/M2 | WEIGHT: 228 LBS | HEART RATE: 46 BPM | SYSTOLIC BLOOD PRESSURE: 136 MMHG | DIASTOLIC BLOOD PRESSURE: 78 MMHG

## 2019-10-08 DIAGNOSIS — Z95.5 HISTORY OF CORONARY ARTERY STENT PLACEMENT: ICD-10-CM

## 2019-10-08 DIAGNOSIS — E78.5 HYPERLIPIDEMIA, UNSPECIFIED HYPERLIPIDEMIA TYPE: ICD-10-CM

## 2019-10-08 DIAGNOSIS — I25.10 CORONARY ARTERY DISEASE INVOLVING NATIVE CORONARY ARTERY OF NATIVE HEART WITHOUT ANGINA PECTORIS: Primary | ICD-10-CM

## 2019-10-08 DIAGNOSIS — I10 ESSENTIAL HYPERTENSION: ICD-10-CM

## 2019-10-08 DIAGNOSIS — R00.1 BRADYCARDIA, SINUS: ICD-10-CM

## 2019-10-08 DIAGNOSIS — G47.33 OBSTRUCTIVE SLEEP APNEA: ICD-10-CM

## 2019-10-08 PROCEDURE — 99214 OFFICE O/P EST MOD 30 MIN: CPT | Performed by: INTERNAL MEDICINE

## 2019-10-08 PROCEDURE — 93000 ELECTROCARDIOGRAM COMPLETE: CPT | Performed by: INTERNAL MEDICINE

## 2019-10-08 NOTE — PROGRESS NOTES
Subjective:     Encounter Date:10/08/2019      Patient ID: Frantz Dinh is a 69 y.o. male.    Chief Complaint:  History of Present Illness    This is a 69-year-old with a history of hypertension, hyperlipidemia, coronary artery disease status post lateral myocardial infarction and drug eluting stent placement of his proximal to mid LAD and proximal first diagonal branch, who presents for follow-up.     I began following the patient when he presented on 9/28/2017 with findings of a lateral myocardial infarction.  Patient was out playing golf and while at the [address] [place] [address] began developing chest pressure since he developed diaphoresis and nausea.  EKG performed in the field showed evidence of lateral ST elevations.  In route the patient did suffer a ventricular fibrillation arrest that did respond to defibrillation.  He was brought emergently to the cardiac catheterization laboratory where he was found to have an occlusion of his first diagonal branch and an 80% stenosis of his mid LAD.  He subsequently underwent drug-eluting stent placement of both.  His initial echocardiogram showed low normal left ventricular systolic function with an ejection fraction of 48% with apical akinesis, and no significant valvular disease.       In follow up he had a repeat echocardiogram performed in 11/2017 that showed normalization of his left ventricular systolic function and wall motion with an estimated ejection fraction of 64%, and no significant valvular disease.  Since then during his last couple of follow ups he has complained of dyspnea on exertion that on his recent follow up he felt like was worsening.  He also admitted to weight gain over the last year.  During his last office visit I decided to try switching his ticagrelor to clopiodgrel to see if this would help his symptoms.  I also recommended that he work on weight loss.  When he returned for his last follow up in 12/2018 he denied any further worsening of his  shortness of breath.  His blood pressures were elevated but he admitted to increased stress so we decided to monitor his symptoms.      In 3/2019 he presented for urgent evaluation of chest heaviness.  He was replacing his old washer and dryer and doing a lot of the moving himself.  He reports that when he sat down after finishing his work he noticed severe chest heaviness across his chest.  He reported that it felt similar and may be even a little worse than what he had at the time of his MI.  This was associated with shortness of breath.  He took his blood pressure around that time was noted that it was elevated.  Following that office visit I set the patient up for a stress test and an echocardiogram.  Stress test was performed on 3/18/2019 and showed no evidence of ischemia.  Echocardiogram performed on the same day showed normal left ventricular systolic function and wall motion with an EF of 66% and no significant valvular disease.    He presents today for routine follow-up.  He has had no further episodes of chest heaviness.  Denies any dyspnea on exertion, palpitations, PND orthopnea, or lower extremity edema.  He reports that last month he was out golfing in the middle of the day and developed an episode of significant lightheadedness and near syncope but no syncope.  Ended up going into the clubhouse and after half an hour of cooling off and drinking water and Gatorade he started feeling more like himself.  He said no recurrent episodes.  He admits that the temperatures were elevated during this episode.  He reports compliance with all his medications.  He continues to have some generalized fatigue.      Review of Systems   Constitution: Negative for weakness and malaise/fatigue.   HENT: Negative for hearing loss, hoarse voice, nosebleeds and sore throat.    Eyes: Negative for pain.   Cardiovascular: Positive for dyspnea on exertion. Negative for chest pain, claudication, cyanosis, irregular heartbeat, leg  swelling, near-syncope, orthopnea, palpitations, paroxysmal nocturnal dyspnea and syncope.   Respiratory: Negative for shortness of breath and snoring.    Endocrine: Negative for cold intolerance, heat intolerance, polydipsia, polyphagia and polyuria.   Skin: Negative for itching and rash.   Musculoskeletal: Negative for arthritis, falls, joint pain, joint swelling, muscle cramps, muscle weakness and myalgias.   Gastrointestinal: Negative for constipation, diarrhea, dysphagia, heartburn, hematemesis, hematochezia, melena, nausea and vomiting.   Genitourinary: Negative for frequency, hematuria and hesitancy.   Neurological: Positive for light-headedness. Negative for excessive daytime sleepiness, dizziness, headaches and numbness.   Psychiatric/Behavioral: Negative for depression. The patient is not nervous/anxious.           Current Outpatient Medications:   •  aspirin 81 MG EC tablet, Take 81 mg by mouth Daily., Disp: , Rfl:   •  atorvastatin (LIPITOR) 40 MG tablet, TAKE 1 TABLET BY MOUTH ONCE DAILY, Disp: 90 tablet, Rfl: 0  •  clopidogrel (PLAVIX) 75 MG tablet, TAKE 1 TABLET BY MOUTH ONCE DAILY, Disp: 90 tablet, Rfl: 0  •  ferrous sulfate 325 (65 FE) MG tablet, Take 325 mg by mouth Daily With Breakfast., Disp: , Rfl:   •  indomethacin (INDOCIN) 50 MG capsule, Take 50 mg by mouth 3 (Three) Times a Day., Disp: , Rfl:   •  lisinopril (PRINIVIL,ZESTRIL) 5 MG tablet, TAKE ONE TABLET BY MOUTH ONCE DAILY, Disp: 90 tablet, Rfl: 1  •  Loratadine (CLARITIN) 10 MG capsule, Take  by mouth., Disp: , Rfl:   •  metoprolol tartrate (LOPRESSOR) 25 MG tablet, TAKE 1/2 (ONE-HALF) TABLET BY MOUTH TWICE DAILY, Disp: 45 tablet, Rfl: 0  •  pantoprazole (PROTONIX) 40 MG EC tablet, TAKE ONE TABLET BY MOUTH ONCE DAILY, Disp: 90 tablet, Rfl: 1    Past Medical History:   Diagnosis Date   • Acute myocardial infarction (CMS/HCC)     lateral wall   • Bradycardia    • Chest discomfort    • Coronary artery disease    • Hypertension    •  "Ventricular fibrillation (CMS/HCC)      Past Surgical History:   Procedure Laterality Date   • APPENDECTOMY     • CARDIAC CATHETERIZATION N/A 9/28/2017    Procedure: Left Heart Cath;  Surgeon: Brianna Avila MD;  Location:  [facility] INVASIVE LOCATION;  Service:    • CARDIAC CATHETERIZATION N/A 9/28/2017    Procedure: Coronary angiography;  Surgeon: Brianna Avila MD;  Location:  [facility] INVASIVE LOCATION;  Service:    • CARDIAC CATHETERIZATION N/A 9/28/2017    Procedure: Stent PERLITA coronary;  Surgeon: Brianna Avila MD;  Location:  [facility] INVASIVE LOCATION;  Service:    • CARDIAC CATHETERIZATION N/A 9/28/2017    Procedure: Left ventriculography;  Surgeon: Brianna Avila MD;  Location:  [facility] INVASIVE LOCATION;  Service:    • CORONARY ANGIOPLASTY     • CORONARY STENT PLACEMENT     • [place] RT/LT HEART CATHETERS N/A 9/28/2017    Procedure: Percutaneous Coronary Intervention;  Surgeon: Brianna Avila MD;  Location:  [facility] INVASIVE LOCATION;  Service: Cardiovascular     Family History   Problem Relation Age of Onset   • Heart disease Mother    • Heart attack Mother    • Heart attack Father    • Heart disease Father    • Parkinsonism Father    • No Known Problems Sister    • No Known Problems Maternal Grandmother    • No Known Problems Maternal Grandfather    • No Known Problems Paternal Grandmother    • No Known Problems Paternal Grandfather      Social History     Tobacco Use   • Smoking status: Former Smoker     Packs/day: 1.50     Types: Cigarettes   • Smokeless tobacco: Never Used   • Tobacco comment: \"over 5 years ago\"    Substance Use Topics   • Alcohol use: Yes     Alcohol/week: 1.2 oz     Types: 1 Cans of beer, 1 Shots of liquor per week     Comment: weekly   • Drug use: No           ECG 12 Lead  Date/Time: 10/8/2019 10:46 AM  Performed by: Brianna Avila MD  Authorized by: Brianna Avila MD   Comparison: compared with previous ECG   Comparison to previous ECG: Rate decreased  Rhythm: " "sinus bradycardia               Objective:         Visit Vitals  /78   Pulse (!) 46   Ht 177.8 cm (70\")   Wt 103 kg (228 lb)   BMI 32.71 kg/m²          Physical Exam   Constitutional: He is oriented to person, place, and time. He appears well-developed and well-nourished.   HENT:   Head: Normocephalic and atraumatic.   Neck: No JVD present. Carotid bruit is not present.   Cardiovascular: Normal rate, regular rhythm, S1 normal and S2 normal. Exam reveals no gallop.   No murmur heard.  Pulses:       Radial pulses are 2+ on the right side, and 2+ on the left side.   No bilateral lower extremity edema   Pulmonary/Chest: Effort normal and breath sounds normal.   Abdominal: Soft. Normal appearance.   Neurological: He is alert and oriented to person, place, and time.   Skin: Skin is warm, dry and intact.   Psychiatric: He has a normal mood and affect.       Lab Review:       Assessment:          Diagnosis Plan   1. Coronary artery disease involving native coronary artery of native heart without angina pectoris     2. Essential hypertension     3. Hyperlipidemia, unspecified hyperlipidemia type     4. Obstructive sleep apnea     5. History of coronary artery stent placement     6. Bradycardia, sinus            Plan:         1.  Lightheadedness/near syncope.  I suspect his episode was related to getting overheated.  He has had no recurrent episodes since then.  2.  Sinus bradycardia.  Resolved bit lower than what it is been in the past.  He is on a very low dose of metoprolol tartrate.  He appears to be asymptomatic with this.  However I think we can go ahead and stop his metoprolol at this time since this may also may be contributing some to his fatigue.    3.  Coronary artery disease.  Overall appears to be doing well from the standpoint.  Stress test in 3/2019 showed no evidence of ischemia.  Continue management with aspirin, clopidogrel, and statin.  4.  Hypertension.  Well controlled.  We will see how his blood " pressures do with the discontinuation of the metoprolol tartrate.  If they start to elevate we will need to increase his lisinopril dosage.  5.  Hyperlipidemia.  On atorvastatin.  Lipid panel from earlier this year shows that his lipids are at goal.  6.  Obstructive sleep apnea.    We will plan on seeing the patient back again in 6 months.    Coronary Artery Disease  Assessment  • The patient has no angina    Plan  • Lifestyle modifications discussed include adhering to a heart healthy diet, avoidance of tobacco products, maintenance of a healthy weight, medication compliance, regular exercise and regular monitoring of cholesterol and blood pressure    Subjective - Objective  • There is a history of past MI 9/2017  • There has been a previous stent procedure using PERLITA 9/2017  • Current antiplatelet therapy includes aspirin 81 mg and clopidogrel 75 mg  • The patient qualifies for cardiac rehabilitation, and has completed a cardiac rehab program

## 2020-01-06 DIAGNOSIS — I25.10 CORONARY ARTERY DISEASE INVOLVING NATIVE CORONARY ARTERY OF NATIVE HEART WITHOUT ANGINA PECTORIS: ICD-10-CM

## 2020-01-07 RX ORDER — ATORVASTATIN CALCIUM 40 MG/1
TABLET, FILM COATED ORAL
Qty: 90 TABLET | Refills: 0 | Status: SHIPPED | OUTPATIENT
Start: 2020-01-07 | End: 2020-04-06

## 2020-01-07 RX ORDER — CLOPIDOGREL BISULFATE 75 MG/1
TABLET ORAL
Qty: 90 TABLET | Refills: 0 | Status: SHIPPED | OUTPATIENT
Start: 2020-01-07 | End: 2020-04-06

## 2020-04-04 DIAGNOSIS — I25.10 CORONARY ARTERY DISEASE INVOLVING NATIVE CORONARY ARTERY OF NATIVE HEART WITHOUT ANGINA PECTORIS: ICD-10-CM

## 2020-04-06 RX ORDER — CLOPIDOGREL BISULFATE 75 MG/1
TABLET ORAL
Qty: 90 TABLET | Refills: 0 | Status: SHIPPED | OUTPATIENT
Start: 2020-04-06 | End: 2020-07-02

## 2020-04-06 RX ORDER — ATORVASTATIN CALCIUM 40 MG/1
TABLET, FILM COATED ORAL
Qty: 90 TABLET | Refills: 0 | Status: SHIPPED | OUTPATIENT
Start: 2020-04-06 | End: 2020-07-02

## 2020-04-20 ENCOUNTER — OFFICE VISIT (OUTPATIENT)
Dept: CARDIOLOGY | Facility: CLINIC | Age: 70
End: 2020-04-20

## 2020-04-20 VITALS
HEIGHT: 70 IN | DIASTOLIC BLOOD PRESSURE: 87 MMHG | WEIGHT: 225 LBS | SYSTOLIC BLOOD PRESSURE: 157 MMHG | HEART RATE: 60 BPM | BODY MASS INDEX: 32.21 KG/M2

## 2020-04-20 DIAGNOSIS — K21.9 GASTROESOPHAGEAL REFLUX DISEASE, ESOPHAGITIS PRESENCE NOT SPECIFIED: ICD-10-CM

## 2020-04-20 DIAGNOSIS — I25.10 CORONARY ARTERY DISEASE INVOLVING NATIVE CORONARY ARTERY OF NATIVE HEART WITHOUT ANGINA PECTORIS: Primary | ICD-10-CM

## 2020-04-20 DIAGNOSIS — E78.5 HYPERLIPIDEMIA, UNSPECIFIED HYPERLIPIDEMIA TYPE: ICD-10-CM

## 2020-04-20 DIAGNOSIS — G47.33 OBSTRUCTIVE SLEEP APNEA: ICD-10-CM

## 2020-04-20 DIAGNOSIS — I10 ESSENTIAL HYPERTENSION: ICD-10-CM

## 2020-04-20 DIAGNOSIS — Z95.5 HISTORY OF CORONARY ARTERY STENT PLACEMENT: ICD-10-CM

## 2020-04-20 PROCEDURE — 99442 PR PHYS/QHP TELEPHONE EVALUATION 11-20 MIN: CPT | Performed by: INTERNAL MEDICINE

## 2020-04-20 RX ORDER — LISINOPRIL 40 MG/1
40 TABLET ORAL DAILY
Qty: 30 TABLET | Refills: 5 | Status: SHIPPED | OUTPATIENT
Start: 2020-04-20 | End: 2020-10-05

## 2020-04-20 NOTE — PROGRESS NOTES
Subjective:     Encounter Date:04/20/2020      Patient ID: Frantz Dinh is a 69 y.o. male.    Chief Complaint:  History of Present Illness    This is a 69-year-old with a history of hypertension, hyperlipidemia, coronary artery disease status post lateral myocardial infarction and drug eluting stent placement of his proximal to mid LAD and proximal first diagonal branch, who presents for telephone follow-up.    He presents for telephone follow up in place of an office visit due to the ongoing COVID-19 pandemic.  He denies any chest pain, palpitations, lower extremity edema, near syncope or syncope.  He reports dyspnea on exertion that is largely unchanged.  He continues to play golf about 3 days a week but usually rides the cart.  He reports having more issues with mood changes lately.  His blood pressures have been a little higher after stopping the metoprolol.  His systolic mainly runs in the 150s.       Prior History:  I began following the patient when he presented on 9/28/2017 with findings of a lateral myocardial infarction.  Patient was out playing golf and while at the 16th hole began developing chest pressure since he developed diaphoresis and nausea.  EKG performed in the field showed evidence of lateral ST elevations.  In route the patient did suffer a ventricular fibrillation arrest that did respond to defibrillation.  He was brought emergently to the cardiac catheterization laboratory where he was found to have an occlusion of his first diagonal branch and an 80% stenosis of his mid LAD.  He subsequently underwent drug-eluting stent placement of both.  His initial echocardiogram showed low normal left ventricular systolic function with an ejection fraction of 48% with apical akinesis, and no significant valvular disease.       In follow up he had a repeat echocardiogram performed in 11/2017 that showed normalization of his left ventricular systolic function and wall motion with an estimated  ejection fraction of 64%, and no significant valvular disease.  Since then during his last couple of follow ups he has complained of dyspnea on exertion that on his recent follow up he felt like was worsening.  He also admitted to weight gain over the last year.  During his last office visit I decided to try switching his ticagrelor to clopiodgrel to see if this would help his symptoms.  I also recommended that he work on weight loss.  When he returned for his last follow up in 12/2018 he denied any further worsening of his shortness of breath.  His blood pressures were elevated but he admitted to increased stress so we decided to monitor his symptoms.      In 3/2019 he presented for urgent evaluation of chest heaviness.  He was replacing his old washer and dryer and doing a lot of the moving himself.  He reports that when he sat down after finishing his work he noticed severe chest heaviness across his chest.  He reported that it felt similar and may be even a little worse than what he had at the time of his MI.  This was associated with shortness of breath.  He took his blood pressure around that time was noted that it was elevated.  Following that office visit I set the patient up for a stress test and an echocardiogram.  Stress test was performed on 3/18/2019 and showed no evidence of ischemia.  Echocardiogram performed on the same day showed normal left ventricular systolic function and wall motion with an EF of 66% and no significant valvular disease.     In 4/2019 he was noted to be bradycardic on a low dose of metoprolol tartrate.  He was asymptomatic but since he was on such a low dose I opted to discontinue the medication.      Review of Systems   Constitution: Negative for malaise/fatigue.   HENT: Negative for hearing loss, hoarse voice, nosebleeds and sore throat.    Eyes: Negative for pain.   Cardiovascular: Positive for dyspnea on exertion. Negative for chest pain, claudication, cyanosis, irregular  heartbeat, leg swelling, near-syncope, orthopnea, palpitations, paroxysmal nocturnal dyspnea and syncope.   Respiratory: Negative for shortness of breath and snoring.    Endocrine: Negative for cold intolerance, heat intolerance, polydipsia, polyphagia and polyuria.   Skin: Negative for itching and rash.   Musculoskeletal: Negative for arthritis, falls, joint pain, joint swelling, muscle cramps, muscle weakness and myalgias.   Gastrointestinal: Negative for constipation, diarrhea, dysphagia, heartburn, hematemesis, hematochezia, melena, nausea and vomiting.   Genitourinary: Negative for frequency, hematuria and hesitancy.   Neurological: Negative for excessive daytime sleepiness, dizziness, headaches, light-headedness, numbness and weakness.   Psychiatric/Behavioral: Negative for depression. The patient is nervous/anxious.          Current Outpatient Medications:   •  aspirin 81 MG EC tablet, Take 81 mg by mouth Daily., Disp: , Rfl:   •  atorvastatin (LIPITOR) 40 MG tablet, Take 1 tablet by mouth once daily, Disp: 90 tablet, Rfl: 0  •  clopidogrel (PLAVIX) 75 MG tablet, Take 1 tablet by mouth once daily, Disp: 90 tablet, Rfl: 0  •  ferrous sulfate 325 (65 FE) MG tablet, Take 325 mg by mouth Daily With Breakfast., Disp: , Rfl:   •  indomethacin (INDOCIN) 50 MG capsule, Take 50 mg by mouth 3 (Three) Times a Day As Needed., Disp: , Rfl:   •  lisinopril (PRINIVIL,ZESTRIL) 5 MG tablet, TAKE ONE TABLET BY MOUTH ONCE DAILY, Disp: 90 tablet, Rfl: 1  •  Loratadine (CLARITIN) 10 MG capsule, Take  by mouth., Disp: , Rfl:   •  pantoprazole (PROTONIX) 40 MG EC tablet, TAKE ONE TABLET BY MOUTH ONCE DAILY, Disp: 90 tablet, Rfl: 1    Past Medical History:   Diagnosis Date   • Acute myocardial infarction (CMS/HCC)     lateral wall   • Bradycardia    • Chest discomfort    • Coronary artery disease    • Hyperlipidemia    • Hypertension    • CASPER (obstructive sleep apnea)    • Ventricular fibrillation (CMS/HCC)        Past Surgical  "History:   Procedure Laterality Date   • APPENDECTOMY     • CARDIAC CATHETERIZATION N/A 9/28/2017    Procedure: Left Heart Cath;  Surgeon: Brianna Avila MD;  Location:  [facility];  Service:    • CARDIAC CATHETERIZATION N/A 9/28/2017    Procedure: Coronary angiography;  Surgeon: Brianna Avila MD;  Location:  [facility];  Service:    • CARDIAC CATHETERIZATION N/A 9/28/2017    Procedure: Stent PERLITA coronary;  Surgeon: Brianna Avila MD;  Location:  [facility];  Service:    • CARDIAC CATHETERIZATION N/A 9/28/2017    Procedure: Left ventriculography;  Surgeon: Brianna Avila MD;  Location:  [facility];  Service:    • CORONARY ANGIOPLASTY     • CORONARY STENT PLACEMENT     • [place] RT/LT HEART CATHETERS N/A 9/28/2017    Procedure: Percutaneous Coronary Intervention;  Surgeon: Brianna Avila MD;  Location:  [facility];  Service: Cardiovascular       Family History   Problem Relation Age of Onset   • Heart disease Mother    • Heart attack Mother    • Heart attack Father    • Heart disease Father    • Parkinsonism Father    • No Known Problems Sister    • No Known Problems Maternal Grandmother    • No Known Problems Maternal Grandfather    • No Known Problems Paternal Grandmother    • No Known Problems Paternal Grandfather        Social History     Tobacco Use   • Smoking status: Former Smoker     Packs/day: 1.50     Types: Cigarettes   • Smokeless tobacco: Never Used   • Tobacco comment: \"over 5 years ago\"    Substance Use Topics   • Alcohol use: Yes     Alcohol/week: 2.0 standard drinks     Types: 1 Cans of beer, 1 Shots of liquor per week     Comment: weekly   • Drug use: No          Objective:     Visit Vitals (per patient's BP cuff)  /87   Pulse 60   Ht 177.8 cm (70\")   Wt 102 kg (225 lb)   BMI 32.28 kg/m²         Physical Exam  Unable to perform due to a telephone visit.       Assessment:          Diagnosis Plan   1. " Coronary artery disease involving native coronary artery of native heart without angina pectoris     2. Essential hypertension     3. Hyperlipidemia, unspecified hyperlipidemia type     4. Obstructive sleep apnea     5. Gastroesophageal reflux disease, esophagitis presence not specified     6. History of coronary artery stent placement            Plan:       1. Coronary artery disease.  Stable dyspnea on exertion which may be due to deconditioning.  Otherwise no other symptoms concerning for angina.  Continue current management.  Encouraged him to start walking more.   2. Hypertension.  Blood pressures have risen with discontinuation of metoprolol.  Recommended increasing lisinopril to 40 mg daily.    3. Hyperlipidemia.  On atorvastatin.  Has routine lab work with Dr. Ballesteros later this year.   4. Obstructive sleep apnea  5. GERD    Will follow up on BP in 1 month.     This patient has consented to a telehealth visit via telephone. The visit was scheduled as a telephone visit to comply with patient safety concerns in accordance with CDC recommendations.  All vitals recorded within this visit are reported by the patient.  I spent 15 minutes in total including but not limited to the 12 minutes spent in direct conversation with this patient.

## 2020-05-20 ENCOUNTER — CLINICAL SUPPORT (OUTPATIENT)
Dept: CARDIOLOGY | Facility: CLINIC | Age: 70
End: 2020-05-20

## 2020-05-20 VITALS — SYSTOLIC BLOOD PRESSURE: 130 MMHG | DIASTOLIC BLOOD PRESSURE: 72 MMHG

## 2020-05-20 NOTE — PROGRESS NOTES
Patient was in the office today for a B/P check only. Patient did a telephone visit with Dr. Avila on 04/20/20, and his B/P was slightly elevated. Dr. Avila wanted patient to increase his lisinopril to 40 mg daily. His B/P today was 130/72 in his left arm. I consulted with Dr. Mosley he stated that his B/P is fine and can continue his med regimen. I reviewed his medication and allergies no changes made. Patient does not have a follow-up apt with Dr. Avila.

## 2020-07-02 DIAGNOSIS — I25.10 CORONARY ARTERY DISEASE INVOLVING NATIVE CORONARY ARTERY OF NATIVE HEART WITHOUT ANGINA PECTORIS: ICD-10-CM

## 2020-07-02 RX ORDER — CLOPIDOGREL BISULFATE 75 MG/1
TABLET ORAL
Qty: 90 TABLET | Refills: 0 | Status: SHIPPED | OUTPATIENT
Start: 2020-07-02 | End: 2020-10-05

## 2020-07-02 RX ORDER — ATORVASTATIN CALCIUM 40 MG/1
TABLET, FILM COATED ORAL
Qty: 90 TABLET | Refills: 0 | Status: SHIPPED | OUTPATIENT
Start: 2020-07-02 | End: 2020-10-05

## 2020-08-18 ENCOUNTER — OFFICE VISIT (OUTPATIENT)
Dept: CARDIOLOGY | Facility: CLINIC | Age: 70
End: 2020-08-18

## 2020-08-18 VITALS
SYSTOLIC BLOOD PRESSURE: 136 MMHG | BODY MASS INDEX: 32.64 KG/M2 | WEIGHT: 228 LBS | DIASTOLIC BLOOD PRESSURE: 80 MMHG | HEIGHT: 70 IN | HEART RATE: 57 BPM

## 2020-08-18 DIAGNOSIS — Z95.5 HISTORY OF CORONARY ARTERY STENT PLACEMENT: ICD-10-CM

## 2020-08-18 DIAGNOSIS — E78.5 HYPERLIPIDEMIA, UNSPECIFIED HYPERLIPIDEMIA TYPE: ICD-10-CM

## 2020-08-18 DIAGNOSIS — I25.10 CORONARY ARTERY DISEASE INVOLVING NATIVE CORONARY ARTERY OF NATIVE HEART WITHOUT ANGINA PECTORIS: Primary | ICD-10-CM

## 2020-08-18 DIAGNOSIS — G47.33 OBSTRUCTIVE SLEEP APNEA: ICD-10-CM

## 2020-08-18 DIAGNOSIS — I10 ESSENTIAL HYPERTENSION: ICD-10-CM

## 2020-08-18 PROCEDURE — 93000 ELECTROCARDIOGRAM COMPLETE: CPT | Performed by: INTERNAL MEDICINE

## 2020-08-18 PROCEDURE — 99214 OFFICE O/P EST MOD 30 MIN: CPT | Performed by: INTERNAL MEDICINE

## 2020-08-18 NOTE — PROGRESS NOTES
Subjective:     Encounter Date:08/18/2020      Patient ID: Frantz Dinh is a 69 y.o. male.    Chief Complaint:  History of Present Illness    This is a 69-year-old with a history of hypertension, hyperlipidemia, coronary artery disease status post lateral myocardial infarction and drug eluting stent placement of his proximal to mid LAD and proximal first diagonal branch, who presents for telephone follow-up.     He presents today for routine follow-up.  He returned to the office month after his telephone visit at which time his blood pressures were well controlled on the higher dose of lisinopril.  His blood pressures remain well controlled.  He plays golf about 3-4 times a week.  Although he does not walk the course he does try to park his car to little further out so he is to do some walking.  He does have some shortness of breath with this but this is largely unchanged.  He reports brief episodes of chest discomfort last about 15 to 30 seconds and have occurred both on the right and left side of his chest.  Does not feel anything like what he has had in the past with his myocardial infarction.  Denies any palpitations, orthopnea, near-syncope or syncope, or lower extremity edema.       Prior History:  I began following the patient when he presented on 9/28/2017 with findings of a lateral myocardial infarction.  Patient was out playing golf and while at the th hole began developing chest pressure since he developed diaphoresis and nausea.  EKG performed in the field showed evidence of lateral ST elevations.  In route the patient did suffer a ventricular fibrillation arrest that did respond to defibrillation.  He was brought emergently to the cardiac catheterization laboratory where he was found to have an occlusion of his first diagonal branch and an 80% stenosis of his mid LAD.  He subsequently underwent drug-eluting stent placement of both.  His initial echocardiogram showed low normal left ventricular  systolic function with an ejection fraction of 48% with apical akinesis, and no significant valvular disease.       In follow up he had a repeat echocardiogram performed in 11/2017 that showed normalization of his left ventricular systolic function and wall motion with an estimated ejection fraction of 64%, and no significant valvular disease.  Since then during his last couple of follow ups he has complained of dyspnea on exertion that on his recent follow up he felt like was worsening.  He also admitted to weight gain over the last year.  During his last office visit I decided to try switching his ticagrelor to clopiodgrel to see if this would help his symptoms.  I also recommended that he work on weight loss.  When he returned for his last follow up in 12/2018 he denied any further worsening of his shortness of breath.  His blood pressures were elevated but he admitted to increased stress so we decided to monitor his symptoms.      In 3/2019 he presented for urgent evaluation of chest heaviness.  He was replacing his old washer and dryer and doing a lot of the moving himself.  He reports that when he sat down after finishing his work he noticed severe chest heaviness across his chest.  He reported that it felt similar and may be even a little worse than what he had at the time of his MI.  This was associated with shortness of breath.  He took his blood pressure around that time was noted that it was elevated.  Following that office visit I set the patient up for a stress test and an echocardiogram.  Stress test was performed on 3/18/2019 and showed no evidence of ischemia.  Echocardiogram performed on the same day showed normal left ventricular systolic function and wall motion with an EF of 66% and no significant valvular disease.     In 4/2019 he was noted to be bradycardic on a low dose of metoprolol tartrate.  He was asymptomatic but since he was on such a low dose I opted to discontinue the medication. At a  telephone follow up in 4/2020 he reported his blood pressures were elevated off of the metoprolol.  At that time I recommended increasing his lisinopril to 40 mg a day.      Review of Systems   Constitution: Negative for malaise/fatigue.   HENT: Negative for hearing loss, hoarse voice, nosebleeds and sore throat.    Eyes: Negative for pain.   Cardiovascular: Positive for chest pain and dyspnea on exertion. Negative for claudication, cyanosis, irregular heartbeat, leg swelling, near-syncope, orthopnea, palpitations, paroxysmal nocturnal dyspnea and syncope.   Respiratory: Negative for shortness of breath and snoring.    Endocrine: Negative for cold intolerance, heat intolerance, polydipsia, polyphagia and polyuria.   Skin: Negative for itching and rash.   Musculoskeletal: Negative for arthritis, falls, joint pain, joint swelling, muscle cramps, muscle weakness and myalgias.   Gastrointestinal: Negative for constipation, diarrhea, dysphagia, heartburn, hematemesis, hematochezia, melena, nausea and vomiting.   Genitourinary: Negative for frequency, hematuria and hesitancy.   Neurological: Negative for excessive daytime sleepiness, dizziness, headaches, light-headedness, numbness and weakness.   Psychiatric/Behavioral: Negative for depression. The patient is not nervous/anxious.          Current Outpatient Medications:   •  aspirin 81 MG EC tablet, Take 81 mg by mouth Daily., Disp: , Rfl:   •  atorvastatin (LIPITOR) 40 MG tablet, Take 1 tablet by mouth once daily, Disp: 90 tablet, Rfl: 0  •  clopidogrel (PLAVIX) 75 MG tablet, Take 1 tablet by mouth once daily, Disp: 90 tablet, Rfl: 0  •  ferrous sulfate 325 (65 FE) MG tablet, Take 325 mg by mouth Daily With Breakfast., Disp: , Rfl:   •  indomethacin (INDOCIN) 50 MG capsule, Take 50 mg by mouth 3 (Three) Times a Day As Needed., Disp: , Rfl:   •  lisinopril (PRINIVIL,ZESTRIL) 40 MG tablet, Take 1 tablet by mouth Daily., Disp: 30 tablet, Rfl: 5  •  Loratadine (CLARITIN) 10  MG capsule, Take  by mouth., Disp: , Rfl:   •  pantoprazole (PROTONIX) 40 MG EC tablet, TAKE ONE TABLET BY MOUTH ONCE DAILY, Disp: 90 tablet, Rfl: 1    Past Medical History:   Diagnosis Date   • Acute myocardial infarction (CMS/HCC)     lateral wall   • Bradycardia    • Chest discomfort    • Coronary artery disease    • Hyperlipidemia    • Hypertension    • CASPER (obstructive sleep apnea)    • Ventricular fibrillation (CMS/HCC)        Past Surgical History:   Procedure Laterality Date   • APPENDECTOMY     • CARDIAC CATHETERIZATION N/A 9/28/2017    Procedure: Left Heart Cath;  Surgeon: Brianna Avila MD;  Location:  [facility];  Service:    • CARDIAC CATHETERIZATION N/A 9/28/2017    Procedure: Coronary angiography;  Surgeon: Brianna Avila MD;  Location: [facility];  Service:    • CARDIAC CATHETERIZATION N/A 9/28/2017    Procedure: Stent PERLITA coronary;  Surgeon: Brianna Avila MD;  Location: [facility];  Service:    • CARDIAC CATHETERIZATION N/A 9/28/2017    Procedure: Left ventriculography;  Surgeon: Brianna Avila MD;  Location: [facility];  Service:    • CORONARY ANGIOPLASTY     • CORONARY STENT PLACEMENT     • [place] RT/LT HEART CATHETERS N/A 9/28/2017    Procedure: Percutaneous Coronary Intervention;  Surgeon: Brianna Avila MD;  Location:  [facility];  Service: Cardiovascular       Family History   Problem Relation Age of Onset   • Heart disease Mother    • Heart attack Mother    • Heart attack Father    • Heart disease Father    • Parkinsonism Father    • No Known Problems Sister    • No Known Problems Maternal Grandmother    • No Known Problems Maternal Grandfather    • No Known Problems Paternal Grandmother    • No Known Problems Paternal Grandfather        Social History     Tobacco Use   • Smoking status: Former Smoker     Packs/day: 1.50     Types: Cigarettes   • Smokeless tobacco: Never Used   • Tobacco comment:  "\"over 5 years ago\"    Substance Use Topics   • Alcohol use: Yes     Alcohol/week: 2.0 standard drinks     Types: 1 Cans of beer, 1 Shots of liquor per week     Comment: weekly   • Drug use: No         ECG 12 Lead  Date/Time: 8/18/2020 2:21 PM  Performed by: Brianna Avila MD  Authorized by: Brianna Avila MD   Comparison: compared with previous ECG   Similar to previous ECG  Rhythm: sinus rhythm               Objective:     Visit Vitals  /80   Pulse 57   Ht 177.8 cm (70\")   Wt 103 kg (228 lb)   BMI 32.71 kg/m²         Physical Exam   Constitutional: He is oriented to person, place, and time. He appears well-developed and well-nourished.   HENT:   Head: Normocephalic and atraumatic.   Neck: No JVD present. Carotid bruit is not present.   Cardiovascular: Normal rate, regular rhythm, S1 normal and S2 normal. Exam reveals no gallop.   No murmur heard.  Pulses:       Radial pulses are 2+ on the right side, and 2+ on the left side.   Pulmonary/Chest: Effort normal and breath sounds normal.   Abdominal: Soft. Normal appearance.   Musculoskeletal: He exhibits no edema.   Neurological: He is alert and oriented to person, place, and time.   Skin: Skin is warm, dry and intact.   Psychiatric: He has a normal mood and affect.           Assessment:          Diagnosis Plan   1. Coronary artery disease involving native coronary artery of native heart without angina pectoris     2. History of coronary artery stent placement     3. Essential hypertension     4. Hyperlipidemia, unspecified hyperlipidemia type     5. Obstructive sleep apnea            Plan:       1.  Coronary artery disease.  Stable dyspnea on exertion.  No other symptoms concerning for angina.  Continue medical management.  2.  Hypertension.  Well-controlled on lisinopril.  3.  Hyperlipidemia.  On atorvastatin for goal LDL of around 70 or below.  This is managed by Dr. Ballesteros.  His last labs in 6/2020 showed his LDL was at goal.  4.  Obstructive sleep " apnea    We will plan on seeing the patient back again in 6 months.

## 2020-10-04 DIAGNOSIS — I25.10 CORONARY ARTERY DISEASE INVOLVING NATIVE CORONARY ARTERY OF NATIVE HEART WITHOUT ANGINA PECTORIS: ICD-10-CM

## 2020-10-05 RX ORDER — LISINOPRIL 40 MG/1
TABLET ORAL
Qty: 90 TABLET | Refills: 0 | Status: SHIPPED | OUTPATIENT
Start: 2020-10-05 | End: 2021-03-04

## 2020-10-05 RX ORDER — ATORVASTATIN CALCIUM 40 MG/1
TABLET, FILM COATED ORAL
Qty: 90 TABLET | Refills: 0 | Status: SHIPPED | OUTPATIENT
Start: 2020-10-05 | End: 2020-12-30

## 2020-10-05 RX ORDER — CLOPIDOGREL BISULFATE 75 MG/1
TABLET ORAL
Qty: 90 TABLET | Refills: 0 | Status: SHIPPED | OUTPATIENT
Start: 2020-10-05 | End: 2020-12-30

## 2020-12-30 DIAGNOSIS — I25.10 CORONARY ARTERY DISEASE INVOLVING NATIVE CORONARY ARTERY OF NATIVE HEART WITHOUT ANGINA PECTORIS: ICD-10-CM

## 2020-12-30 RX ORDER — ATORVASTATIN CALCIUM 40 MG/1
TABLET, FILM COATED ORAL
Qty: 90 TABLET | Refills: 1 | Status: SHIPPED | OUTPATIENT
Start: 2020-12-30 | End: 2021-07-06

## 2020-12-30 RX ORDER — CLOPIDOGREL BISULFATE 75 MG/1
TABLET ORAL
Qty: 90 TABLET | Refills: 1 | Status: SHIPPED | OUTPATIENT
Start: 2020-12-30 | End: 2021-04-21

## 2021-02-18 ENCOUNTER — OFFICE VISIT (OUTPATIENT)
Dept: CARDIOLOGY | Facility: CLINIC | Age: 71
End: 2021-02-18

## 2021-02-18 VITALS
SYSTOLIC BLOOD PRESSURE: 160 MMHG | DIASTOLIC BLOOD PRESSURE: 102 MMHG | HEIGHT: 70 IN | HEART RATE: 73 BPM | BODY MASS INDEX: 32.93 KG/M2 | WEIGHT: 230 LBS

## 2021-02-18 DIAGNOSIS — I10 ESSENTIAL HYPERTENSION: ICD-10-CM

## 2021-02-18 DIAGNOSIS — Z95.5 HISTORY OF CORONARY ARTERY STENT PLACEMENT: ICD-10-CM

## 2021-02-18 DIAGNOSIS — R06.09 DYSPNEA ON EXERTION: ICD-10-CM

## 2021-02-18 DIAGNOSIS — E78.5 HYPERLIPIDEMIA, UNSPECIFIED HYPERLIPIDEMIA TYPE: ICD-10-CM

## 2021-02-18 DIAGNOSIS — G47.33 OBSTRUCTIVE SLEEP APNEA: ICD-10-CM

## 2021-02-18 DIAGNOSIS — I25.10 CORONARY ARTERY DISEASE INVOLVING NATIVE CORONARY ARTERY OF NATIVE HEART WITHOUT ANGINA PECTORIS: Primary | ICD-10-CM

## 2021-02-18 PROCEDURE — 99214 OFFICE O/P EST MOD 30 MIN: CPT | Performed by: INTERNAL MEDICINE

## 2021-02-18 PROCEDURE — 93000 ELECTROCARDIOGRAM COMPLETE: CPT | Performed by: INTERNAL MEDICINE

## 2021-02-18 RX ORDER — AMLODIPINE BESYLATE 5 MG/1
5 TABLET ORAL DAILY
Qty: 30 TABLET | Refills: 5 | Status: SHIPPED | OUTPATIENT
Start: 2021-02-18 | End: 2021-08-11

## 2021-02-18 NOTE — PROGRESS NOTES
Subjective:     Encounter Date:02/18/2021      Patient ID: Frantz Dinh is a 70 y.o. male.    Chief Complaint:  History of Present Illness    This is a 70-year-old with a history of hypertension, hyperlipidemia, coronary artery disease status post lateral myocardial infarction and drug eluting stent placement of his proximal to mid LAD and proximal first diagonal branch, who presents for follow-up.     He presents today for routine 6-month follow-up.  He reports over the last few months his blood pressures have been running high.  He also reports increasing dyspnea on exertion and episodes of chest discomfort.  Most of them lasted seconds but on occasion he has had a couple of episodes that have lasted minutes.  He cannot tell me if the symptoms are similar to what he experienced with his myocardial infarction.  He denies any palpitations, orthopnea, near-syncope or syncope, or lower extremity edema.  He has been unable to play golf over the last couple months because of the weather.       Prior History:  I began following the patient when he presented on 9/28/2017 with findings of a lateral myocardial infarction.  Patient was out playing golf and while at the [facility] hole began developing chest pressure since he developed diaphoresis and nausea.  EKG performed in the field showed evidence of lateral ST elevations.  In route the patient did suffer a ventricular fibrillation arrest that did respond to defibrillation.  He was brought emergently to the cardiac catheterization laboratory where he was found to have an occlusion of his first diagonal branch and an 80% stenosis of his mid LAD.  He subsequently underwent drug-eluting stent placement of both.  His initial echocardiogram showed low normal left ventricular systolic function with an ejection fraction of 48% with apical akinesis, and no significant valvular disease.       In follow up he had a repeat echocardiogram performed in 11/2017 that showed  normalization of his left ventricular systolic function and wall motion with an estimated ejection fraction of 64%, and no significant valvular disease.  Since then during his last couple of follow ups he has complained of dyspnea on exertion that on his recent follow up he felt like was worsening.  He also admitted to weight gain over the last year.  During his last office visit I decided to try switching his ticagrelor to clopiodgrel to see if this would help his symptoms.  I also recommended that he work on weight loss.  When he returned for his last follow up in 12/2018 he denied any further worsening of his shortness of breath.  His blood pressures were elevated but he admitted to increased stress so we decided to monitor his symptoms.      In 3/2019 he presented for urgent evaluation of chest heaviness.  He was replacing his old washer and dryer and doing a lot of the moving himself.  He reports that when he sat down after finishing his work he noticed severe chest heaviness across his chest.  He reported that it felt similar and may be even a little worse than what he had at the time of his MI.  This was associated with shortness of breath.  He took his blood pressure around that time was noted that it was elevated.  Following that office visit I set the patient up for a stress test and an echocardiogram.  Stress test was performed on 3/18/2019 and showed no evidence of ischemia.  Echocardiogram performed on the same day showed normal left ventricular systolic function and wall motion with an EF of 66% and no significant valvular disease.     In 4/2019 he was noted to be bradycardic on a low dose of metoprolol tartrate.  He was asymptomatic but since he was on such a low dose I opted to discontinue the medication. At a telephone follow up in 4/2020 he reported his blood pressures were elevated off of the metoprolol.  At that time I recommended increasing his lisinopril to 40 mg a day.      He had an office  visit in 8/2020 his blood pressures appear to be well controlled.  He was playing golf on a regular basis without any significant issues.  He did report brief episodes of chest discomfort lasting only seconds at a time.  No changes were made to his management at that point.      Review of Systems   Constitution: Positive for malaise/fatigue.   HENT: Negative for hearing loss, hoarse voice, nosebleeds and sore throat.    Eyes: Negative for pain.   Cardiovascular: Positive for chest pain and dyspnea on exertion. Negative for claudication, cyanosis, irregular heartbeat, leg swelling, near-syncope, orthopnea, palpitations, paroxysmal nocturnal dyspnea and syncope.   Respiratory: Positive for cough, snoring and wheezing. Negative for shortness of breath.    Endocrine: Negative for cold intolerance, heat intolerance, polydipsia, polyphagia and polyuria.   Skin: Negative for itching and rash.   Musculoskeletal: Negative for arthritis, falls, joint pain, joint swelling, muscle cramps, muscle weakness and myalgias.   Gastrointestinal: Negative for constipation, diarrhea, dysphagia, heartburn, hematemesis, hematochezia, melena, nausea and vomiting.   Genitourinary: Negative for frequency, hematuria and hesitancy.   Neurological: Positive for excessive daytime sleepiness. Negative for dizziness, headaches, light-headedness, numbness and weakness.   Psychiatric/Behavioral: Negative for depression. The patient is nervous/anxious.          Current Outpatient Medications:   •  aspirin 81 MG EC tablet, Take 81 mg by mouth Daily., Disp: , Rfl:   •  atorvastatin (LIPITOR) 40 MG tablet, Take 1 tablet by mouth once daily, Disp: 90 tablet, Rfl: 1  •  clopidogrel (PLAVIX) 75 MG tablet, Take 1 tablet by mouth once daily, Disp: 90 tablet, Rfl: 1  •  ferrous sulfate 325 (65 FE) MG tablet, Take 325 mg by mouth Daily With Breakfast., Disp: , Rfl:   •  indomethacin (INDOCIN) 50 MG capsule, Take 50 mg by mouth 2 (Two) Times a Day With Meals.,  Disp: , Rfl:   •  lisinopril (PRINIVIL,ZESTRIL) 40 MG tablet, Take 1 tablet by mouth once daily, Disp: 90 tablet, Rfl: 0  •  Loratadine (CLARITIN) 10 MG capsule, Take  by mouth., Disp: , Rfl:   •  pantoprazole (PROTONIX) 40 MG EC tablet, TAKE ONE TABLET BY MOUTH ONCE DAILY, Disp: 90 tablet, Rfl: 1    Past Medical History:   Diagnosis Date   • Acute myocardial infarction (CMS/HCC)     lateral wall   • Bradycardia    • Chest discomfort    • Coronary artery disease    • Hyperlipidemia    • Hypertension    • CASPER (obstructive sleep apnea)    • Ventricular fibrillation (CMS/HCC)        Past Surgical History:   Procedure Laterality Date   • APPENDECTOMY     • CARDIAC CATHETERIZATION N/A 9/28/2017    Procedure: Left Heart Cath;  Surgeon: Brianna Avila MD;  Location: [facility] CATH INVASIVE LOC[facility];  Service:    • CARDIAC CATHETERIZATION N/A 9/28/2017    Procedure: Coronary angiography;  Surgeon: Brianna Avila MD;  Location[facility] LOC[facility];  Service:    • CARDIAC CATHETERIZATION N/A 9/28/2017    Procedure: Stent PERLITA coronary;  Surgeon: Brianna Avila MD;  Location[facility] C[facility] LOC[facility];  Service:    • CARDIAC CATHETERIZATION N/A 9/28/2017    Procedure: Left ventriculography;  Surgeon: Brianna Avila MD;  Location[facility] LOC[facility];  Service:    • CORONARY ANGIOPLASTY     • CORONARY STENT PLACEMENT     • [place] RT/LT HEART CATHETERS N/A 9/28/2017    Procedure: Percutaneous Coronary Intervention;  Surgeon: Brianna Avila MD;  Location: [facility] CATH INVASIVE LOCATION;  Service: Cardiovascular       Family History   Problem Relation Age of Onset   • Heart disease Mother    • Heart attack Mother    • Heart attack Father    • Heart disease Father    • Parkinsonism Father    • No Known Problems Sister    • No Known Problems Maternal Grandmother    • No Known Problems Maternal Grandfather    • No Known Problems Paternal Grandmother    • No Known Problems Paternal Grandfather        Social History  "    Tobacco Use   • Smoking status: Former Smoker     Packs/day: 1.50     Types: Cigarettes   • Smokeless tobacco: Never Used   • Tobacco comment: \"over 5 years ago\"    Substance Use Topics   • Alcohol use: Yes     Alcohol/week: 2.0 standard drinks     Types: 1 Cans of beer, 1 Shots of liquor per week     Comment: weekly   • Drug use: No         ECG 12 Lead    Date/Time: 2/18/2021 9:57 AM  Performed by: Brianna Avila MD  Authorized by: Brianna Avila MD   Comparison: compared with previous ECG   Similar to previous ECG  Rhythm: sinus rhythm               Objective:     Visit Vitals  BP (!) 160/102   Pulse 73   Ht 177.8 cm (70\")   Wt 104 kg (230 lb)   BMI 33.00 kg/m²         Constitutional:       Appearance: Normal appearance. Well-developed.   HENT:      Head: Normocephalic and atraumatic.   Neck:      Vascular: No carotid bruit or JVD.   Pulmonary:      Effort: Pulmonary effort is normal.      Breath sounds: Normal breath sounds.   Cardiovascular:      Normal rate. Regular rhythm.      No gallop.   Pulses:     Radial: 2+ bilaterally.  Edema:     Peripheral edema absent.   Abdominal:      Palpations: Abdomen is soft.   Skin:     General: Skin is warm and dry.   Neurological:      Mental Status: Alert and oriented to person, place, and time.             Assessment:          Diagnosis Plan   1. Coronary artery disease involving native coronary artery of native heart without angina pectoris     2. History of coronary artery stent placement     3. Dyspnea on exertion     4. Essential hypertension     5. Hyperlipidemia, unspecified hyperlipidemia type     6. Obstructive sleep apnea            Plan:       1.  Chest pain/dyspnea on exertion.  EKG is normal.  His chest pain symptoms sound to be pretty brief but they have increased in frequency.  Symptoms may be due to poorly controlled blood pressures.  I will plan on starting with getting his blood pressures under control.  If his symptoms do not improve with better " blood pressure control will need to proceed with further cardiac work-up with a stress test and an echocardiogram.  2.  Hypertension.  Poorly controlled.  He is already on a maximum dose of lisinopril.  He developed issues with bradycardia and fatigue on metoprolol in the past.  We will go ahead and start him on amlodipine 5 mg daily.  3.  Coronary artery disease.  Plan as per #1.  Otherwise continue current medical management including aspirin, clopidogrel, and statin.  4.  Hyperlipidemia.  On atorvastatin with lipids at goal on his most recent lipid panel in 6/2020.  5.  Obstructive sleep apnea    We will plan on seeing the patient back again in 1 month.

## 2021-03-04 RX ORDER — LISINOPRIL 40 MG/1
TABLET ORAL
Qty: 90 TABLET | Refills: 0 | Status: SHIPPED | OUTPATIENT
Start: 2021-03-04 | End: 2021-06-01

## 2021-03-18 ENCOUNTER — OFFICE VISIT (OUTPATIENT)
Dept: CARDIOLOGY | Facility: CLINIC | Age: 71
End: 2021-03-18

## 2021-03-18 VITALS
HEIGHT: 70 IN | BODY MASS INDEX: 33.36 KG/M2 | WEIGHT: 233 LBS | HEART RATE: 68 BPM | SYSTOLIC BLOOD PRESSURE: 116 MMHG | DIASTOLIC BLOOD PRESSURE: 72 MMHG

## 2021-03-18 DIAGNOSIS — Z95.5 HISTORY OF CORONARY ARTERY STENT PLACEMENT: ICD-10-CM

## 2021-03-18 DIAGNOSIS — I25.10 CORONARY ARTERY DISEASE INVOLVING NATIVE CORONARY ARTERY OF NATIVE HEART WITHOUT ANGINA PECTORIS: Primary | ICD-10-CM

## 2021-03-18 DIAGNOSIS — R06.09 DYSPNEA ON EXERTION: ICD-10-CM

## 2021-03-18 DIAGNOSIS — E78.5 HYPERLIPIDEMIA, UNSPECIFIED HYPERLIPIDEMIA TYPE: ICD-10-CM

## 2021-03-18 DIAGNOSIS — I10 ESSENTIAL HYPERTENSION: ICD-10-CM

## 2021-03-18 DIAGNOSIS — G47.33 OBSTRUCTIVE SLEEP APNEA: ICD-10-CM

## 2021-03-18 PROCEDURE — 99214 OFFICE O/P EST MOD 30 MIN: CPT | Performed by: INTERNAL MEDICINE

## 2021-03-18 PROCEDURE — 93000 ELECTROCARDIOGRAM COMPLETE: CPT | Performed by: INTERNAL MEDICINE

## 2021-03-18 NOTE — PROGRESS NOTES
Subjective:     Encounter Date:03/18/2021      Patient ID: Frantz Dinh is a 70 y.o. male.    Chief Complaint:  History of Present Illness    This is a 70-year-old with a history of hypertension, hyperlipidemia, coronary artery disease status post lateral myocardial infarction and drug eluting stent placement of his proximal to mid LAD and proximal first diagonal branch, who presents for follow-up.     At his last office visit in 2/2021 he noted that his blood pressures have been running high.  Blood pressures were noted to be elevated at that office visit.  He also reported episodes of dyspnea on exertion and chest discomfort.  Most of his episodes of chest discomfort just lasted seconds but some lasted longer.  He admitted that he had been less active since he was not able to play golf because of the poor weather conditions.  At that office visit I recommended starting amlodipine 5 mg daily for his hypertension.    Today he presents for 1 month follow-up.  His blood pressures are doing much better with amlodipine.  He is tolerating the medication.  He is back to golfing.  He continues have some dyspnea on exertion although is a little bit better.  He denies any chest pain per se but reports some fluttering in the left side of his chest which lasts only seconds.  Denies any orthopnea, near-syncope or syncope, lightheadedness, or lower extremity edema.        Prior History:  I began following the patient when he presented on 9/28/2017 with findings of a lateral myocardial infarction.  Patient was out playing golf and [place] began developing chest pressure since he developed diaphoresis and nausea.  EKG performed in the field showed evidence of lateral ST elevations.  In route the patient did suffer a ventricular fibrillation arrest that did respond to defibrillation.  He was brought emergently to the cardiac catheterization laboratory where he was found to have an occlusion of his first diagonal  branch and an 80% stenosis of his mid LAD.  He subsequently underwent drug-eluting stent placement of both.  His initial echocardiogram showed low normal left ventricular systolic function with an ejection fraction of 48% with apical akinesis, and no significant valvular disease.       In follow up he had a repeat echocardiogram performed in 11/2017 that showed normalization of his left ventricular systolic function and wall motion with an estimated ejection fraction of 64%, and no significant valvular disease.  Since then during his last couple of follow ups he has complained of dyspnea on exertion that on his recent follow up he felt like was worsening.  He also admitted to weight gain over the last year.  During his last office visit I decided to try switching his ticagrelor to clopiodgrel to see if this would help his symptoms.  I also recommended that he work on weight loss.  When he returned for his last follow up in 12/2018 he denied any further worsening of his shortness of breath.  His blood pressures were elevated but he admitted to increased stress so we decided to monitor his symptoms.      In 3/2019 he presented for urgent evaluation of chest heaviness.  He was replacing his old washer and dryer and doing a lot of the moving himself.  He reports that when he sat down after finishing his work he noticed severe chest heaviness across his chest.  He reported that it felt similar and may be even a little worse than what he had at the time of his MI.  This was associated with shortness of breath.  He took his blood pressure around that time was noted that it was elevated.  Following that office visit I set the patient up for a stress test and an echocardiogram.  Stress test was performed on 3/18/2019 and showed no evidence of ischemia.  Echocardiogram performed on the same day showed normal left ventricular systolic function and wall motion with an EF of 66% and no significant valvular disease.     In 4/2019  he was noted to be bradycardic on a low dose of metoprolol tartrate.  He was asymptomatic but since he was on such a low dose I opted to discontinue the medication. At a telephone follow up in 4/2020 he reported his blood pressures were elevated off of the metoprolol.  At that time I recommended increasing his lisinopril to 40 mg a day.       He had an office visit in 8/2020 his blood pressures appear to be well controlled.  He was playing golf on a regular basis without any significant issues.  He did report brief episodes of chest discomfort lasting only seconds at a time.  No changes were made to his management at that point.      Review of Systems   Constitutional: Positive for malaise/fatigue.   HENT: Negative for hearing loss, hoarse voice, nosebleeds and sore throat.    Eyes: Negative for pain.   Cardiovascular: Positive for dyspnea on exertion and palpitations. Negative for chest pain, claudication, cyanosis, irregular heartbeat, leg swelling, near-syncope, orthopnea, paroxysmal nocturnal dyspnea and syncope.   Respiratory: Negative for shortness of breath and snoring.    Endocrine: Negative for cold intolerance, heat intolerance, polydipsia, polyphagia and polyuria.   Skin: Negative for itching and rash.   Musculoskeletal: Negative for arthritis, falls, joint pain, joint swelling, muscle cramps, muscle weakness and myalgias.   Gastrointestinal: Negative for constipation, diarrhea, dysphagia, heartburn, hematemesis, hematochezia, melena, nausea and vomiting.   Genitourinary: Negative for frequency, hematuria and hesitancy.   Neurological: Negative for excessive daytime sleepiness, dizziness, headaches, light-headedness, numbness and weakness.   Psychiatric/Behavioral: Negative for depression. The patient is not nervous/anxious.          Current Outpatient Medications:   •  amLODIPine (NORVASC) 5 MG tablet, Take 1 tablet by mouth Daily., Disp: 30 tablet, Rfl: 5  •  aspirin 81 MG EC tablet, Take 81 mg by mouth  Daily., Disp: , Rfl:   •  atorvastatin (LIPITOR) 40 MG tablet, Take 1 tablet by mouth once daily, Disp: 90 tablet, Rfl: 1  •  clopidogrel (PLAVIX) 75 MG tablet, Take 1 tablet by mouth once daily, Disp: 90 tablet, Rfl: 1  •  ferrous sulfate 325 (65 FE) MG tablet, Take 325 mg by mouth Daily With Breakfast., Disp: , Rfl:   •  indomethacin (INDOCIN) 50 MG capsule, Take 50 mg by mouth 2 (Two) Times a Day With Meals., Disp: , Rfl:   •  lisinopril (PRINIVIL,ZESTRIL) 40 MG tablet, Take 1 tablet by mouth once daily, Disp: 90 tablet, Rfl: 0  •  Loratadine (CLARITIN) 10 MG capsule, Take  by mouth., Disp: , Rfl:   •  pantoprazole (PROTONIX) 40 MG EC tablet, TAKE ONE TABLET BY MOUTH ONCE DAILY, Disp: 90 tablet, Rfl: 1    Past Medical History:   Diagnosis Date   • Acute myocardial infarction (CMS/HCC)     lateral wall   • Bradycardia    • Chest discomfort    • Coronary artery disease    • Hyperlipidemia    • Hypertension    • CASPER (obstructive sleep apnea)    • Ventricular fibrillation (CMS/HCC)        Past Surgical History:   Procedure Laterality Date   • APPENDECTOMY     • CARDIAC CATHETERIZATION N/A 9/28/2017    Procedure: Left Heart Cath;  Surgeon: Brianna Avila MD;  Location: [facility] CATH INVASIVE LOCATION;  Service:    • CARDIAC CATHETERIZATION N/A 9/28/2017    Procedure: Coronary angiography;  Surgeon: Brianna Avila MD;  Location: [facility] CATH INVASIVE LOCATION;  Service:    • CARDIAC CATHETERIZATION N/A 9/28/2017    Procedure: Stent PERLITA coronary;  Surgeon: Brianna Avila MD;  Location: [facility] CATH INVASIVE LOCATION;  Service:    • CARDIAC CATHETERIZATION N/A 9/28/2017    Procedure: Left ventriculography;  Surgeon: Brianna Avila MD;  Location: [facility] CATH INVASIVE LOCATION;  Service:    • CORONARY ANGIOPLASTY     • CORONARY STENT PLACEMENT     • [place] RT/LT HEART CATHETERS N/A 9/28/2017    Procedure: Percutaneous Coronary Intervention;  Surgeon: Brianna Avila MD;  Location: [facility] CATH INVASIVE LOCATION;  Service:  "Cardiovascular       Family History   Problem Relation Age of Onset   • Heart disease Mother    • Heart attack Mother    • Heart attack Father    • Heart disease Father    • Parkinsonism Father    • No Known Problems Sister    • No Known Problems Maternal Grandmother    • No Known Problems Maternal Grandfather    • No Known Problems Paternal Grandmother    • No Known Problems Paternal Grandfather        Social History     Tobacco Use   • Smoking status: Former Smoker     Packs/day: 1.50     Types: Cigarettes   • Smokeless tobacco: Never Used   • Tobacco comment: \"over 5 years ago\"    Substance Use Topics   • Alcohol use: Yes     Alcohol/week: 2.0 standard drinks     Types: 1 Cans of beer, 1 Shots of liquor per week     Comment: weekly   • Drug use: No         ECG 12 Lead    Date/Time: 3/18/2021 4:17 PM  Performed by: Brianna Avila MD  Authorized by: Brianna Avila MD   Comparison: compared with previous ECG   Similar to previous ECG  Rhythm: sinus rhythm               Objective:     Visit Vitals  /72 (BP Location: Right arm, Patient Position: Sitting, Cuff Size: Adult)   Pulse 68   Ht 177.8 cm (70\")   Wt 106 kg (233 lb)   BMI 33.43 kg/m²         Constitutional:       Appearance: Normal appearance. Well-developed.   HENT:      Head: Normocephalic and atraumatic.   Neck:      Vascular: No carotid bruit or JVD.   Pulmonary:      Effort: Pulmonary effort is normal.      Breath sounds: Normal breath sounds.   Cardiovascular:      Normal rate. Regular rhythm.      No gallop.   Pulses:     Radial: 2+ bilaterally.  Edema:     Peripheral edema absent.   Abdominal:      Palpations: Abdomen is soft.   Skin:     General: Skin is warm and dry.   Neurological:      Mental Status: Alert and oriented to person, place, and time.             Assessment:          Diagnosis Plan   1. Coronary artery disease involving native coronary artery of native heart without angina pectoris     2. Dyspnea on exertion     3. History of " coronary artery stent placement     4. Essential hypertension     5. Hyperlipidemia, unspecified hyperlipidemia type     6. Obstructive sleep apnea            Plan:       1.  Dyspnea on exertion.  Continues to have dyspnea on exertion although it improved a little bit.  Is unclear if this is an anginal equivalent or not.  I suspect it is more due to deconditioning.  After discussion with the patient we opted to monitor his symptoms for now and hold off on further cardiac work-up.  2.  Coronary artery disease.  Plan as per #1 in the meanwhile continue current medical management.  3.  Hypertension.  Better controlled with the addition of amlodipine.  Continue current management.  4.  Hyperlipidemia.  On atorvastatin for goal LDL of around 70 or below.  As last lipid panel in 6/2020 showed that his lipids were at goal.  5.  Obstructive sleep apnea.    We will plan on seeing the patient back in 3 months.

## 2021-03-19 ENCOUNTER — BULK ORDERING (OUTPATIENT)
Dept: CASE MANAGEMENT | Facility: OTHER | Age: 71
End: 2021-03-19

## 2021-03-19 DIAGNOSIS — Z23 IMMUNIZATION DUE: ICD-10-CM

## 2021-04-21 DIAGNOSIS — I25.10 CORONARY ARTERY DISEASE INVOLVING NATIVE CORONARY ARTERY OF NATIVE HEART WITHOUT ANGINA PECTORIS: ICD-10-CM

## 2021-04-21 RX ORDER — CLOPIDOGREL BISULFATE 75 MG/1
TABLET ORAL
Qty: 90 TABLET | Refills: 1 | Status: SHIPPED | OUTPATIENT
Start: 2021-04-21 | End: 2022-01-25

## 2021-06-01 RX ORDER — LISINOPRIL 40 MG/1
TABLET ORAL
Qty: 90 TABLET | Refills: 1 | Status: SHIPPED | OUTPATIENT
Start: 2021-06-01 | End: 2021-11-29

## 2021-06-23 ENCOUNTER — OFFICE VISIT (OUTPATIENT)
Dept: CARDIOLOGY | Facility: CLINIC | Age: 71
End: 2021-06-23

## 2021-06-23 VITALS
DIASTOLIC BLOOD PRESSURE: 70 MMHG | WEIGHT: 229 LBS | HEART RATE: 56 BPM | HEIGHT: 70 IN | BODY MASS INDEX: 32.78 KG/M2 | SYSTOLIC BLOOD PRESSURE: 120 MMHG

## 2021-06-23 DIAGNOSIS — R53.83 FATIGUE, UNSPECIFIED TYPE: ICD-10-CM

## 2021-06-23 DIAGNOSIS — I25.10 CORONARY ARTERY DISEASE INVOLVING NATIVE CORONARY ARTERY OF NATIVE HEART WITHOUT ANGINA PECTORIS: Primary | ICD-10-CM

## 2021-06-23 DIAGNOSIS — I10 ESSENTIAL HYPERTENSION: ICD-10-CM

## 2021-06-23 DIAGNOSIS — R06.09 DYSPNEA ON EXERTION: ICD-10-CM

## 2021-06-23 DIAGNOSIS — E78.5 HYPERLIPIDEMIA, UNSPECIFIED HYPERLIPIDEMIA TYPE: ICD-10-CM

## 2021-06-23 PROCEDURE — 93000 ELECTROCARDIOGRAM COMPLETE: CPT | Performed by: NURSE PRACTITIONER

## 2021-06-23 PROCEDURE — 99214 OFFICE O/P EST MOD 30 MIN: CPT | Performed by: NURSE PRACTITIONER

## 2021-06-23 NOTE — PROGRESS NOTES
Date of Office Visit: 2021  Encounter Provider: CAYLA Bacon  Place of Service: [facility] CARDIOLOGY  Patient Name: Frantz Dinh  :1950    Chief Complaint   Patient presents with   • Coronary Artery Disease     FOLLOW UP    :     HPI: Frantz Dinh is a 70 y.o. male who presents today for follow-up.  Old records have been obtained and reviewed by me.  He is a patient of Dr. Avila's with a past medical history significant for hypertension, hyperlipidemia, and coronary artery disease.  In 2017, he had a lateral STEMI and underwent drug-eluting stenting of his proximal to mid LAD and proximal first diagonal branch.  At the time, he had mildly reduced LV function with an EF of 48%.  Fortunately, he had full recovery of his LV function.   He was last seen in the office by Dr. Avila in March of this year at which time he was doing well with no complaints of angina or heart failure.  Amlodipine had recently been added to his regimen for elevated blood pressures to which he had responded well.  He did report some dyspnea on exertion which was attributed to deconditioning.  No changes were made to his medical regimen, and he was advised to follow-up in 3 months.   Overall, he has not noticed a significant change.  He has increased his activity.  He is playing golf 4 days a week and also working at [employer] on .  He is still reporting shortness of breath with exertion as well as fatigue which has persisted over the past year.  He denies any chest pain, palpitations, edema, dizziness, or syncope.  He denies any bleeding difficulties or melena.  He has some balance difficulties which he attributes to a history of concussions.    Past Medical History:   Diagnosis Date   • Acute myocardial infarction (CMS/HCC)     lateral wall   • Bradycardia    • Chest discomfort    • Coronary artery disease    • Hyperlipidemia    • Hypertension    • CASPER (obstructive  "sleep apnea)    • Ventricular fibrillation (CMS/HCC)        Past Surgical History:   Procedure Laterality Date   • APPENDECTOMY     • CARDIAC CATHETERIZATION N/A 9/28/2017    Procedure: Left Heart Cath;  Surgeon: Brianna Avila MD;  Location:  [facility] C[facility]VASIVE [facility];  Service:    • CARDIAC CATHETERIZATION N/A 9/28/2017    Procedure: Coronary angiography;  Surgeon: Brianna Avila MD;  Location:  [facility] C[facility]VAS[facility];  Service:    • CARDIAC CATHETERIZATION N/A 9/28/2017    Procedure: Stent PERLITA coronary;  Surgeon: Brianna Avila MD;  Location:  [facility];  Service:    • CARDIAC CATHETERIZATION N/A 9/28/2017    Procedure: Left ventriculography;  Surgeon: Brianna Avila MD;  Location:  [facility] C[facility]VAS[facility];  Service:    • CORONARY ANGIOPLASTY     • CORONARY STENT PLACEMENT     • HI RT/LT HEART CATHETERS N/A 9/28/2017    Procedure: Percutaneous Coronary Intervention;  Surgeon: Brianna Avila MD;  Location:  [facility] CATH INVASIVE LOCATION;  Service: Cardiovascular       Social History     Socioeconomic History   • Marital status:      Spouse name: Not on file   • Number of children: Not on file   • Years of education: Not on file   • Highest education level: Not on file   Tobacco Use   • Smoking status: Former Smoker     Packs/day: 1.50     Types: Cigarettes   • Smokeless tobacco: Never Used   • Tobacco comment: \"over 5 years ago\"    Substance and Sexual Activity   • Alcohol use: Yes     Alcohol/week: 2.0 standard drinks     Types: 1 Cans of beer, 1 Shots of liquor per week     Comment: weekly   • Drug use: No   • Sexual activity: Defer       Family History   Problem Relation Age of Onset   • Heart disease Mother    • Heart attack Mother    • Heart attack Father    • Heart disease Father    • Parkinsonism Father    • No Known Problems Sister    • No Known Problems Maternal Grandmother    • No Known Problems Maternal Grandfather    • No Known Problems Paternal Grandmother  " "  • No Known Problems Paternal Grandfather        Review of Systems   Constitutional: Positive for malaise/fatigue.   Cardiovascular: Positive for dyspnea on exertion and palpitations. Negative for chest pain, leg swelling, orthopnea, paroxysmal nocturnal dyspnea and syncope.   Respiratory: Negative.    Hematologic/Lymphatic: Negative for bleeding problem.   Musculoskeletal: Negative for falls.   Gastrointestinal: Negative for melena.   Neurological: Positive for loss of balance. Negative for dizziness and light-headedness.       No Known Allergies      Current Outpatient Medications:   •  amLODIPine (NORVASC) 5 MG tablet, Take 1 tablet by mouth Daily., Disp: 30 tablet, Rfl: 5  •  aspirin 81 MG EC tablet, Take 81 mg by mouth Daily., Disp: , Rfl:   •  atorvastatin (LIPITOR) 40 MG tablet, Take 1 tablet by mouth once daily, Disp: 90 tablet, Rfl: 1  •  clopidogrel (PLAVIX) 75 MG tablet, Take 1 tablet by mouth once daily, Disp: 90 tablet, Rfl: 1  •  ferrous sulfate 325 (65 FE) MG tablet, Take 325 mg by mouth Daily With Breakfast., Disp: , Rfl:   •  indomethacin (INDOCIN) 50 MG capsule, Take 50 mg by mouth 2 (Two) Times a Day With Meals., Disp: , Rfl:   •  lisinopril (PRINIVIL,ZESTRIL) 40 MG tablet, Take 1 tablet by mouth once daily, Disp: 90 tablet, Rfl: 1  •  Loratadine (CLARITIN) 10 MG capsule, Take  by mouth., Disp: , Rfl:   •  pantoprazole (PROTONIX) 40 MG EC tablet, TAKE ONE TABLET BY MOUTH ONCE DAILY, Disp: 90 tablet, Rfl: 1      Objective:     Vitals:    06/23/21 0856   BP: 120/70   Pulse: 56   Weight: 104 kg (229 lb)   Height: 177.8 cm (70\")     Body mass index is 32.86 kg/m².    PHYSICAL EXAM:    Neck:      Vascular: No JVD.   Pulmonary:      Effort: Pulmonary effort is normal.      Breath sounds: Normal breath sounds.   Cardiovascular:      Normal rate. Regular rhythm.      Murmurs: There is no murmur.      No gallop. No click. No rub.   Pulses:     Intact distal pulses.           ECG 12 Lead    Date/Time: " 6/23/2021 9:04 AM  Performed by: Keisha Pace APRN  Authorized by: Keisha Pace APRN   Comparison: compared with previous ECG from 3/18/2021  Similar to previous ECG  Rhythm: sinus rhythm  Rate: normal  BPM: 56  QRS axis: left  Other findings: non-specific ST-T wave changes    Clinical impression: abnormal EKG  Comments: Indication: CAD              Assessment:       Diagnosis Plan   1. Coronary artery disease involving native coronary artery of native heart without angina pectoris  ECG 12 Lead    Adult Stress Echo W/ Cont or Stress Agent if Necessary Per Protocol   2. Essential hypertension     3. Hyperlipidemia, unspecified hyperlipidemia type     4. Dyspnea on exertion  Adult Stress Echo W/ Cont or Stress Agent if Necessary Per Protocol   5. Fatigue, unspecified type  Adult Stress Echo W/ Cont or Stress Agent if Necessary Per Protocol     Orders Placed This Encounter   Procedures   • ECG 12 Lead     This order was created via procedure documentation     Order Specific Question:   Release to patient     Answer:   Immediate   • Adult Stress Echo W/ Cont or Stress Agent if Necessary Per Protocol     Standing Status:   Future     Standing Expiration Date:   6/23/2022     Order Specific Question:   What stress agent will be used?     Answer:   Exercise with [employer]     Order Specific Question:   Reason for exam?     Answer:   Dyspnea     Order Specific Question:   Release to patient     Answer:   Immediate          Plan:       1.  Coronary artery disease.  History of lateral STEMI 2017 with stenting of the proximal to mid LAD and proximal first diagonal.  He is on dual antiplatelet therapy with aspirin and Plavix as well as high intensity statin therapy.  He denies any chest pain.  However, he is still reporting dyspnea and fatigue.  He has not had an ischemic evaluation since 2019.  I will order a stress test.      2.  Hypertension.  His blood pressure is stable.  Continue current regimen  with amlodipine and lisinopril.      3.  Hyperlipidemia.  Lipid panel from April of this year revealed an LDL of 74 and an HDL of 36.  Continue atorvastatin 40 mg daily.      4.  Dyspnea/fatigue.  Certainly could be an anginal equivalent.  We will proceed with a stress echocardiogram for further evaluation.      Further recommendations will be made pending the results of the above.      As always, it has been a pleasure to participate in your patient's care.      Sincerely,         CAYLA Morris

## 2021-06-30 ENCOUNTER — HOSPITAL ENCOUNTER (OUTPATIENT)
Dept: CARDIOLOGY | Facility: HOSPITAL | Age: 71
Discharge: HOME | End: 2021-06-30
Admitting: NURSE PRACTITIONER

## 2021-06-30 ENCOUNTER — TELEPHONE (OUTPATIENT)
Dept: CARDIOLOGY | Facility: CLINIC | Age: 71
End: 2021-06-30

## 2021-06-30 VITALS
OXYGEN SATURATION: 98 % | BODY MASS INDEX: 32.78 KG/M2 | DIASTOLIC BLOOD PRESSURE: 80 MMHG | HEART RATE: 62 BPM | SYSTOLIC BLOOD PRESSURE: 140 MMHG | WEIGHT: 229 LBS | HEIGHT: 70 IN

## 2021-06-30 DIAGNOSIS — R53.83 FATIGUE, UNSPECIFIED TYPE: ICD-10-CM

## 2021-06-30 DIAGNOSIS — R06.09 DYSPNEA ON EXERTION: ICD-10-CM

## 2021-06-30 DIAGNOSIS — I25.10 CORONARY ARTERY DISEASE INVOLVING NATIVE CORONARY ARTERY OF NATIVE HEART WITHOUT ANGINA PECTORIS: ICD-10-CM

## 2021-06-30 LAB
ASCENDING AORTA: 3.3 CM
BH CV ECHO MEAS - ACS: 2 CM
BH CV ECHO MEAS - AO MAX PG: 11.7 MMHG
BH CV ECHO MEAS - AO ROOT AREA (BSA CORRECTED): 1.6
BH CV ECHO MEAS - AO ROOT AREA: 9.6 CM^2
BH CV ECHO MEAS - AO ROOT DIAM: 3.5 CM
BH CV ECHO MEAS - AO V2 MAX: 171 CM/SEC
BH CV ECHO MEAS - ASC AORTA: 3.3 CM
BH CV ECHO MEAS - BSA(HAYCOCK): 2.3 M^2
BH CV ECHO MEAS - BSA: 2.2 M^2
BH CV ECHO MEAS - BZI_BMI: 32.9 KILOGRAMS/M^2
BH CV ECHO MEAS - BZI_METRIC_HEIGHT: 177.8 CM
BH CV ECHO MEAS - BZI_METRIC_WEIGHT: 103.9 KG
BH CV ECHO MEAS - EDV(MOD-SP2): 70 ML
BH CV ECHO MEAS - EDV(MOD-SP4): 85 ML
BH CV ECHO MEAS - EDV(TEICH): 99.2 ML
BH CV ECHO MEAS - EF(CUBED): 66 %
BH CV ECHO MEAS - EF(MOD-BP): 58 %
BH CV ECHO MEAS - EF(MOD-SP2): 70 %
BH CV ECHO MEAS - EF(MOD-SP4): 76.5 %
BH CV ECHO MEAS - EF(TEICH): 57.5 %
BH CV ECHO MEAS - ESV(MOD-SP2): 21 ML
BH CV ECHO MEAS - ESV(MOD-SP4): 20 ML
BH CV ECHO MEAS - ESV(TEICH): 42.1 ML
BH CV ECHO MEAS - FS: 30.2 %
BH CV ECHO MEAS - IVS/LVPW: 1.1
BH CV ECHO MEAS - IVSD: 1.1 CM
BH CV ECHO MEAS - LAT PEAK E' VEL: 8.2 CM/SEC
BH CV ECHO MEAS - LV DIASTOLIC VOL/BSA (35-75): 38.4 ML/M^2
BH CV ECHO MEAS - LV MASS(C)D: 169 GRAMS
BH CV ECHO MEAS - LV MASS(C)DI: 76.5 GRAMS/M^2
BH CV ECHO MEAS - LV SYSTOLIC VOL/BSA (12-30): 9 ML/M^2
BH CV ECHO MEAS - LVIDD: 4.6 CM
BH CV ECHO MEAS - LVIDS: 3.2 CM
BH CV ECHO MEAS - LVLD AP2: 8.5 CM
BH CV ECHO MEAS - LVLD AP4: 8.6 CM
BH CV ECHO MEAS - LVLS AP2: 7 CM
BH CV ECHO MEAS - LVLS AP4: 7 CM
BH CV ECHO MEAS - LVOT AREA (M): 3.8 CM^2
BH CV ECHO MEAS - LVOT AREA: 3.9 CM^2
BH CV ECHO MEAS - LVOT DIAM: 2.2 CM
BH CV ECHO MEAS - LVPWD: 1 CM
BH CV ECHO MEAS - MED PEAK E' VEL: 6.7 CM/SEC
BH CV ECHO MEAS - MV A DUR: 0.14 SEC
BH CV ECHO MEAS - MV A MAX VEL: 92.4 CM/SEC
BH CV ECHO MEAS - MV DEC SLOPE: 310.3 CM/SEC^2
BH CV ECHO MEAS - MV DEC TIME: 0.24 SEC
BH CV ECHO MEAS - MV E MAX VEL: 103 CM/SEC
BH CV ECHO MEAS - MV E/A: 1.1
BH CV ECHO MEAS - MV P1/2T MAX VEL: 89.6 CM/SEC
BH CV ECHO MEAS - MV P1/2T: 84.6 MSEC
BH CV ECHO MEAS - MVA P1/2T LCG: 2.5 CM^2
BH CV ECHO MEAS - MVA(P1/2T): 2.6 CM^2
BH CV ECHO MEAS - PULM A REVS DUR: 0.1 SEC
BH CV ECHO MEAS - PULM A REVS VEL: 26.1 CM/SEC
BH CV ECHO MEAS - PULM DIAS VEL: 40.5 CM/SEC
BH CV ECHO MEAS - PULM S/D: 1.1
BH CV ECHO MEAS - PULM SYS VEL: 43.1 CM/SEC
BH CV ECHO MEAS - RAP SYSTOLE: 3 MMHG
BH CV ECHO MEAS - RVSP: 16.2 MMHG
BH CV ECHO MEAS - SI(CUBED): 29.7 ML/M^2
BH CV ECHO MEAS - SI(MOD-SP2): 22.2 ML/M^2
BH CV ECHO MEAS - SI(MOD-SP4): 29.4 ML/M^2
BH CV ECHO MEAS - SI(TEICH): 25.8 ML/M^2
BH CV ECHO MEAS - SV(CUBED): 65.8 ML
BH CV ECHO MEAS - SV(MOD-SP2): 49 ML
BH CV ECHO MEAS - SV(MOD-SP4): 65 ML
BH CV ECHO MEAS - SV(TEICH): 57 ML
BH CV ECHO MEAS - TAPSE (>1.6): 2.4 CM
BH CV ECHO MEAS - TR MAX VEL: 181.9 CM/SEC
BH CV ECHO MEASUREMENTS AVERAGE E/E' RATIO: 13.83
BH CV STRESS BP STAGE 1: NORMAL
BH CV STRESS BP STAGE 2: NORMAL
BH CV STRESS DURATION MIN STAGE 1: 3
BH CV STRESS DURATION MIN STAGE 2: 3
BH CV STRESS DURATION MIN STAGE 3: 1
BH CV STRESS DURATION SEC STAGE 1: 0
BH CV STRESS DURATION SEC STAGE 2: 0
BH CV STRESS DURATION SEC STAGE 3: 30
BH CV STRESS ECHO POST STRESS EJECTION FRACTION EF: 73 %
BH CV STRESS GRADE STAGE 1: 10
BH CV STRESS GRADE STAGE 2: 12
BH CV STRESS GRADE STAGE 3: 14
BH CV STRESS HR STAGE 1: 91
BH CV STRESS HR STAGE 2: 119
BH CV STRESS HR STAGE 3: 136
BH CV STRESS METS STAGE 1: 5
BH CV STRESS METS STAGE 2: 7.5
BH CV STRESS METS STAGE 3: 10
BH CV STRESS PROTOCOL 1: NORMAL
BH CV STRESS SPEED STAGE 1: 1.7
BH CV STRESS SPEED STAGE 2: 2.5
BH CV STRESS SPEED STAGE 3: 3.4
BH CV STRESS STAGE 1: 1
BH CV STRESS STAGE 2: 2
BH CV STRESS STAGE 3: 3
BH CV XLRA - RV BASE: 2.4 CM
BH CV XLRA - RV LENGTH: 7.9 CM
BH CV XLRA - RV MID: 2.4 CM
BH CV XLRA - TDI S': 16.6 CM/SEC
LEFT ATRIUM VOLUME INDEX: 24 ML/M2
MAXIMAL PREDICTED HEART RATE: 151 BPM
PERCENT MAX PREDICTED HR: 90.07 %
SINUS: 3.4 CM
STJ: 3.2 CM
STRESS BASELINE BP: NORMAL MMHG
STRESS BASELINE HR: 62 BPM
STRESS O2 SAT REST: 98 %
STRESS PERCENT HR: 106 %
STRESS POST ESTIMATED WORKLOAD: 9 METS
STRESS POST EXERCISE DUR MIN: 7 MIN
STRESS POST EXERCISE DUR SEC: 30 SEC
STRESS POST O2 SAT PEAK: 96 %
STRESS POST PEAK BP: NORMAL MMHG
STRESS POST PEAK HR: 136 BPM
STRESS TARGET HR: 128 BPM

## 2021-06-30 PROCEDURE — 93325 DOPPLER ECHO COLOR FLOW MAPG: CPT

## 2021-06-30 PROCEDURE — 93320 DOPPLER ECHO COMPLETE: CPT | Performed by: INTERNAL MEDICINE

## 2021-06-30 PROCEDURE — 93017 CV STRESS TEST TRACING ONLY: CPT

## 2021-06-30 PROCEDURE — 93016 CV STRESS TEST SUPVJ ONLY: CPT | Performed by: INTERNAL MEDICINE

## 2021-06-30 PROCEDURE — 25010000002 PERFLUTREN (DEFINITY) 8.476 MG IN SODIUM CHLORIDE (PF) 0.9 % 10 ML INJECTION: Performed by: NURSE PRACTITIONER

## 2021-06-30 PROCEDURE — 93325 DOPPLER ECHO COLOR FLOW MAPG: CPT | Performed by: INTERNAL MEDICINE

## 2021-06-30 PROCEDURE — 93352 ADMIN ECG CONTRAST AGENT: CPT | Performed by: INTERNAL MEDICINE

## 2021-06-30 PROCEDURE — 93350 STRESS TTE ONLY: CPT

## 2021-06-30 PROCEDURE — 93018 CV STRESS TEST I&R ONLY: CPT | Performed by: INTERNAL MEDICINE

## 2021-06-30 PROCEDURE — 93320 DOPPLER ECHO COMPLETE: CPT

## 2021-06-30 PROCEDURE — 93350 STRESS TTE ONLY: CPT | Performed by: INTERNAL MEDICINE

## 2021-06-30 RX ADMIN — PERFLUTREN 3 ML: 6.52 INJECTION, SUSPENSION INTRAVENOUS at 10:56

## 2021-06-30 NOTE — TELEPHONE ENCOUNTER
Spoke with him regarding his normal stress echocardiogram.      Please schedule a 3-month follow-up with Dr. Avila.

## 2021-07-06 RX ORDER — ATORVASTATIN CALCIUM 40 MG/1
TABLET, FILM COATED ORAL
Qty: 90 TABLET | Refills: 0 | Status: SHIPPED | OUTPATIENT
Start: 2021-07-06 | End: 2021-10-04

## 2021-08-11 RX ORDER — AMLODIPINE BESYLATE 5 MG/1
TABLET ORAL
Qty: 30 TABLET | Refills: 5 | Status: SHIPPED | OUTPATIENT
Start: 2021-08-11 | End: 2022-02-16

## 2021-10-04 RX ORDER — ATORVASTATIN CALCIUM 40 MG/1
TABLET, FILM COATED ORAL
Qty: 90 TABLET | Refills: 0 | Status: SHIPPED | OUTPATIENT
Start: 2021-10-04 | End: 2021-12-20

## 2021-10-12 ENCOUNTER — OFFICE VISIT (OUTPATIENT)
Dept: CARDIOLOGY | Facility: CLINIC | Age: 71
End: 2021-10-12

## 2021-10-12 VITALS
WEIGHT: 226 LBS | HEART RATE: 55 BPM | HEIGHT: 70 IN | SYSTOLIC BLOOD PRESSURE: 142 MMHG | OXYGEN SATURATION: 97 % | BODY MASS INDEX: 32.35 KG/M2 | DIASTOLIC BLOOD PRESSURE: 74 MMHG

## 2021-10-12 DIAGNOSIS — E78.5 HYPERLIPIDEMIA, UNSPECIFIED HYPERLIPIDEMIA TYPE: ICD-10-CM

## 2021-10-12 DIAGNOSIS — G47.33 OBSTRUCTIVE SLEEP APNEA: ICD-10-CM

## 2021-10-12 DIAGNOSIS — Z95.5 HISTORY OF CORONARY ARTERY STENT PLACEMENT: ICD-10-CM

## 2021-10-12 DIAGNOSIS — I10 PRIMARY HYPERTENSION: ICD-10-CM

## 2021-10-12 DIAGNOSIS — I25.10 CORONARY ARTERY DISEASE INVOLVING NATIVE CORONARY ARTERY OF NATIVE HEART WITHOUT ANGINA PECTORIS: Primary | ICD-10-CM

## 2021-10-12 PROBLEM — R06.09 DYSPNEA ON EXERTION: Status: RESOLVED | Noted: 2018-10-16 | Resolved: 2021-10-12

## 2021-10-12 PROCEDURE — 93000 ELECTROCARDIOGRAM COMPLETE: CPT | Performed by: INTERNAL MEDICINE

## 2021-10-12 PROCEDURE — 99214 OFFICE O/P EST MOD 30 MIN: CPT | Performed by: INTERNAL MEDICINE

## 2021-10-12 NOTE — PROGRESS NOTES
Subjective:     Encounter Date:10/12/2021      Patient ID: Frantz Dinh is a 71 y.o. male.    Chief Complaint:  History of Present Illness    This is a 71-year-old with a history of hypertension, hyperlipidemia, coronary artery disease status post lateral myocardial infarction and drug eluting stent placement of his proximal to mid LAD and proximal first diagonal branch, who presents for follow-up.     He presents today for routine follow-up.  He reports his dyspnea on exertion is stable.  He otherwise denies any chest pain, palpitations, orthopnea, near-syncope or syncope, or lower extremity edema.  Reports that his blood pressures are better controlled at home.  He continues to play golf multiple times a week without any significant issues.     Prior History:  I began following the patient when he presented on 9/28/2017 with findings of a lateral myocardial infarction.  Patient was out playing golf and while at the 16th hole began developing chest pressure since he developed diaphoresis and nausea.  EKG performed in the field showed evidence of lateral ST elevations.  In route the patient did suffer a ventricular fibrillation arrest that did respond to defibrillation.  He was brought emergently to the cardiac catheterization laboratory where he was found to have an occlusion of his first diagonal branch and an 80% stenosis of his mid LAD.  He subsequently underwent drug-eluting stent placement of both.  His initial echocardiogram showed low normal left ventricular systolic function with an ejection fraction of 48% with apical akinesis, and no significant valvular disease.       In follow up he had a repeat echocardiogram performed in 11/2017 that showed normalization of his left ventricular systolic function and wall motion with an estimated ejection fraction of 64%, and no significant valvular disease.  Since then during his last couple of follow ups he has complained of dyspnea on exertion that on his  recent follow up he felt like was worsening.  He also admitted to weight gain over the last year.  During his last office visit I decided to try switching his ticagrelor to clopiodgrel to see if this would help his symptoms.  I also recommended that he work on weight loss.  When he returned for his last follow up in 12/2018 he denied any further worsening of his shortness of breath.  His blood pressures were elevated but he admitted to increased stress so we decided to monitor his symptoms.      In 3/2019 he presented for urgent evaluation of chest heaviness.  He was replacing his old washer and dryer and doing a lot of the moving himself.  He reports that when he sat down after finishing his work he noticed severe chest heaviness across his chest.  He reported that it felt similar and may be even a little worse than what he had at the time of his MI.  This was associated with shortness of breath.  He took his blood pressure around that time was noted that it was elevated.  Following that office visit I set the patient up for a stress test and an echocardiogram.  Stress test was performed on 3/18/2019 and showed no evidence of ischemia.  Echocardiogram performed on the same day showed normal left ventricular systolic function and wall motion with an EF of 66% and no significant valvular disease.     In 4/2019 he was noted to be bradycardic on a low dose of metoprolol tartrate.  He was asymptomatic but since he was on such a low dose I opted to discontinue the medication. At a telephone follow up in 4/2020 he reported his blood pressures were elevated off of the metoprolol.  At that time I recommended increasing his lisinopril to 40 mg a day.       In 2/2020 was noted to have elevated blood pressures so amlodipine 5 mg daily was added to his regimen with improvement in his pressures.    In 6/2021 he was continuing to complain of dyspnea on exertion routine follow-up with CAYLA Morris.  A stress echocardiogram  was ordered at that time and performed on 6/30/2021.  This showed normal left ventricular systolic function wall motion with an EF of 58% and no significant valvular disease.  Stress portion showed no evidence of ischemia.    Review of Systems   Constitutional: Negative for malaise/fatigue.   HENT: Negative for hearing loss, hoarse voice, nosebleeds and sore throat.    Eyes: Negative for pain.   Cardiovascular: Positive for dyspnea on exertion. Negative for chest pain, claudication, cyanosis, irregular heartbeat, leg swelling, near-syncope, orthopnea, palpitations, paroxysmal nocturnal dyspnea and syncope.   Respiratory: Negative for shortness of breath and snoring.    Endocrine: Negative for cold intolerance, heat intolerance, polydipsia, polyphagia and polyuria.   Skin: Negative for itching and rash.   Musculoskeletal: Negative for arthritis, falls, joint pain, joint swelling, muscle cramps, muscle weakness and myalgias.   Gastrointestinal: Negative for constipation, diarrhea, dysphagia, heartburn, hematemesis, hematochezia, melena, nausea and vomiting.   Genitourinary: Negative for frequency, hematuria and hesitancy.   Neurological: Negative for excessive daytime sleepiness, dizziness, headaches, light-headedness, numbness and weakness.   Psychiatric/Behavioral: Negative for depression. The patient is not nervous/anxious.          Current Outpatient Medications:   •  amLODIPine (NORVASC) 5 MG tablet, Take 1 tablet by mouth once daily, Disp: 30 tablet, Rfl: 5  •  aspirin 81 MG EC tablet, Take 81 mg by mouth Daily., Disp: , Rfl:   •  atorvastatin (LIPITOR) 40 MG tablet, Take 1 tablet by mouth once daily, Disp: 90 tablet, Rfl: 0  •  clopidogrel (PLAVIX) 75 MG tablet, Take 1 tablet by mouth once daily, Disp: 90 tablet, Rfl: 1  •  ferrous sulfate 325 (65 FE) MG tablet, Take 325 mg by mouth Daily With Breakfast., Disp: , Rfl:   •  indomethacin (INDOCIN) 50 MG capsule, Take 50 mg by mouth 2 (Two) Times a Day With  Meals., Disp: , Rfl:   •  lisinopril (PRINIVIL,ZESTRIL) 40 MG tablet, Take 1 tablet by mouth once daily, Disp: 90 tablet, Rfl: 1  •  Loratadine (CLARITIN) 10 MG capsule, Take  by mouth., Disp: , Rfl:   •  pantoprazole (PROTONIX) 40 MG EC tablet, TAKE ONE TABLET BY MOUTH ONCE DAILY, Disp: 90 tablet, Rfl: 1    Past Medical History:   Diagnosis Date   • Acute myocardial infarction (HCC)     lateral wall   • Bradycardia    • Chest discomfort    • Coronary artery disease    • Hyperlipidemia    • Hypertension    • CASPER (obstructive sleep apnea)    • Ventricular fibrillation (HCC)        Past Surgical History:   Procedure Laterality Date   • APPENDECTOMY     • CARDIAC CATHETERIZATION N/A 9/28/2017    Procedure: Left Heart Cath;  Surgeon: Brianna Avila MD;  Location: [facility] CATH INVASIVE LOCATION;  Service:    • CARDIAC CATHETERIZATION N/A 9/28/2017    Procedure: Coronary angiography;  Surgeon: Brianna Avila MD;  Location: [facility] C[facility];  Service:    • CARDIAC CATHETERIZATION N/A 9/28/2017    Procedure: Stent PERLITA coronary;  Surgeon: Brianna Avila MD;  Location: [facility] LOC[facility];  Service:    • CARDIAC CATHETERIZATION N/A 9/28/2017    Procedure: Left ventriculography;  Surgeon: Brianna Avila MD;  Location: [facility] CATH [facility]AS[facility] LOC[facility];  Service:    • CORONARY ANGIOPLASTY     • CORONARY STENT PLACEMENT     • MA RT/LT HEART CATHETERS N/A 9/28/2017    Procedure: Percutaneous Coronary Intervention;  Surgeon: Brianna Avila MD;  Location: [facility] CATH INVASIVE LOCATION;  Service: Cardiovascular       Family History   Problem Relation Age of Onset   • Heart disease Mother    • Heart attack Mother    • Heart attack Father    • Heart disease Father    • Parkinsonism Father    • No Known Problems Sister    • No Known Problems Maternal Grandmother    • No Known Problems Maternal Grandfather    • No Known Problems Paternal Grandmother    • No Known Problems Paternal Grandfather        Social History  "    Tobacco Use   • Smoking status: Former Smoker     Packs/day: 1.50     Types: Cigarettes   • Smokeless tobacco: Never Used   • Tobacco comment: \"over 5 years ago\"    Substance Use Topics   • Alcohol use: Yes     Alcohol/week: 2.0 standard drinks     Types: 1 Cans of beer, 1 Shots of liquor per week     Comment: weekly   • Drug use: No         ECG 12 Lead    Date/Time: 10/12/2021 3:36 PM  Performed by: Brianna Avila MD  Authorized by: Brianna Avila MD   Comparison: compared with previous ECG   Similar to previous ECG  Rhythm: sinus rhythm  QRS axis: left                 Objective:     Visit Vitals  /74   Pulse 55   Ht 177.8 cm (70\")   Wt 103 kg (226 lb)   SpO2 97%   BMI 32.43 kg/m²         Constitutional:       Appearance: Normal appearance. Well-developed.   HENT:      Head: Normocephalic and atraumatic.   Neck:      Vascular: No carotid bruit or JVD.   Pulmonary:      Effort: Pulmonary effort is normal.      Breath sounds: Normal breath sounds.   Cardiovascular:      Normal rate. Regular rhythm.      No gallop.   Pulses:     Radial: 2+ bilaterally.  Edema:     Peripheral edema absent.   Abdominal:      Palpations: Abdomen is soft.   Skin:     General: Skin is warm and dry.   Neurological:      Mental Status: Alert and oriented to person, place, and time.             Assessment:          Diagnosis Plan   1. Coronary artery disease involving native coronary artery of native heart without angina pectoris     2. History of coronary artery stent placement     3. Primary hypertension     4. Hyperlipidemia, unspecified hyperlipidemia type     5. Obstructive sleep apnea            Plan:       1.  Coronary artery disease.  Appears to be stable and asymptomatic.  EKG is unremarkable.  Recent stress echocardiogram showed no evidence of ischemia.  Continue current management.  2.  Hypertension.  Well-controlled on his current regimen medications.  Continue the same.  3.  Hyperlipidemia.  On atorvastatin for goal " LDL of around 70 or below.  His last lipid panel in 4/2021 showed that his LDL was near goal at 74.  Continue current management.  4.  Obstructive sleep apnea.    I will plan on seeing the patient back again in 1 year or sooner if further issues arise.

## 2021-10-22 ENCOUNTER — IMMUNIZATION (OUTPATIENT)
Dept: VACCINE CLINIC | Facility: HOSPITAL | Age: 71
End: 2021-10-22

## 2021-10-22 PROCEDURE — 0004A ADM SARSCOV2 30MCG/0.3ML BOOSTER: CPT | Performed by: INTERNAL MEDICINE

## 2021-10-22 PROCEDURE — 91300 HC SARSCOV02 VAC 30MCG/0.3ML IM: CPT | Performed by: INTERNAL MEDICINE

## 2021-11-29 RX ORDER — LISINOPRIL 40 MG/1
TABLET ORAL
Qty: 90 TABLET | Refills: 3 | Status: SHIPPED | OUTPATIENT
Start: 2021-11-29 | End: 2022-11-14

## 2021-12-20 RX ORDER — ATORVASTATIN CALCIUM 40 MG/1
TABLET, FILM COATED ORAL
Qty: 90 TABLET | Refills: 0 | Status: SHIPPED | OUTPATIENT
Start: 2021-12-20 | End: 2022-04-11

## 2022-01-25 DIAGNOSIS — I25.10 CORONARY ARTERY DISEASE INVOLVING NATIVE CORONARY ARTERY OF NATIVE HEART WITHOUT ANGINA PECTORIS: ICD-10-CM

## 2022-01-25 RX ORDER — CLOPIDOGREL BISULFATE 75 MG/1
TABLET ORAL
Qty: 90 TABLET | Refills: 2 | Status: SHIPPED | OUTPATIENT
Start: 2022-01-25 | End: 2022-11-02

## 2022-02-16 RX ORDER — AMLODIPINE BESYLATE 5 MG/1
TABLET ORAL
Qty: 90 TABLET | Refills: 2 | Status: SHIPPED | OUTPATIENT
Start: 2022-02-16 | End: 2022-11-14

## 2022-04-11 RX ORDER — ATORVASTATIN CALCIUM 40 MG/1
TABLET, FILM COATED ORAL
Qty: 90 TABLET | Refills: 1 | Status: SHIPPED | OUTPATIENT
Start: 2022-04-11 | End: 2022-09-29

## 2022-09-29 RX ORDER — ATORVASTATIN CALCIUM 40 MG/1
TABLET, FILM COATED ORAL
Qty: 90 TABLET | Refills: 0 | Status: SHIPPED | OUTPATIENT
Start: 2022-09-29 | End: 2022-12-28

## 2022-10-18 ENCOUNTER — OFFICE VISIT (OUTPATIENT)
Dept: CARDIOLOGY | Facility: CLINIC | Age: 72
End: 2022-10-18

## 2022-10-18 VITALS
HEIGHT: 71 IN | DIASTOLIC BLOOD PRESSURE: 74 MMHG | SYSTOLIC BLOOD PRESSURE: 124 MMHG | HEART RATE: 53 BPM | WEIGHT: 223.8 LBS | BODY MASS INDEX: 31.33 KG/M2

## 2022-10-18 DIAGNOSIS — I25.10 CORONARY ARTERY DISEASE INVOLVING NATIVE CORONARY ARTERY OF NATIVE HEART WITHOUT ANGINA PECTORIS: Primary | ICD-10-CM

## 2022-10-18 DIAGNOSIS — R06.09 DYSPNEA ON EXERTION: ICD-10-CM

## 2022-10-18 DIAGNOSIS — Z95.5 HISTORY OF CORONARY ARTERY STENT PLACEMENT: ICD-10-CM

## 2022-10-18 DIAGNOSIS — I10 PRIMARY HYPERTENSION: ICD-10-CM

## 2022-10-18 DIAGNOSIS — E78.5 HYPERLIPIDEMIA, UNSPECIFIED HYPERLIPIDEMIA TYPE: ICD-10-CM

## 2022-10-18 DIAGNOSIS — G47.33 OBSTRUCTIVE SLEEP APNEA: ICD-10-CM

## 2022-10-18 PROCEDURE — 93000 ELECTROCARDIOGRAM COMPLETE: CPT | Performed by: INTERNAL MEDICINE

## 2022-10-18 PROCEDURE — 99214 OFFICE O/P EST MOD 30 MIN: CPT | Performed by: INTERNAL MEDICINE

## 2022-11-02 ENCOUNTER — HOSPITAL ENCOUNTER (OUTPATIENT)
Dept: CARDIOLOGY | Facility: HOSPITAL | Age: 72
Discharge: HOME | End: 2022-11-02
Admitting: INTERNAL MEDICINE

## 2022-11-02 VITALS
HEART RATE: 70 BPM | HEIGHT: 71 IN | BODY MASS INDEX: 31.22 KG/M2 | SYSTOLIC BLOOD PRESSURE: 130 MMHG | WEIGHT: 223 LBS | DIASTOLIC BLOOD PRESSURE: 64 MMHG

## 2022-11-02 DIAGNOSIS — R06.09 DYSPNEA ON EXERTION: ICD-10-CM

## 2022-11-02 DIAGNOSIS — I25.10 CORONARY ARTERY DISEASE INVOLVING NATIVE CORONARY ARTERY OF NATIVE HEART WITHOUT ANGINA PECTORIS: ICD-10-CM

## 2022-11-02 LAB
AORTIC ARCH: 2.5 CM
ASCENDING AORTA: 3.2 CM
BH CV ECHO MEAS - ACS: 2 CM
BH CV ECHO MEAS - AO MAX PG: 8 MMHG
BH CV ECHO MEAS - AO MEAN PG: 4 MMHG
BH CV ECHO MEAS - AO ROOT DIAM: 3.9 CM
BH CV ECHO MEAS - AO V2 MAX: 141 CM/SEC
BH CV ECHO MEAS - AO V2 VTI: 30.9 CM
BH CV ECHO MEAS - AVA(I,D): 2.8 CM2
BH CV ECHO MEAS - EDV(CUBED): 91.9 ML
BH CV ECHO MEAS - EDV(MOD-SP2): 113 ML
BH CV ECHO MEAS - EDV(MOD-SP4): 104 ML
BH CV ECHO MEAS - EF(MOD-BP): 65.5 %
BH CV ECHO MEAS - EF(MOD-SP2): 64.6 %
BH CV ECHO MEAS - EF(MOD-SP4): 62.5 %
BH CV ECHO MEAS - ESV(CUBED): 17 ML
BH CV ECHO MEAS - ESV(MOD-SP2): 40 ML
BH CV ECHO MEAS - ESV(MOD-SP4): 39 ML
BH CV ECHO MEAS - FS: 43 %
BH CV ECHO MEAS - IVS/LVPW: 1.04 CM
BH CV ECHO MEAS - IVSD: 1.05 CM
BH CV ECHO MEAS - LAT PEAK E' VEL: 9.7 CM/SEC
BH CV ECHO MEAS - LV DIASTOLIC VOL/BSA (35-75): 47.1 CM2
BH CV ECHO MEAS - LV MASS(C)D: 159.6 GRAMS
BH CV ECHO MEAS - LV MAX PG: 7.4 MMHG
BH CV ECHO MEAS - LV MEAN PG: 3 MMHG
BH CV ECHO MEAS - LV SYSTOLIC VOL/BSA (12-30): 17.7 CM2
BH CV ECHO MEAS - LV V1 MAX: 136 CM/SEC
BH CV ECHO MEAS - LV V1 VTI: 28 CM
BH CV ECHO MEAS - LVIDD: 4.5 CM
BH CV ECHO MEAS - LVIDS: 2.6 CM
BH CV ECHO MEAS - LVOT AREA: 3.1 CM2
BH CV ECHO MEAS - LVOT DIAM: 1.97 CM
BH CV ECHO MEAS - LVPWD: 1.01 CM
BH CV ECHO MEAS - MED PEAK E' VEL: 9.4 CM/SEC
BH CV ECHO MEAS - MR MAX PG: 78.8 MMHG
BH CV ECHO MEAS - MR MAX VEL: 443.9 CM/SEC
BH CV ECHO MEAS - MV A DUR: 0.09 SEC
BH CV ECHO MEAS - MV A MAX VEL: 89.5 CM/SEC
BH CV ECHO MEAS - MV DEC SLOPE: 535 CM/SEC2
BH CV ECHO MEAS - MV DEC TIME: 0.14 MSEC
BH CV ECHO MEAS - MV E MAX VEL: 108.4 CM/SEC
BH CV ECHO MEAS - MV E/A: 1.21
BH CV ECHO MEAS - MV MAX PG: 5.4 MMHG
BH CV ECHO MEAS - MV MEAN PG: 1.64 MMHG
BH CV ECHO MEAS - MV P1/2T: 60.1 MSEC
BH CV ECHO MEAS - MV V2 VTI: 40.1 CM
BH CV ECHO MEAS - MVA(P1/2T): 3.7 CM2
BH CV ECHO MEAS - MVA(VTI): 2.13 CM2
BH CV ECHO MEAS - PA ACC TIME: 0.1 SEC
BH CV ECHO MEAS - PA PR(ACCEL): 36.2 MMHG
BH CV ECHO MEAS - PA V2 MAX: 111.3 CM/SEC
BH CV ECHO MEAS - PULM A REVS DUR: 0.11 SEC
BH CV ECHO MEAS - PULM A REVS VEL: 35.5 CM/SEC
BH CV ECHO MEAS - PULM DIAS VEL: 39.3 CM/SEC
BH CV ECHO MEAS - PULM S/D: 1.58
BH CV ECHO MEAS - PULM SYS VEL: 62.2 CM/SEC
BH CV ECHO MEAS - QP/QS: 0.7
BH CV ECHO MEAS - RV MAX PG: 4.3 MMHG
BH CV ECHO MEAS - RV V1 MAX: 103.3 CM/SEC
BH CV ECHO MEAS - RV V1 VTI: 15.5 CM
BH CV ECHO MEAS - RVOT DIAM: 2.22 CM
BH CV ECHO MEAS - SI(MOD-SP2): 33.1 ML/M2
BH CV ECHO MEAS - SI(MOD-SP4): 29.4 ML/M2
BH CV ECHO MEAS - SUP REN AO DIAM: 1.9 CM
BH CV ECHO MEAS - SV(LVOT): 85.4 ML
BH CV ECHO MEAS - SV(MOD-SP2): 73 ML
BH CV ECHO MEAS - SV(MOD-SP4): 65 ML
BH CV ECHO MEAS - SV(RVOT): 60 ML
BH CV ECHO MEASUREMENTS AVERAGE E/E' RATIO: 11.35
BH CV XLRA - RV BASE: 3.6 CM
BH CV XLRA - RV LENGTH: 6.7 CM
BH CV XLRA - RV MID: 2.49 CM
BH CV XLRA - TDI S': 14.4 CM/SEC
LEFT ATRIUM VOLUME INDEX: 18.8 ML/M2
MAXIMAL PREDICTED HEART RATE: 149 BPM
SINUS: 3.5 CM
STJ: 2.8 CM
STRESS TARGET HR: 127 BPM

## 2022-11-02 PROCEDURE — 93306 TTE W/DOPPLER COMPLETE: CPT | Performed by: INTERNAL MEDICINE

## 2022-11-02 PROCEDURE — 93306 TTE W/DOPPLER COMPLETE: CPT

## 2022-11-02 RX ORDER — CLOPIDOGREL BISULFATE 75 MG/1
TABLET ORAL
Qty: 90 TABLET | Refills: 3 | Status: SHIPPED | OUTPATIENT
Start: 2022-11-02

## 2022-11-04 NOTE — PROGRESS NOTES
I called and discussed normal echo results.  Asked him to call if dyspnea worsens or is unchanged at which point may consider stress test.  Otherwise will see as scheduled in April.

## 2022-11-14 RX ORDER — LISINOPRIL 40 MG/1
TABLET ORAL
Qty: 90 TABLET | Refills: 3 | Status: SHIPPED | OUTPATIENT
Start: 2022-11-14

## 2022-11-14 RX ORDER — AMLODIPINE BESYLATE 5 MG/1
TABLET ORAL
Qty: 90 TABLET | Refills: 3 | Status: SHIPPED | OUTPATIENT
Start: 2022-11-14

## 2022-12-28 RX ORDER — ATORVASTATIN CALCIUM 40 MG/1
TABLET, FILM COATED ORAL
Qty: 90 TABLET | Refills: 1 | Status: SHIPPED | OUTPATIENT
Start: 2022-12-28

## 2023-04-10 ENCOUNTER — LAB (OUTPATIENT)
Dept: LAB | Facility: HOSPITAL | Age: 73
End: 2023-04-10
Payer: MEDICARE

## 2023-04-10 ENCOUNTER — TRANSCRIBE ORDERS (OUTPATIENT)
Dept: CARDIOLOGY | Facility: CLINIC | Age: 73
End: 2023-04-10
Payer: MEDICARE

## 2023-04-10 ENCOUNTER — OFFICE VISIT (OUTPATIENT)
Dept: CARDIOLOGY | Facility: CLINIC | Age: 73
End: 2023-04-10
Payer: MEDICARE

## 2023-04-10 VITALS
WEIGHT: 227.2 LBS | BODY MASS INDEX: 31.81 KG/M2 | SYSTOLIC BLOOD PRESSURE: 130 MMHG | HEART RATE: 65 BPM | DIASTOLIC BLOOD PRESSURE: 80 MMHG | HEIGHT: 71 IN

## 2023-04-10 DIAGNOSIS — E78.5 HYPERLIPIDEMIA, UNSPECIFIED HYPERLIPIDEMIA TYPE: Primary | ICD-10-CM

## 2023-04-10 DIAGNOSIS — Z01.810 PRE-OPERATIVE CARDIOVASCULAR EXAMINATION: ICD-10-CM

## 2023-04-10 DIAGNOSIS — Z13.6 SCREENING FOR ISCHEMIC HEART DISEASE: ICD-10-CM

## 2023-04-10 DIAGNOSIS — I10 PRIMARY HYPERTENSION: ICD-10-CM

## 2023-04-10 DIAGNOSIS — I25.10 CORONARY ARTERY DISEASE INVOLVING NATIVE CORONARY ARTERY OF NATIVE HEART WITHOUT ANGINA PECTORIS: ICD-10-CM

## 2023-04-10 DIAGNOSIS — Z95.5 HISTORY OF CORONARY ARTERY STENT PLACEMENT: ICD-10-CM

## 2023-04-10 DIAGNOSIS — Z01.810 PRE-OPERATIVE CARDIOVASCULAR EXAMINATION: Primary | ICD-10-CM

## 2023-04-10 LAB
ANION GAP SERPL CALCULATED.3IONS-SCNC: 7.1 MMOL/L (ref 5–15)
BASOPHILS # BLD AUTO: 0.04 10*3/MM3 (ref 0–0.2)
BASOPHILS NFR BLD AUTO: 0.5 % (ref 0–1.5)
BUN SERPL-MCNC: 23 MG/DL (ref 8–23)
BUN/CREAT SERPL: 19 (ref 7–25)
CALCIUM SPEC-SCNC: 9.7 MG/DL (ref 8.6–10.5)
CHLORIDE SERPL-SCNC: 105 MMOL/L (ref 98–107)
CO2 SERPL-SCNC: 27.9 MMOL/L (ref 22–29)
CREAT SERPL-MCNC: 1.21 MG/DL (ref 0.76–1.27)
DEPRECATED RDW RBC AUTO: 40 FL (ref 37–54)
EGFRCR SERPLBLD CKD-EPI 2021: 64 ML/MIN/1.73
EOSINOPHIL # BLD AUTO: 0.08 10*3/MM3 (ref 0–0.4)
EOSINOPHIL NFR BLD AUTO: 1 % (ref 0.3–6.2)
ERYTHROCYTE [DISTWIDTH] IN BLOOD BY AUTOMATED COUNT: 12 % (ref 12.3–15.4)
GLUCOSE SERPL-MCNC: 84 MG/DL (ref 65–99)
HCT VFR BLD AUTO: 42.8 % (ref 37.5–51)
HGB BLD-MCNC: 14.7 G/DL (ref 13–17.7)
IMM GRANULOCYTES # BLD AUTO: 0.01 10*3/MM3 (ref 0–0.05)
IMM GRANULOCYTES NFR BLD AUTO: 0.1 % (ref 0–0.5)
LYMPHOCYTES # BLD AUTO: 2.87 10*3/MM3 (ref 0.7–3.1)
LYMPHOCYTES NFR BLD AUTO: 37.4 % (ref 19.6–45.3)
MCH RBC QN AUTO: 31.1 PG (ref 26.6–33)
MCHC RBC AUTO-ENTMCNC: 34.3 G/DL (ref 31.5–35.7)
MCV RBC AUTO: 90.5 FL (ref 79–97)
MONOCYTES # BLD AUTO: 0.66 10*3/MM3 (ref 0.1–0.9)
MONOCYTES NFR BLD AUTO: 8.6 % (ref 5–12)
NEUTROPHILS NFR BLD AUTO: 4.01 10*3/MM3 (ref 1.7–7)
NEUTROPHILS NFR BLD AUTO: 52.4 % (ref 42.7–76)
NRBC BLD AUTO-RTO: 0 /100 WBC (ref 0–0.2)
PLATELET # BLD AUTO: 222 10*3/MM3 (ref 140–450)
PMV BLD AUTO: 9.6 FL (ref 6–12)
POTASSIUM SERPL-SCNC: 4.5 MMOL/L (ref 3.5–5.2)
RBC # BLD AUTO: 4.73 10*6/MM3 (ref 4.14–5.8)
SODIUM SERPL-SCNC: 140 MMOL/L (ref 136–145)
WBC NRBC COR # BLD: 7.67 10*3/MM3 (ref 3.4–10.8)

## 2023-04-10 PROCEDURE — 36415 COLL VENOUS BLD VENIPUNCTURE: CPT

## 2023-04-10 PROCEDURE — 3075F SYST BP GE 130 - 139MM HG: CPT | Performed by: NURSE PRACTITIONER

## 2023-04-10 PROCEDURE — 1159F MED LIST DOCD IN RCRD: CPT | Performed by: NURSE PRACTITIONER

## 2023-04-10 PROCEDURE — 85025 COMPLETE CBC W/AUTO DIFF WBC: CPT

## 2023-04-10 PROCEDURE — 3079F DIAST BP 80-89 MM HG: CPT | Performed by: NURSE PRACTITIONER

## 2023-04-10 PROCEDURE — 99214 OFFICE O/P EST MOD 30 MIN: CPT | Performed by: NURSE PRACTITIONER

## 2023-04-10 PROCEDURE — 1160F RVW MEDS BY RX/DR IN RCRD: CPT | Performed by: NURSE PRACTITIONER

## 2023-04-10 PROCEDURE — 80048 BASIC METABOLIC PNL TOTAL CA: CPT

## 2023-04-10 PROCEDURE — 93000 ELECTROCARDIOGRAM COMPLETE: CPT | Performed by: NURSE PRACTITIONER

## 2023-04-10 RX ORDER — TAMSULOSIN HYDROCHLORIDE 0.4 MG/1
CAPSULE ORAL
COMMUNITY

## 2023-04-10 NOTE — PROGRESS NOTES
Subjective:     Encounter Date:04/10/2023      Patient ID: Frantz Dinh is a 72 y.o. male.    Chief Complaint:follow up CAD  History of Present Illness  This is a 71 y/o man who follows with Dr. Avila and is new to me today. He has a pmhx of hypertension, hyperlipidemia, coronary artery disease s/p lateral myocardial infarction and drug eluting stent placement of his proximal to mid Lad and proximal first diagonal branch. Dr. Avila began following the patient in 2017 when he presented with a lateral MI. He was playing golf and developed chest pressure, diaphoresis and nausea. In route to the hospital, he went in to ventricular fibrillation arrest and responded to defibrillation. He was He was brought emergently to the cardiac catheterization laboratory where he was found to have an occlusion of his first diagonal branch and an 80% stenosis of his mid LAD.  He subsequently underwent drug-eluting stent placement of both.  His initial echocardiogram showed low normal left ventricular systolic function with an ejection fraction of 48% with apical akinesis, and no significant valvular disease.      At follow up in November 2017, he had a repeat echocardiogram that showed normalization of his LV function and wall motion with an EF of 64%. During his follow ups, he had complaints of dyspnea. He was switched from ticagrelor to clopidogrel and was recommended to work on weight loss. At follow up in 2018, he seemed to be doing better.    He then returned in March 2019 for chest heaviness.  He felt it was similar to his MI. He was set up for a stress test and echocardiogram, both of which were unremarkable. Then in follow up in April 2019, he was noted to be bradycardic and metoprolol was stopped.     In 6/2021 he was continuing to complain of dyspnea on exertion routine follow-up with CAYLA Morris.  A stress echocardiogram was ordered at that time and performed on 6/30/2021.  This showed normal left ventricular  systolic function wall motion with an EF of 58% and no significant valvular disease.  Stress portion showed no evidence of ischemia.    He then saw Dr. Avila in October 2022 and was still having dyspnea on exertion. An echocardiogram was obtained that again was unremarkable. He was instructed to return in 6 months. He is here today for a follow up visit. He says his dyspnea is worse, especially over the last couple of months. Walking in from the parking lot today did not make him short of breath. However, this morning while picking up golf balls and moving things at work, he became short of breath. Yesterday, he fertilized his yard and it took an hour due to having to stop and rest. He says it should have been a 15 minute job. Every once in a while he gets a sharp midsternal chest pain that occurs at rest and resolves on its own after a few minutes. He denies any swelling, orthopnea or PND. His most concerning episodes was a couple of week ago. 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 This was very similar to what he experienced prior to his heart attack. He had to sit down for about an hour before he felt better.     I have reviewed and updated as appropriate allergies, current medications, past family history, past medical history, past surgical history and problem list.    Review of Systems   Constitutional: Positive for diaphoresis and malaise/fatigue. Negative for fever, weight gain and weight loss.   HENT: Negative for congestion, hoarse voice and sore throat.    Eyes: Negative for blurred vision and double vision.   Cardiovascular: Positive for chest pain and dyspnea on exertion. Negative for leg swelling, orthopnea, palpitations and syncope.   Respiratory: Positive for shortness of breath. Negative for cough and wheezing.    Gastrointestinal: Positive for nausea and vomiting. Negative for abdominal pain, hematemesis, hematochezia and  melena.   Genitourinary: Negative for dysuria and hematuria.   Neurological: Positive for light-headedness. Negative for dizziness, headaches and numbness.   Psychiatric/Behavioral: Negative for depression. The patient is not nervous/anxious.          Current Outpatient Medications:   •  amLODIPine (NORVASC) 5 MG tablet, Take 1 tablet by mouth once daily, Disp: 90 tablet, Rfl: 3  •  aspirin 81 MG EC tablet, Take 1 tablet by mouth Daily., Disp: , Rfl:   •  atorvastatin (LIPITOR) 40 MG tablet, Take 1 tablet by mouth once daily, Disp: 90 tablet, Rfl: 1  •  clopidogrel (PLAVIX) 75 MG tablet, Take 1 tablet by mouth once daily, Disp: 90 tablet, Rfl: 3  •  indomethacin (INDOCIN) 50 MG capsule, Take 1 capsule by mouth 2 (Two) Times a Day With Meals., Disp: , Rfl:   •  lisinopril (PRINIVIL,ZESTRIL) 40 MG tablet, Take 1 tablet by mouth once daily, Disp: 90 tablet, Rfl: 3  •  Loratadine 10 MG capsule, Take  by mouth., Disp: , Rfl:   •  pantoprazole (PROTONIX) 40 MG EC tablet, TAKE ONE TABLET BY MOUTH ONCE DAILY, Disp: 90 tablet, Rfl: 1  •  tamsulosin (FLOMAX) 0.4 MG capsule 24 hr capsule, , Disp: , Rfl:     Past Medical History:   Diagnosis Date   • Acute myocardial infarction     lateral wall   • Bradycardia    • Chest discomfort    • Coronary artery disease    • Hyperlipidemia    • Hypertension    • CASPER (obstructive sleep apnea)    • Ventricular fibrillation        Past Surgical History:   Procedure Laterality Date   • APPENDECTOMY     • CARDIAC CATHETERIZATION N/A 9/28/2017    Procedure: Left Heart Cath;  Surgeon: Brianna Avila MD;  Location: [facility] CATH INVASIVE LOCATION;  Service:    • CARDIAC CATHETERIZATION N/A 9/28/2017    Procedure: Coronary angiography;  Surgeon: Brianna Avila MD;  Location: [facility] CATH INVASIVE LOCATION;  Service:    • CARDIAC CATHETERIZATION N/A 9/28/2017    Procedure: Stent PERLITA coronary;  Surgeon: Brianna Avila MD;  Location: [facility] CATH INVASIVE LOCATION;  Service:    • CARDIAC  "CATHETERIZATION N/A 9/28/2017    Procedure: Left ventriculography;  Surgeon: Brianna Avila MD;  Location:  [facility] CATH INVASIVE LOCATION;  Service:    • CORONARY ANGIOPLASTY     • CORONARY STENT PLACEMENT     • ID RT/LT HEART CATHETERS N/A 9/28/2017    Procedure: Percutaneous Coronary Intervention;  Surgeon: Brianna Avila MD;  Location:  [facility] CATH INVASIVE LOCATION;  Service: Cardiovascular       Family History   Problem Relation Age of Onset   • Heart disease Mother    • Heart attack Mother    • Heart attack Father    • Heart disease Father    • Parkinsonism Father    • No Known Problems Sister    • No Known Problems Maternal Grandmother    • No Known Problems Maternal Grandfather    • No Known Problems Paternal Grandmother    • No Known Problems Paternal Grandfather        Social History     Tobacco Use   • Smoking status: Former     Packs/day: 1.50     Types: Cigarettes   • Smokeless tobacco: Never   • Tobacco comments:     \"over 5 years ago\"    Substance Use Topics   • Alcohol use: Yes     Alcohol/week: 2.0 standard drinks     Types: 1 Cans of beer, 1 Shots of liquor per week     Comment: weekly   • Drug use: No         ECG 12 Lead    Date/Time: 4/10/2023 3:56 PM  Performed by: Denise Faria APRN  Authorized by: Denise Faria APRN   Comparison: compared with previous ECG from 10/18/2022  Similar to previous ECG  Rhythm: sinus rhythm  Other findings: non-specific ST-T wave changes  Comments: No significant change from previous EKG               Objective:     Visit Vitals  /80   Pulse 65   Ht 180.3 cm (71\")   Wt 103 kg (227 lb 3.2 oz)   BMI 31.69 kg/m²             Physical Exam  Constitutional:       Appearance: Normal appearance. He is obese.   HENT:      Head: Normocephalic.   Neck:      Vascular: No carotid bruit.   Cardiovascular:      Rate and Rhythm: Normal rate and regular rhythm.      Chest Wall: PMI is not displaced.      Pulses: Normal pulses.           Radial pulses are 2+ on the right " side and 2+ on the left side.        Posterior tibial pulses are 2+ on the right side and 2+ on the left side.      Heart sounds: Normal heart sounds. No murmur heard.    No friction rub. No gallop.   Pulmonary:      Effort: Pulmonary effort is normal.      Breath sounds: Normal breath sounds.   Abdominal:      General: Bowel sounds are normal. There is no distension.      Palpations: Abdomen is soft.   Musculoskeletal:      Right lower leg: No edema.      Left lower leg: No edema.   Skin:     General: Skin is warm and dry.      Capillary Refill: Capillary refill takes less than 2 seconds.   Neurological:      Mental Status: He is alert and oriented to person, place, and time.   Psychiatric:         Mood and Affect: Mood normal.         Behavior: Behavior normal.         Thought Content: Thought content normal.          Lab Review:         Cardiac Procedures:   1. Echocardiogram 11/02/2022:  Left Ventricle Left ventricular systolic function is normal. Left ventricular ejection fraction appears to be 61 - 65%.     Normal left ventricular cavity size and wall thickness noted. All left ventricular wall segments contract normally. Left ventricular diastolic function was normal.   Right Ventricle Normal right ventricular cavity size and systolic function noted.   Left Atrium Normal left atrial size and volume noted.   Right Atrium Normal right atrial cavity size noted.   Aortic Valve The aortic valve is abnormal in structure. The aortic valve exhibits sclerosis. No significant aortic valve regurgitation is present. No hemodynamically significant aortic valve stenosis is present.   Mitral Valve The mitral valve is structurally normal with no significant stenosis present. Trace mitral valve regurgitation is present.   Tricuspid Valve Trace tricuspid valve regurgitation is present. Insufficient TR velocity profile to estimate the right ventricular systolic pressure.   Pulmonic Valve The pulmonic valve is not well  visualized.   Greater Vessels No dilation of the aortic root is present. The inferior vena cava is normally sized. Partial IVC inspiratory collapse of less than 50% noted.   Pericardium There is no evidence of pericardial effusion.      2. Stress echocardiogram 6/30/21:  • Estimated right ventricular systolic pressure from tricuspid regurgitation is normal (<35 mmHg).  • Calculated left ventricular EF = 58% Estimated left ventricular EF was in agreement with the calculated left ventricular EF. Left ventricular systolic function is normal.  • Left ventricular diastolic function was normal.  • Normal stress echo with no significant echocardiographic evidence for myocardial ischemia.  • Post EF=73%.  3. Nuclear stress test 3/18/19:  • Myocardial perfusion imaging indicates a normal myocardial perfusion study with no evidence of ischemia.  • Left ventricular ejection fraction is normal (Calculated EF = 61%).  • Impressions are consistent with a low risk study.  • There is no prior study available for comparison.  • GI artifact is present.     4. Cardiac catheterization 9/28/17:     FINDINGS:     CORONARY ANGIOGRAPHY:   1.  Left main: 0% stenosis.  The vessel bifurcates into a left anterior descending and left circumflex arteries.  2.  Left anterior descending artery: Proximal LAD 0% stenosis.  A large first diagonal branch is seen originating high from the LAD and has 100% proximal occlusion.  The LAD just after the origin of the first diagonal branch has diffuse 80% followed by a 90% stenosis.  The remainder of the mid to distal LAD has 0% stenosis.  3.  Left circumflex artery: 0% ostial stenosis.  A large first obtuse marginal branch is seen originating high from the circumflex artery.  This has 20-30% proximal stenosis.  The mid left circumflex a proximal artery has 0% stenosis.  The distal left circumflex flex artery gives off a moderate-sized second obtuse marginal branch with scattered 10% stenosis.  The distal  left circumflex after the origin of the second obtuse marginal branch and tapers to small vessel appears to terminate in the AV groove.  4.  Right coronary artery: 30-40% proximal stenosis.  The distal right coronary artery has a 50% stenosis.  The vessel bifurcates into a large posterior descending and small posterolateral arteries.  Posterior descending artery has a 30% mid stenosis.  The posterior lateral artery has scattered 10% stenosis.     LEFT VENTRICULAR HEMODYNAMICS:    1.  Left ventricular pressure: 143/18  2.  Aortic pressure: 138/59     LEFT VENTRICULOGRAPHY: Hypokinesis of the mid to distal anterior wall and apex.  The other wall segments appear to be jose juan normally.  Overall left ventricular systolic function appears to be preserved with an estimated ejection fraction of 55%.         Assessment:         Diagnoses and all orders for this visit:    1. Hyperlipidemia, unspecified hyperlipidemia type (Primary)    2. Primary hypertension    3. Coronary artery disease involving native coronary artery of native heart without angina pectoris    4. History of coronary artery stent placement            Plan:       1. CAD with unstable angina: s/p PCI with stent in 2017 to the first diagonal and LAD. Now with symptoms concernign for his anginal equivalent. EKG is pretty stable. I reviewed his cardiac catheterization films with Dr. Dietz. Given his symptoms and coronary anatomy, it was recommended that he proceed with a cardiac catheterization for further evaluation. Continue aspirin, clopidogrel, statin and lisinopril. No beta blocker due to bradycardia in the past.  2. HTN: blood pressure well controlled on current regimen. No changes.  3. HLD: on statin. Goal LDL < 70.    Thank you for allowing me to participate in this patient's care. Please call with any questions or concerns. Mr. Dinh will follow up with Dr. Avila post catheterization.          Your medication list          Accurate as of April 10,  2023 12:04 PM. If you have any questions, ask your nurse or doctor.            CONTINUE taking these medications      Instructions Last Dose Given Next Dose Due   amLODIPine 5 MG tablet  Commonly known as: NORVASC      Take 1 tablet by mouth once daily       aspirin 81 MG EC tablet      Take 1 tablet by mouth Daily.       atorvastatin 40 MG tablet  Commonly known as: LIPITOR      Take 1 tablet by mouth once daily       clopidogrel 75 MG tablet  Commonly known as: PLAVIX      Take 1 tablet by mouth once daily       indomethacin 50 MG capsule  Commonly known as: INDOCIN      Take 1 capsule by mouth 2 (Two) Times a Day With Meals.       lisinopril 40 MG tablet  Commonly known as: PRINIVIL,ZESTRIL      Take 1 tablet by mouth once daily       Loratadine 10 MG capsule      Take  by mouth.       pantoprazole 40 MG EC tablet  Commonly known as: PROTONIX      TAKE ONE TABLET BY MOUTH ONCE DAILY       tamsulosin 0.4 MG capsule 24 hr capsule  Commonly known as: FLOMAX              STOP taking these medications    ferrous sulfate 325 (65 FE) MG tablet                 CAYLA Reyes  04/10/23  12:02 PM EDT

## 2023-04-10 NOTE — H&P (VIEW-ONLY)
Subjective:     Encounter Date:04/10/2023      Patient ID: Frantz Dinh is a 72 y.o. male.    Chief Complaint:follow up CAD  History of Present Illness  This is a 71 y/o man who follows with Dr. Avila and is new to me today. He has a pmhx of hypertension, hyperlipidemia, coronary artery disease s/p lateral myocardial infarction and drug eluting stent placement of his proximal to mid Lad and proximal first diagonal branch. Dr. Avila began following the patient in 2017 when he presented with a lateral MI. He was playing golf and developed chest pressure, diaphoresis and nausea. In route to the hospital, he went in to ventricular fibrillation arrest and responded to defibrillation. He was He was brought emergently to the cardiac catheterization laboratory where he was found to have an occlusion of his first diagonal branch and an 80% stenosis of his mid LAD.  He subsequently underwent drug-eluting stent placement of both.  His initial echocardiogram showed low normal left ventricular systolic function with an ejection fraction of 48% with apical akinesis, and no significant valvular disease.      At follow up in November 2017, he had a repeat echocardiogram that showed normalization of his LV function and wall motion with an EF of 64%. During his follow ups, he had complaints of dyspnea. He was switched from ticagrelor to clopidogrel and was recommended to work on weight loss. At follow up in 2018, he seemed to be doing better.    He then returned in March 2019 for chest heaviness.  He felt it was similar to his MI. He was set up for a stress test and echocardiogram, both of which were unremarkable. Then in follow up in April 2019, he was noted to be bradycardic and metoprolol was stopped.     In 6/2021 he was continuing to complain of dyspnea on exertion routine follow-up with CAYLA Morris.  A stress echocardiogram was ordered at that time and performed on 6/30/2021.  This showed normal left ventricular  systolic function wall motion with an EF of 58% and no significant valvular disease.  Stress portion showed no evidence of ischemia.    He then saw Dr. Avila in October 2022 and was still having dyspnea on exertion. An echocardiogram was obtained that again was unremarkable. He was instructed to return in 6 months. He is here today for a follow up visit. He says his dyspnea is worse, especially over the last couple of months. Walking in from the parking lot today did not make him short of breath. However, this morning while picking up golf balls and moving things at work, he became short of breath. Yesterday, he fertilized his yard and it took an hour due to having to stop and rest. He says it should have been a 15 minute job. Every once in a while he gets a sharp midsternal chest pain that occurs at rest and resolves on its own after a few minutes. He denies any swelling, orthopnea or PND. His most concerning episodes was a couple of week ago. 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 This was very similar to what he experienced prior to his heart attack. He had to sit down for about an hour before he felt better.     I have reviewed and updated as appropriate allergies, current medications, past family history, past medical history, past surgical history and problem list.    Review of Systems   Constitutional: Positive for diaphoresis and malaise/fatigue. Negative for fever, weight gain and weight loss.   HENT: Negative for congestion, hoarse voice and sore throat.    Eyes: Negative for blurred vision and double vision.   Cardiovascular: Positive for chest pain and dyspnea on exertion. Negative for leg swelling, orthopnea, palpitations and syncope.   Respiratory: Positive for shortness of breath. Negative for cough and wheezing.    Gastrointestinal: Positive for nausea and vomiting. Negative for abdominal pain, hematemesis, hematochezia and  melena.   Genitourinary: Negative for dysuria and hematuria.   Neurological: Positive for light-headedness. Negative for dizziness, headaches and numbness.   Psychiatric/Behavioral: Negative for depression. The patient is not nervous/anxious.          Current Outpatient Medications:   •  amLODIPine (NORVASC) 5 MG tablet, Take 1 tablet by mouth once daily, Disp: 90 tablet, Rfl: 3  •  aspirin 81 MG EC tablet, Take 1 tablet by mouth Daily., Disp: , Rfl:   •  atorvastatin (LIPITOR) 40 MG tablet, Take 1 tablet by mouth once daily, Disp: 90 tablet, Rfl: 1  •  clopidogrel (PLAVIX) 75 MG tablet, Take 1 tablet by mouth once daily, Disp: 90 tablet, Rfl: 3  •  indomethacin (INDOCIN) 50 MG capsule, Take 1 capsule by mouth 2 (Two) Times a Day With Meals., Disp: , Rfl:   •  lisinopril (PRINIVIL,ZESTRIL) 40 MG tablet, Take 1 tablet by mouth once daily, Disp: 90 tablet, Rfl: 3  •  Loratadine 10 MG capsule, Take  by mouth., Disp: , Rfl:   •  pantoprazole (PROTONIX) 40 MG EC tablet, TAKE ONE TABLET BY MOUTH ONCE DAILY, Disp: 90 tablet, Rfl: 1  •  tamsulosin (FLOMAX) 0.4 MG capsule 24 hr capsule, , Disp: , Rfl:     Past Medical History:   Diagnosis Date   • Acute myocardial infarction     lateral wall   • Bradycardia    • Chest discomfort    • Coronary artery disease    • Hyperlipidemia    • Hypertension    • CASPER (obstructive sleep apnea)    • Ventricular fibrillation        Past Surgical History:   Procedure Laterality Date   • APPENDECTOMY     • CARDIAC CATHETERIZATION N/A 9/28/2017    Procedure: Left Heart Cath;  Surgeon: Brianna Avila MD;  Location: [facility] CATH INVASIVE LOCATION;  Service:    • CARDIAC CATHETERIZATION N/A 9/28/2017    Procedure: Coronary angiography;  Surgeon: Brianna Avila MD;  Location: [facility] CATH INVASIVE LOCATION;  Service:    • CARDIAC CATHETERIZATION N/A 9/28/2017    Procedure: Stent PERLITA coronary;  Surgeon: Brianna Avila MD;  Location: [facility] CATH INVASIVE LOCATION;  Service:    • CARDIAC  "CATHETERIZATION N/A 9/28/2017    Procedure: Left ventriculography;  Surgeon: Brianna Avila MD;  Location:  [facility] CATH INVASIVE LOCATION;  Service:    • CORONARY ANGIOPLASTY     • CORONARY STENT PLACEMENT     • TX RT/LT HEART CATHETERS N/A 9/28/2017    Procedure: Percutaneous Coronary Intervention;  Surgeon: Brianna Avila MD;  Location:  [facility] CATH INVASIVE LOCATION;  Service: Cardiovascular       Family History   Problem Relation Age of Onset   • Heart disease Mother    • Heart attack Mother    • Heart attack Father    • Heart disease Father    • Parkinsonism Father    • No Known Problems Sister    • No Known Problems Maternal Grandmother    • No Known Problems Maternal Grandfather    • No Known Problems Paternal Grandmother    • No Known Problems Paternal Grandfather        Social History     Tobacco Use   • Smoking status: Former     Packs/day: 1.50     Types: Cigarettes   • Smokeless tobacco: Never   • Tobacco comments:     \"over 5 years ago\"    Substance Use Topics   • Alcohol use: Yes     Alcohol/week: 2.0 standard drinks     Types: 1 Cans of beer, 1 Shots of liquor per week     Comment: weekly   • Drug use: No         ECG 12 Lead    Date/Time: 4/10/2023 3:56 PM  Performed by: Denise Faria APRN  Authorized by: Denise Faria APRN   Comparison: compared with previous ECG from 10/18/2022  Similar to previous ECG  Rhythm: sinus rhythm  Other findings: non-specific ST-T wave changes  Comments: No significant change from previous EKG               Objective:     Visit Vitals  /80   Pulse 65   Ht 180.3 cm (71\")   Wt 103 kg (227 lb 3.2 oz)   BMI 31.69 kg/m²             Physical Exam  Constitutional:       Appearance: Normal appearance. He is obese.   HENT:      Head: Normocephalic.   Neck:      Vascular: No carotid bruit.   Cardiovascular:      Rate and Rhythm: Normal rate and regular rhythm.      Chest Wall: PMI is not displaced.      Pulses: Normal pulses.           Radial pulses are 2+ on the right " side and 2+ on the left side.        Posterior tibial pulses are 2+ on the right side and 2+ on the left side.      Heart sounds: Normal heart sounds. No murmur heard.    No friction rub. No gallop.   Pulmonary:      Effort: Pulmonary effort is normal.      Breath sounds: Normal breath sounds.   Abdominal:      General: Bowel sounds are normal. There is no distension.      Palpations: Abdomen is soft.   Musculoskeletal:      Right lower leg: No edema.      Left lower leg: No edema.   Skin:     General: Skin is warm and dry.      Capillary Refill: Capillary refill takes less than 2 seconds.   Neurological:      Mental Status: He is alert and oriented to person, place, and time.   Psychiatric:         Mood and Affect: Mood normal.         Behavior: Behavior normal.         Thought Content: Thought content normal.          Lab Review:         Cardiac Procedures:   1. Echocardiogram 11/02/2022:  Left Ventricle Left ventricular systolic function is normal. Left ventricular ejection fraction appears to be 61 - 65%.     Normal left ventricular cavity size and wall thickness noted. All left ventricular wall segments contract normally. Left ventricular diastolic function was normal.   Right Ventricle Normal right ventricular cavity size and systolic function noted.   Left Atrium Normal left atrial size and volume noted.   Right Atrium Normal right atrial cavity size noted.   Aortic Valve The aortic valve is abnormal in structure. The aortic valve exhibits sclerosis. No significant aortic valve regurgitation is present. No hemodynamically significant aortic valve stenosis is present.   Mitral Valve The mitral valve is structurally normal with no significant stenosis present. Trace mitral valve regurgitation is present.   Tricuspid Valve Trace tricuspid valve regurgitation is present. Insufficient TR velocity profile to estimate the right ventricular systolic pressure.   Pulmonic Valve The pulmonic valve is not well  visualized.   Greater Vessels No dilation of the aortic root is present. The inferior vena cava is normally sized. Partial IVC inspiratory collapse of less than 50% noted.   Pericardium There is no evidence of pericardial effusion.      2. Stress echocardiogram 6/30/21:  • Estimated right ventricular systolic pressure from tricuspid regurgitation is normal (<35 mmHg).  • Calculated left ventricular EF = 58% Estimated left ventricular EF was in agreement with the calculated left ventricular EF. Left ventricular systolic function is normal.  • Left ventricular diastolic function was normal.  • Normal stress echo with no significant echocardiographic evidence for myocardial ischemia.  • Post EF=73%.  3. Nuclear stress test 3/18/19:  • Myocardial perfusion imaging indicates a normal myocardial perfusion study with no evidence of ischemia.  • Left ventricular ejection fraction is normal (Calculated EF = 61%).  • Impressions are consistent with a low risk study.  • There is no prior study available for comparison.  • GI artifact is present.     4. Cardiac catheterization 9/28/17:     FINDINGS:     CORONARY ANGIOGRAPHY:   1.  Left main: 0% stenosis.  The vessel bifurcates into a left anterior descending and left circumflex arteries.  2.  Left anterior descending artery: Proximal LAD 0% stenosis.  A large first diagonal branch is seen originating high from the LAD and has 100% proximal occlusion.  The LAD just after the origin of the first diagonal branch has diffuse 80% followed by a 90% stenosis.  The remainder of the mid to distal LAD has 0% stenosis.  3.  Left circumflex artery: 0% ostial stenosis.  A large first obtuse marginal branch is seen originating high from the circumflex artery.  This has 20-30% proximal stenosis.  The mid left circumflex a proximal artery has 0% stenosis.  The distal left circumflex flex artery gives off a moderate-sized second obtuse marginal branch with scattered 10% stenosis.  The distal  left circumflex after the origin of the second obtuse marginal branch and tapers to small vessel appears to terminate in the AV groove.  4.  Right coronary artery: 30-40% proximal stenosis.  The distal right coronary artery has a 50% stenosis.  The vessel bifurcates into a large posterior descending and small posterolateral arteries.  Posterior descending artery has a 30% mid stenosis.  The posterior lateral artery has scattered 10% stenosis.     LEFT VENTRICULAR HEMODYNAMICS:    1.  Left ventricular pressure: 143/18  2.  Aortic pressure: 138/59     LEFT VENTRICULOGRAPHY: Hypokinesis of the mid to distal anterior wall and apex.  The other wall segments appear to be jose juan normally.  Overall left ventricular systolic function appears to be preserved with an estimated ejection fraction of 55%.         Assessment:         Diagnoses and all orders for this visit:    1. Hyperlipidemia, unspecified hyperlipidemia type (Primary)    2. Primary hypertension    3. Coronary artery disease involving native coronary artery of native heart without angina pectoris    4. History of coronary artery stent placement            Plan:       1. CAD with unstable angina: s/p PCI with stent in 2017 to the first diagonal and LAD. Now with symptoms concernign for his anginal equivalent. EKG is pretty stable. I reviewed his cardiac catheterization films with Dr. Dietz. Given his symptoms and coronary anatomy, it was recommended that he proceed with a cardiac catheterization for further evaluation. Continue aspirin, clopidogrel, statin and lisinopril. No beta blocker due to bradycardia in the past.  2. HTN: blood pressure well controlled on current regimen. No changes.  3. HLD: on statin. Goal LDL < 70.    Thank you for allowing me to participate in this patient's care. Please call with any questions or concerns. Mr. Dinh will follow up with Dr. Avila post catheterization.          Your medication list          Accurate as of April 10,  2023 12:04 PM. If you have any questions, ask your nurse or doctor.            CONTINUE taking these medications      Instructions Last Dose Given Next Dose Due   amLODIPine 5 MG tablet  Commonly known as: NORVASC      Take 1 tablet by mouth once daily       aspirin 81 MG EC tablet      Take 1 tablet by mouth Daily.       atorvastatin 40 MG tablet  Commonly known as: LIPITOR      Take 1 tablet by mouth once daily       clopidogrel 75 MG tablet  Commonly known as: PLAVIX      Take 1 tablet by mouth once daily       indomethacin 50 MG capsule  Commonly known as: INDOCIN      Take 1 capsule by mouth 2 (Two) Times a Day With Meals.       lisinopril 40 MG tablet  Commonly known as: PRINIVIL,ZESTRIL      Take 1 tablet by mouth once daily       Loratadine 10 MG capsule      Take  by mouth.       pantoprazole 40 MG EC tablet  Commonly known as: PROTONIX      TAKE ONE TABLET BY MOUTH ONCE DAILY       tamsulosin 0.4 MG capsule 24 hr capsule  Commonly known as: FLOMAX              STOP taking these medications    ferrous sulfate 325 (65 FE) MG tablet                 CAYLA Reyes  04/10/23  12:02 PM EDT

## 2023-04-12 ENCOUNTER — HOSPITAL ENCOUNTER (OUTPATIENT)
Facility: HOSPITAL | Age: 73
Setting detail: HOSPITAL OUTPATIENT SURGERY
Discharge: HOME | End: 2023-04-12
Attending: STUDENT IN AN ORGANIZED HEALTH CARE EDUCATION/TRAINING PROGRAM | Admitting: STUDENT IN AN ORGANIZED HEALTH CARE EDUCATION/TRAINING PROGRAM
Payer: MEDICARE

## 2023-04-12 VITALS
HEART RATE: 49 BPM | BODY MASS INDEX: 32.5 KG/M2 | HEIGHT: 70 IN | OXYGEN SATURATION: 96 % | RESPIRATION RATE: 16 BRPM | SYSTOLIC BLOOD PRESSURE: 128 MMHG | WEIGHT: 227 LBS | DIASTOLIC BLOOD PRESSURE: 71 MMHG | TEMPERATURE: 97.7 F

## 2023-04-12 DIAGNOSIS — I25.10 CORONARY ARTERY DISEASE INVOLVING NATIVE CORONARY ARTERY OF NATIVE HEART WITHOUT ANGINA PECTORIS: ICD-10-CM

## 2023-04-12 PROCEDURE — C1894 INTRO/SHEATH, NON-LASER: HCPCS | Performed by: STUDENT IN AN ORGANIZED HEALTH CARE EDUCATION/TRAINING PROGRAM

## 2023-04-12 PROCEDURE — 25010000002 HEPARIN (PORCINE) PER 1000 UNITS: Performed by: STUDENT IN AN ORGANIZED HEALTH CARE EDUCATION/TRAINING PROGRAM

## 2023-04-12 PROCEDURE — 93458 L HRT ARTERY/VENTRICLE ANGIO: CPT | Performed by: STUDENT IN AN ORGANIZED HEALTH CARE EDUCATION/TRAINING PROGRAM

## 2023-04-12 PROCEDURE — 25010000002 FENTANYL CITRATE (PF) 50 MCG/ML SOLUTION: Performed by: STUDENT IN AN ORGANIZED HEALTH CARE EDUCATION/TRAINING PROGRAM

## 2023-04-12 PROCEDURE — 99152 MOD SED SAME PHYS/QHP 5/>YRS: CPT | Performed by: STUDENT IN AN ORGANIZED HEALTH CARE EDUCATION/TRAINING PROGRAM

## 2023-04-12 PROCEDURE — 25510000001 IOPAMIDOL PER 1 ML: Performed by: STUDENT IN AN ORGANIZED HEALTH CARE EDUCATION/TRAINING PROGRAM

## 2023-04-12 PROCEDURE — 25010000002 MIDAZOLAM PER 1 MG: Performed by: STUDENT IN AN ORGANIZED HEALTH CARE EDUCATION/TRAINING PROGRAM

## 2023-04-12 PROCEDURE — C1769 GUIDE WIRE: HCPCS | Performed by: STUDENT IN AN ORGANIZED HEALTH CARE EDUCATION/TRAINING PROGRAM

## 2023-04-12 RX ORDER — LIDOCAINE HYDROCHLORIDE 20 MG/ML
INJECTION, SOLUTION INFILTRATION; PERINEURAL
Status: DISCONTINUED | OUTPATIENT
Start: 2023-04-12 | End: 2023-04-12 | Stop reason: HOSPADM

## 2023-04-12 RX ORDER — VERAPAMIL HYDROCHLORIDE 2.5 MG/ML
INJECTION, SOLUTION INTRAVENOUS
Status: DISCONTINUED | OUTPATIENT
Start: 2023-04-12 | End: 2023-04-12 | Stop reason: HOSPADM

## 2023-04-12 RX ORDER — SODIUM CHLORIDE 0.9 % (FLUSH) 0.9 %
10 SYRINGE (ML) INJECTION AS NEEDED
Status: DISCONTINUED | OUTPATIENT
Start: 2023-04-12 | End: 2023-04-12 | Stop reason: HOSPADM

## 2023-04-12 RX ORDER — ACETAMINOPHEN 325 MG/1
650 TABLET ORAL EVERY 4 HOURS PRN
Status: DISCONTINUED | OUTPATIENT
Start: 2023-04-12 | End: 2023-04-12 | Stop reason: HOSPADM

## 2023-04-12 RX ORDER — MIDAZOLAM HYDROCHLORIDE 1 MG/ML
INJECTION INTRAMUSCULAR; INTRAVENOUS
Status: DISCONTINUED | OUTPATIENT
Start: 2023-04-12 | End: 2023-04-12 | Stop reason: HOSPADM

## 2023-04-12 RX ORDER — SODIUM CHLORIDE 9 MG/ML
75 INJECTION, SOLUTION INTRAVENOUS CONTINUOUS
Status: DISCONTINUED | OUTPATIENT
Start: 2023-04-12 | End: 2023-04-12 | Stop reason: HOSPADM

## 2023-04-12 RX ORDER — FENTANYL CITRATE 50 UG/ML
INJECTION, SOLUTION INTRAMUSCULAR; INTRAVENOUS
Status: DISCONTINUED | OUTPATIENT
Start: 2023-04-12 | End: 2023-04-12 | Stop reason: HOSPADM

## 2023-04-12 RX ORDER — SODIUM CHLORIDE 9 MG/ML
40 INJECTION, SOLUTION INTRAVENOUS AS NEEDED
Status: DISCONTINUED | OUTPATIENT
Start: 2023-04-12 | End: 2023-04-12 | Stop reason: HOSPADM

## 2023-04-12 RX ORDER — HEPARIN SODIUM 1000 [USP'U]/ML
INJECTION, SOLUTION INTRAVENOUS; SUBCUTANEOUS
Status: DISCONTINUED | OUTPATIENT
Start: 2023-04-12 | End: 2023-04-12 | Stop reason: HOSPADM

## 2023-04-12 RX ORDER — LIDOCAINE HYDROCHLORIDE 10 MG/ML
0.1 INJECTION, SOLUTION EPIDURAL; INFILTRATION; INTRACAUDAL; PERINEURAL ONCE AS NEEDED
Status: DISCONTINUED | OUTPATIENT
Start: 2023-04-12 | End: 2023-04-12 | Stop reason: HOSPADM

## 2023-04-12 RX ORDER — SODIUM CHLORIDE 0.9 % (FLUSH) 0.9 %
10 SYRINGE (ML) INJECTION EVERY 12 HOURS SCHEDULED
Status: DISCONTINUED | OUTPATIENT
Start: 2023-04-12 | End: 2023-04-12 | Stop reason: HOSPADM

## 2023-04-12 RX ADMIN — SODIUM CHLORIDE 75 ML/HR: 9 INJECTION, SOLUTION INTRAVENOUS at 07:24

## 2023-04-12 NOTE — DISCHARGE INSTRUCTIONS
[facility]  [address] [place] [address]    Coronary Angiogram (Radial/Ulnar Approach) After Care    Refer to this sheet in the next few weeks. These instructions provide you with information on caring for yourself after your procedure. Your caregiver may also give you more specific instructions. Your treatment has been planned according to current medical practices, but problems sometimes occur. Call your caregiver if you have any problems or questions after your procedure.    Home Care Instructions:  You may shower the day after the procedure. Remove the bandage (dressing) and gently wash the site with plain soap and water. Gently pat the site dry. You may apply a band aid daily for 2 days if desired.    Do not apply powder or lotion to the site.  Do not submerge the affected site in water for 3 to 5 days or until the site is completely healed.   Do not lift, push or pull anything over 5 pounds for 5 days after your procedure or as directed by your physician.  As a reference, a gallon of milk weighs 8 pounds.   Inspect the site at least twice daily. You may notice some bruising at the site and it may be tender for 1 to 2 weeks.     Increase your fluid intake for the next 2 days.    Keep arm elevated for 24 hours. For the remainder of the day, keep your arm in “Pledge of Allegiance” position when up and about.     You may drive 24 hours after the procedure unless otherwise instructed by your caregiver.  Do not operate machinery or power tools for 24 hours.  A responsible adult should be with you for the first 24 hours after you arrive home. Do not make any important legal decisions or sign legal papers for 24 hours.  Do not drink alcohol for 24 hours.    Metformin or any medications containing Metformin should not be taken for 48 hours after your procedure.      Call Your Doctor if:   You have unusual pain at the radial/ulnar (wrist) site.  You have redness, warmth, swelling, or pain at the  radial/ulnar (wrist) site.  You have drainage (other than a small amount of blood on the dressing).  `You have chills or a fever > 101.  Your arm becomes pale or dark, cool, tingly, or numb.  You develop chest pain, shortness of breath, feel faint or pass out.    You have heavy bleeding from the site, hold pressure on the site for 20 minutes.  If the bleeding stops, apply a fresh bandage and call your cardiologist.  However, if you        continue to have bleeding, call 911 and continue to apply pressure to the site.   You have any symptoms of a stroke.  Remember BE FAST  B-balance. Sudden trouble walking or loss of balance.  E-eyes.  Sudden changes in how you see or a sudden onset of a very bad headache.   F-face. Sudden weakness or loss of feeling of the face or facial droop on one side.   A-arms Sudden weakness or numbness in one arm.  One arm drifts down if they are both held out in front of you. This happens suddenly and usually on one side of the body.   S-speech.  Sudden trouble speaking, slurred speech or trouble understanding what are saying.   T-time  Time to call emergency services.  Write down the symptoms and the time they started.

## 2023-04-12 NOTE — Clinical Note
A 6 fr sheath was  inserted with ultrasound guidance into the right ulnar artery and right ulnar artery.

## 2023-04-12 NOTE — Clinical Note
Prepped: right groin and Right Wrist. Prepped with: Hibiclens. The site was clipped. The patient was draped in a sterile fashion.

## 2023-04-12 NOTE — Clinical Note
Hemostasis started on the right radial artery. R-Band was used in achieving hemostasis. Radial compression device applied to vessel. Hemostasis achieved successfully. Closure device additional comment: TR band with 12 cc of air

## 2023-04-19 ENCOUNTER — OFFICE VISIT (OUTPATIENT)
Dept: CARDIOLOGY | Facility: CLINIC | Age: 73
End: 2023-04-19
Payer: MEDICARE

## 2023-04-19 VITALS
SYSTOLIC BLOOD PRESSURE: 128 MMHG | DIASTOLIC BLOOD PRESSURE: 68 MMHG | HEIGHT: 70 IN | BODY MASS INDEX: 32.75 KG/M2 | HEART RATE: 59 BPM | WEIGHT: 228.8 LBS

## 2023-04-19 DIAGNOSIS — I25.810 CORONARY ARTERY DISEASE INVOLVING CORONARY BYPASS GRAFT OF NATIVE HEART WITHOUT ANGINA PECTORIS: Primary | ICD-10-CM

## 2023-04-19 RX ORDER — NITROGLYCERIN 0.4 MG/1
TABLET SUBLINGUAL
Qty: 25 TABLET | Refills: 0 | Status: SHIPPED | OUTPATIENT
Start: 2023-04-19

## 2023-04-19 NOTE — PROGRESS NOTES
Subjective:     Encounter Date:04/10/2023      Patient ID: Frnatz Dinh is a 72 y.o. male.    Chief Complaint:follow up CAD  This is a 71 y/o man who follows with Dr. Avila and is new to me today. He has a pmhx of hypertension, hyperlipidemia, coronary artery disease s/p lateral myocardial infarction and drug eluting stent placement of his proximal to mid LAD and proximal first diagonal branch.     I recently saw the patient in the office 2 weeks ago and he was having complaints of dyspnea while picking up golf balls and moving things at work. Tasks that were previously easy for him, such as fertilizing his yard were taking all day due to having to stop and rest. 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 This was very similar to what he experienced prior to his heart attack. He had to sit down for about an hour before he felt better. I had Dr. Dietz review his cath films and we felt it was best given his history of CAD and his symptoms that we proceed with a cardiac catheterization.    He underwent a left heart cath with Dr. Maya on 4/12/23. It showed patent proximal LAD and first diagonal stents and otherwise mild CAD. It also showed normal left sided filling pressures. He is here today for a follow up visit. He continues to feel dyspnea on exertion at times. He continues to deny chest pain, palpitations, dizziness or syncope. He denies any swelling in his lower extremities, orthopnea or PND.    Prior history:  Dr. Avila began following the patient in 2017 when he presented with a lateral MI. He was playing golf and developed chest pressure, diaphoresis and nausea. In route to the hospital, he went in to ventricular fibrillation arrest and responded to defibrillation. He was He was brought emergently to the cardiac catheterization laboratory where he was found to have an occlusion of his first diagonal branch and an 80% stenosis of  his mid LAD.  He subsequently underwent drug-eluting stent placement of both.  His initial echocardiogram showed low normal left ventricular systolic function with an ejection fraction of 48% with apical akinesis, and no significant valvular disease.      At follow up in November 2017, he had a repeat echocardiogram that showed normalization of his LV function and wall motion with an EF of 64%. During his follow ups, he had complaints of dyspnea. He was switched from ticagrelor to clopidogrel and was recommended to work on weight loss. At follow up in 2018, he seemed to be doing better.    He then returned in March 2019 for chest heaviness.  He felt it was similar to his MI. He was set up for a stress test and echocardiogram, both of which were unremarkable. Then in follow up in April 2019, he was noted to be bradycardic and metoprolol was stopped.     In 6/2021 he was continuing to complain of dyspnea on exertion routine follow-up with CAYLA Morris.  A stress echocardiogram was ordered at that time and performed on 6/30/2021.  This showed normal left ventricular systolic function wall motion with an EF of 58% and no significant valvular disease.  Stress portion showed no evidence of ischemia.    He then saw Dr. Avila in October 2022 and was still having dyspnea on exertion. An echocardiogram was obtained that again was unremarkable. He was instructed to return in 6 months.   I have reviewed and updated as appropriate allergies, current medications, past family history, past medical history, past surgical history and problem list.    Review of Systems   Constitutional: Positive for malaise/fatigue. Negative for diaphoresis, fever, weight gain and weight loss.   HENT: Negative for congestion, hoarse voice and sore throat.    Eyes: Negative for blurred vision and double vision.   Cardiovascular: Positive for dyspnea on exertion. Negative for chest pain, leg swelling, orthopnea, palpitations and syncope.    Respiratory: Negative for cough, shortness of breath and wheezing.    Gastrointestinal: Negative for abdominal pain, hematemesis, hematochezia, melena, nausea and vomiting.   Genitourinary: Negative for dysuria and hematuria.   Neurological: Negative for dizziness, headaches, light-headedness and numbness.   Psychiatric/Behavioral: Negative for depression. The patient is not nervous/anxious.          Current Outpatient Medications:   •  amLODIPine (NORVASC) 5 MG tablet, Take 1 tablet by mouth once daily, Disp: 90 tablet, Rfl: 3  •  aspirin 81 MG EC tablet, Take 1 tablet by mouth Daily., Disp: , Rfl:   •  atorvastatin (LIPITOR) 40 MG tablet, Take 1 tablet by mouth once daily, Disp: 90 tablet, Rfl: 1  •  clopidogrel (PLAVIX) 75 MG tablet, Take 1 tablet by mouth once daily, Disp: 90 tablet, Rfl: 3  •  indomethacin (INDOCIN) 50 MG capsule, Take 1 capsule by mouth 2 (Two) Times a Day With Meals., Disp: , Rfl:   •  lisinopril (PRINIVIL,ZESTRIL) 40 MG tablet, Take 1 tablet by mouth once daily, Disp: 90 tablet, Rfl: 3  •  Loratadine 10 MG capsule, Take  by mouth., Disp: , Rfl:   •  pantoprazole (PROTONIX) 40 MG EC tablet, TAKE ONE TABLET BY MOUTH ONCE DAILY, Disp: 90 tablet, Rfl: 1  •  tamsulosin (FLOMAX) 0.4 MG capsule 24 hr capsule, , Disp: , Rfl:     Past Medical History:   Diagnosis Date   • Acute myocardial infarction     lateral wall   • Bradycardia    • Chest discomfort    • Coronary artery disease    • Hyperlipidemia    • Hypertension    • CASPER (obstructive sleep apnea)    • Ventricular fibrillation        Past Surgical History:   Procedure Laterality Date   • APPENDECTOMY     • CARDIAC CATHETERIZATION N/A 9/28/2017    Procedure: Left Heart Cath;  Surgeon: Brianna Avila MD;  Location: [facility] CATH INVASIVE LOCATION;  Service:    • CARDIAC CATHETERIZATION N/A 9/28/2017    Procedure: Coronary angiography;  Surgeon: Brianna Avila MD;  Location: [facility] CATH INVASIVE LOCATION;  Service:    • CARDIAC CATHETERIZATION  "N/A 9/28/2017    Procedure: Stent PERLITA coronary;  Surgeon: Brianna Avila MD;  Location:  [facility] CATH INVASIVE LOCATION;  Service:    • CARDIAC CATHETERIZATION N/A 9/28/2017    Procedure: Left ventriculography;  Surgeon: Brianna Avila MD;  Location:  [facility] CATH INVASIVE LOCATION;  Service:    • CARDIAC CATHETERIZATION N/A 4/12/2023    Procedure: Left Heart Cath;  Surgeon: Santi Maya MD;  Location:  [facility] CATH INVASIVE LOCATION;  Service: Cardiology;  Laterality: N/A;   • CARDIAC CATHETERIZATION N/A 4/12/2023    Procedure: Coronary angiography;  Surgeon: Santi Maya MD;  Location:  [facility] CATH INVASIVE LOCATION;  Service: Cardiology;  Laterality: N/A;   • CORONARY ANGIOPLASTY     • CORONARY STENT PLACEMENT     • WI RT/LT HEART CATHETERS N/A 9/28/2017    Procedure: Percutaneous Coronary Intervention;  Surgeon: Brianna Avila MD;  Location:  [facility] CATH INVASIVE LOCATION;  Service: Cardiovascular       Family History   Problem Relation Age of Onset   • Heart disease Mother    • Heart attack Mother    • Heart attack Father    • Heart disease Father    • Parkinsonism Father    • No Known Problems Sister    • No Known Problems Maternal Grandmother    • No Known Problems Maternal Grandfather    • No Known Problems Paternal Grandmother    • No Known Problems Paternal Grandfather        Social History     Tobacco Use   • Smoking status: Former     Packs/day: 1.50     Types: Cigarettes   • Smokeless tobacco: Never   • Tobacco comments:     \"over 5 years ago\"    Substance Use Topics   • Alcohol use: Yes     Alcohol/week: 2.0 standard drinks     Types: 1 Cans of beer, 1 Shots of liquor per week     Comment: weekly   • Drug use: No         ECG 12 Lead    Date/Time: 4/19/2023 3:30 PM  Performed by: Denise Faria APRN  Authorized by: Denise Faria APRN   Comparison: compared with previous ECG from 4/10/2023  Similar to previous ECG  Rhythm: sinus rhythm  Q waves: II, III and aVF    Comments: No significant change from " "previous EKG               Objective:     Visit Vitals  /68   Pulse 59   Ht 177.8 cm (70\")   Wt 104 kg (228 lb 12.8 oz)   BMI 32.83 kg/m²             Physical Exam  Constitutional:       Appearance: Normal appearance. He is obese.   HENT:      Head: Normocephalic.   Neck:      Vascular: No carotid bruit.   Cardiovascular:      Rate and Rhythm: Normal rate and regular rhythm.      Chest Wall: PMI is not displaced.      Pulses: Normal pulses.           Radial pulses are 2+ on the right side and 2+ on the left side.        Posterior tibial pulses are 2+ on the right side and 2+ on the left side.      Heart sounds: Normal heart sounds. No murmur heard.    No friction rub. No gallop.   Pulmonary:      Effort: Pulmonary effort is normal.      Breath sounds: Normal breath sounds.   Abdominal:      General: Bowel sounds are normal. There is no distension.      Palpations: Abdomen is soft.   Musculoskeletal:      Right lower leg: No edema.      Left lower leg: No edema.      Comments: Right radial cath site clean, soft with no palpable hematoma   Skin:     General: Skin is warm and dry.      Capillary Refill: Capillary refill takes less than 2 seconds.   Neurological:      Mental Status: He is alert and oriented to person, place, and time.   Psychiatric:         Mood and Affect: Mood normal.         Behavior: Behavior normal.         Thought Content: Thought content normal.          Lab Review:         Cardiac Procedures:   1. Echocardiogram 11/02/2022:  Left Ventricle Left ventricular systolic function is normal. Left ventricular ejection fraction appears to be 61 - 65%.     Normal left ventricular cavity size and wall thickness noted. All left ventricular wall segments contract normally. Left ventricular diastolic function was normal.   Right Ventricle Normal right ventricular cavity size and systolic function noted.   Left Atrium Normal left atrial size and volume noted.   Right Atrium Normal right atrial cavity " size noted.   Aortic Valve The aortic valve is abnormal in structure. The aortic valve exhibits sclerosis. No significant aortic valve regurgitation is present. No hemodynamically significant aortic valve stenosis is present.   Mitral Valve The mitral valve is structurally normal with no significant stenosis present. Trace mitral valve regurgitation is present.   Tricuspid Valve Trace tricuspid valve regurgitation is present. Insufficient TR velocity profile to estimate the right ventricular systolic pressure.   Pulmonic Valve The pulmonic valve is not well visualized.   Greater Vessels No dilation of the aortic root is present. The inferior vena cava is normally sized. Partial IVC inspiratory collapse of less than 50% noted.   Pericardium There is no evidence of pericardial effusion.      2. Stress echocardiogram 6/30/21:  • Estimated right ventricular systolic pressure from tricuspid regurgitation is normal (<35 mmHg).  • Calculated left ventricular EF = 58% Estimated left ventricular EF was in agreement with the calculated left ventricular EF. Left ventricular systolic function is normal.  • Left ventricular diastolic function was normal.  • Normal stress echo with no significant echocardiographic evidence for myocardial ischemia.  • Post EF=73%.  3. Nuclear stress test 3/18/19:  • Myocardial perfusion imaging indicates a normal myocardial perfusion study with no evidence of ischemia.  • Left ventricular ejection fraction is normal (Calculated EF = 61%).  • Impressions are consistent with a low risk study.  • There is no prior study available for comparison.  • GI artifact is present.     4. Cardiac catheterization 9/28/17:     FINDINGS:     CORONARY ANGIOGRAPHY:   1.  Left main: 0% stenosis.  The vessel bifurcates into a left anterior descending and left circumflex arteries.  2.  Left anterior descending artery: Proximal LAD 0% stenosis.  A large first diagonal branch is seen originating high from the LAD and  has 100% proximal occlusion.  The LAD just after the origin of the first diagonal branch has diffuse 80% followed by a 90% stenosis.  The remainder of the mid to distal LAD has 0% stenosis.  3.  Left circumflex artery: 0% ostial stenosis.  A large first obtuse marginal branch is seen originating high from the circumflex artery.  This has 20-30% proximal stenosis.  The mid left circumflex a proximal artery has 0% stenosis.  The distal left circumflex flex artery gives off a moderate-sized second obtuse marginal branch with scattered 10% stenosis.  The distal left circumflex after the origin of the second obtuse marginal branch and tapers to small vessel appears to terminate in the AV groove.  4.  Right coronary artery: 30-40% proximal stenosis.  The distal right coronary artery has a 50% stenosis.  The vessel bifurcates into a large posterior descending and small posterolateral arteries.  Posterior descending artery has a 30% mid stenosis.  The posterior lateral artery has scattered 10% stenosis.     LEFT VENTRICULAR HEMODYNAMICS:    1.  Left ventricular pressure: 143/18  2.  Aortic pressure: 138/59     LEFT VENTRICULOGRAPHY: Hypokinesis of the mid to distal anterior wall and apex.  The other wall segments appear to be jose juan normally.  Overall left ventricular systolic function appears to be preserved with an estimated ejection fraction of 55%.         Assessment:         There are no diagnoses linked to this encounter.        Plan:       1. CAD with unstable angina: s/p PCI with stent in 2017 to the first diagonal and LAD. Catheterization on 4/12 showed patent stents and mild CAD. Continue aspirin, clopidogrel, statin and lisinopril. No beta blocker due to bradycardia in the past.   2. Dyspnea on exertion: Does not appear to be cardiac related given stable cardiac cath findings and a recent echocardiogram in November that was unremarkable. Discussed pulmonology consult vs waiting to see PCP. He has an  appointment with his PCP next month and will await follow up with him  2. HTN: blood pressure well controlled on current regimen. No changes.  3. HLD: on statin. Goal LDL < 70.    Thank you for allowing me to participate in this patient's care. Please call with any questions or concerns. Mr. Dinh will follow up with Dr. Avila in 6 months.          Your medication list          Accurate as of April 19, 2023  2:43 PM. If you have any questions, ask your nurse or doctor.            CONTINUE taking these medications      Instructions Last Dose Given Next Dose Due   amLODIPine 5 MG tablet  Commonly known as: NORVASC      Take 1 tablet by mouth once daily       aspirin 81 MG EC tablet      Take 1 tablet by mouth Daily.       atorvastatin 40 MG tablet  Commonly known as: LIPITOR      Take 1 tablet by mouth once daily       clopidogrel 75 MG tablet  Commonly known as: PLAVIX      Take 1 tablet by mouth once daily       indomethacin 50 MG capsule  Commonly known as: INDOCIN      Take 1 capsule by mouth 2 (Two) Times a Day With Meals.       lisinopril 40 MG tablet  Commonly known as: PRINIVIL,ZESTRIL      Take 1 tablet by mouth once daily       Loratadine 10 MG capsule      Take  by mouth.       pantoprazole 40 MG EC tablet  Commonly known as: PROTONIX      TAKE ONE TABLET BY MOUTH ONCE DAILY       tamsulosin 0.4 MG capsule 24 hr capsule  Commonly known as: FLOMAX                    CAYLA Reyes  04/19/23  12:02 PM EDT

## 2023-05-04 NOTE — PROGRESS NOTES
See scanned session report.    ,[email or web address],[email or web address] ,[email or web address]_Unknown,[email or web address]

## 2023-09-25 RX ORDER — ATORVASTATIN CALCIUM 40 MG/1
TABLET, FILM COATED ORAL
Qty: 90 TABLET | Refills: 0 | Status: SHIPPED | OUTPATIENT
Start: 2023-09-25

## 2023-10-19 NOTE — PROGRESS NOTES
Subjective:     Encounter Date:10/20/2023      Patient ID: Frantz Dinh is a 73 y.o. male.    Chief Complaint:  History of Present Illness    This is a 73-year-old with hypertension, hyperlipidemia, coronary artery disease status post lateral myocardial infarction and drug eluting stent placement of his proximal to mid LAD and proximal first diagonal branch, who presents for follow-up.     I saw the patient last in 10/2022 at which time he did report some worsening dyspnea on exertion.  He reported that it was taking less activity for him to get out of breath.  An echocardiogram was recommended and performed on 11/2/2022.  This showed normal left ventricular systolic function and wall motion with an EF of 60 to 65%, normal diastolic function and no significant valvular disease.    He returned to the office in 4/2023 at which time he reported that he felt like his breathing was getting worse.  He also reported episodes of midsternal chest pain that would occur at rest and resolved on its own.  However he also reported episodes with activity.  Symptoms were concerning for unstable angina.  It was recommended that he get scheduled for a cardiac catheterization.  This was performed on 4/12/2023.  This showed largely mild nonobstructive disease and patent stents.  He returned to the office and was seen by CAYLA Reyes on 4/19/2023 at which time he denied any new complaints.  She recommended considering a pulmonary consult at that time.    He continues to have issues with dyspnea on exertion which is a little bit worse per the patient's report.  He feels like he is always on the go and still plays golf a few times a week.  He admits that he does not walk the entire course but will try to park his golf cart elevated further from a total so he can get a little walking in.  He denies any significant chest pain.  He denies any palpitations, orthopnea, near-syncope or syncope or lower extremity swelling.     Prior  History:  I began following the patient when he presented on 9/28/2017 with findings of a lateral myocardial infarction.  Patient was out playing golf and while at the 16th hole began developing chest pressure since he developed diaphoresis and nausea.  EKG performed in the field showed evidence of lateral ST elevations.  In route the patient did suffer a ventricular fibrillation arrest that did respond to defibrillation.  He was brought emergently to the cardiac catheterization laboratory where he was found to have an occlusion of his first diagonal branch and an 80% stenosis of his mid LAD.  He subsequently underwent drug-eluting stent placement of both.  His initial echocardiogram showed low normal left ventricular systolic function with an ejection fraction of 48% with apical akinesis, and no significant valvular disease.       In follow up he had a repeat echocardiogram performed in 11/2017 that showed normalization of his left ventricular systolic function and wall motion with an estimated ejection fraction of 64%, and no significant valvular disease.  Since then during his last couple of follow ups he has complained of dyspnea on exertion that on his recent follow up he felt like was worsening.  He also admitted to weight gain over the last year.  During his last office visit I decided to try switching his ticagrelor to clopiodgrel to see if this would help his symptoms.  I also recommended that he work on weight loss.  When he returned for his last follow up in 12/2018 he denied any further worsening of his shortness of breath.  His blood pressures were elevated but he admitted to increased stress so we decided to monitor his symptoms.      In 3/2019 he presented for urgent evaluation of chest heaviness.  He was replacing his old washer and dryer and doing a lot of the moving himself.  He reports that when he sat down after finishing his work he noticed severe chest heaviness across his chest.  He reported  that it felt similar and may be even a little worse than what he had at the time of his MI.  This was associated with shortness of breath.  He took his blood pressure around that time was noted that it was elevated.  Following that office visit I set the patient up for a stress test and an echocardiogram.  Stress test was performed on 3/18/2019 and showed no evidence of ischemia.  Echocardiogram performed on the same day showed normal left ventricular systolic function and wall motion with an EF of 66% and no significant valvular disease.     In 4/2019 he was noted to be bradycardic on a low dose of metoprolol tartrate.  He was asymptomatic but since he was on such a low dose I opted to discontinue the medication. At a telephone follow up in 4/2020 he reported his blood pressures were elevated off of the metoprolol.  At that time I recommended increasing his lisinopril to 40 mg a day.       In 2/2020 was noted to have elevated blood pressures so amlodipine 5 mg daily was added to his regimen with improvement in his pressures.     In 6/2021 he was continuing to complain of dyspnea on exertion routine follow-up with CAYLA Morris.  A stress echocardiogram was ordered at that time and performed on 6/30/2021.  This showed normal left ventricular systolic function wall motion with an EF of 58% and no significant valvular disease.  Stress portion showed no evidence of ischemia.    Review of Systems   Constitutional: Negative for malaise/fatigue.   HENT:  Negative for hearing loss, hoarse voice, nosebleeds and sore throat.    Eyes:  Negative for pain.   Cardiovascular:  Negative for chest pain, claudication, cyanosis, dyspnea on exertion, irregular heartbeat, leg swelling, near-syncope, orthopnea, palpitations, paroxysmal nocturnal dyspnea and syncope.   Respiratory:  Negative for shortness of breath and snoring.    Endocrine: Negative for cold intolerance, heat intolerance, polydipsia, polyphagia and polyuria.    Skin:  Negative for itching and rash.   Musculoskeletal:  Negative for arthritis, falls, joint pain, joint swelling, muscle cramps, muscle weakness and myalgias.   Gastrointestinal:  Negative for constipation, diarrhea, dysphagia, heartburn, hematemesis, hematochezia, melena, nausea and vomiting.   Genitourinary:  Negative for frequency, hematuria and hesitancy.   Neurological:  Negative for excessive daytime sleepiness, dizziness, headaches, light-headedness, numbness and weakness.   Psychiatric/Behavioral:  Negative for depression. The patient is not nervous/anxious.          Current Outpatient Medications:     amLODIPine (NORVASC) 5 MG tablet, Take 1 tablet by mouth once daily, Disp: 90 tablet, Rfl: 3    aspirin 81 MG EC tablet, Take 1 tablet by mouth Daily., Disp: , Rfl:     atorvastatin (LIPITOR) 40 MG tablet, Take 1 tablet by mouth once daily, Disp: 90 tablet, Rfl: 0    clopidogrel (PLAVIX) 75 MG tablet, Take 1 tablet by mouth once daily, Disp: 90 tablet, Rfl: 3    indomethacin (INDOCIN) 50 MG capsule, Take 1 capsule by mouth 2 (Two) Times a Day With Meals., Disp: , Rfl:     lisinopril (PRINIVIL,ZESTRIL) 40 MG tablet, Take 1 tablet by mouth once daily, Disp: 90 tablet, Rfl: 3    Loratadine 10 MG capsule, Take  by mouth., Disp: , Rfl:     nitroglycerin (NITROSTAT) 0.4 MG SL tablet, 1 under the tongue as needed for angina, may repeat q5mins for up three doses, Disp: 25 tablet, Rfl: 0    pantoprazole (PROTONIX) 40 MG EC tablet, TAKE ONE TABLET BY MOUTH ONCE DAILY, Disp: 90 tablet, Rfl: 1    tamsulosin (FLOMAX) 0.4 MG capsule 24 hr capsule, , Disp: , Rfl:     Past Medical History:   Diagnosis Date    Acute myocardial infarction     lateral wall    Bradycardia     Chest discomfort     Coronary artery disease     Hyperlipidemia     Hypertension     CASPER (obstructive sleep apnea)     Ventricular fibrillation        Past Surgical History:   Procedure Laterality Date    APPENDECTOMY      CARDIAC CATHETERIZATION N/A  "9/28/2017    Procedure: Left Heart Cath;  Surgeon: Brianna Avila MD;  Location:  [facility];  Service:     CARDIAC CATHETERIZATION N/A 9/28/2017    Procedure: Coronary angiography;  Surgeon: Brianna Avila MD;  Location:  [facility]VAS[facility];  Service:     CARDIAC CATHETERIZATION N/A 9/28/2017    Procedure: Stent PERLITA coronary;  Surgeon: Brianna Avila MD;  Location:  [facility] INVASIVE [facility];  Service:     CARDIAC CATHETERIZATION N/A 9/28/2017    Procedure: Left ventriculography;  Surgeon: Brianna Avila MD;  Location:  [facility] INVAS[facility] LOCATION;  Service:     CARDIAC CATHETERIZATION N/A 4/12/2023    Procedure: Left Heart Cath;  Surgeon: Santi Maya MD;  Location:  [facility] INVAS[facility] LOC[facility];  Service: Cardiology;  Laterality: N/A;    CARDIAC CATHETERIZATION N/A 4/12/2023    Procedure: Coronary angiography;  Surgeon: Santi Maya MD;  Location: [facility] CATH [facility];  Service: Cardiology;  Laterality: N/A;    CORONARY ANGIOPLASTY      CORONARY STENT PLACEMENT      VA RT/LT HEART CATHETERS N/A 9/28/2017    Procedure: Percutaneous Coronary Intervention;  Surgeon: Brianna Avila MD;  Location:  [facility] CATH INVASIVE LOCATION;  Service: Cardiovascular       Family History   Problem Relation Age of Onset    Heart disease Mother     Heart attack Mother     Heart attack Father     Heart disease Father     Parkinsonism Father     No Known Problems Sister     No Known Problems Maternal Grandmother     No Known Problems Maternal Grandfather     No Known Problems Paternal Grandmother     No Known Problems Paternal Grandfather        Social History     Tobacco Use    Smoking status: Former     Packs/day: 1.5     Types: Cigarettes    Smokeless tobacco: Never    Tobacco comments:     \"over 5 years ago\"    Substance Use Topics    Alcohol use: Yes     Alcohol/week: 2.0 standard drinks of alcohol     Types: 1 Cans of beer, 1 Shots of liquor per week     Comment: weekly    Drug use: No " "        ECG 12 Lead    Date/Time: 10/20/2023 11:51 AM  Performed by: Brianna Avila MD    Authorized by: Brianna Avila MD  Comparison: compared with previous ECG   Similar to previous ECG  Rhythm: sinus rhythm             Objective:     Visit Vitals  /90 (BP Location: Left arm, Patient Position: Sitting, Cuff Size: Adult)   Pulse 60   Ht 177.8 cm (70\")   Wt 102 kg (225 lb)   SpO2 96%   BMI 32.28 kg/m²         Constitutional:       Appearance: Normal appearance. Well-developed.   HENT:      Head: Normocephalic and atraumatic.   Neck:      Vascular: No carotid bruit or JVD.   Pulmonary:      Effort: Pulmonary effort is normal.      Breath sounds: Normal breath sounds.   Cardiovascular:      Normal rate. Regular rhythm.      No gallop.    Pulses:     Radial: 2+ bilaterally.  Edema:     Peripheral edema absent.   Abdominal:      Palpations: Abdomen is soft.   Skin:     General: Skin is warm and dry.   Neurological:      Mental Status: Alert and oriented to person, place, and time.             Assessment:          Diagnosis Plan   1. Coronary artery disease involving native coronary artery of native heart without angina pectoris        2. Hyperlipidemia, unspecified hyperlipidemia type        3. Primary hypertension        4. History of coronary artery stent placement        5. Obstructive sleep apnea               Plan:       1.  Coronary artery disease.  Cardiac catheterization earlier this year and showed stable nonobstructive disease and patent stents.  Denies any other significant episodes of chest pain.  His EKG remains normal.  Still with dyspnea on exertion which I suspect may be more due to deconditioning.  I encouraged him to work on exercise.  2.  Hypertension.  Borderline elevated the office today but he reports is normally well controlled at home.  Continue current management and monitor blood pressures.  3.  Hyperlipidemia.  On atorvastatin for goal LDL of 70 or below.  Last lipid panel showed " that his LDL was at goal.  Continue current management.  4.  Obstructive sleep apnea.    We will plan on seeing the patient back again in 6 months.

## 2023-10-20 ENCOUNTER — OFFICE VISIT (OUTPATIENT)
Age: 73
End: 2023-10-20
Payer: MEDICARE

## 2023-10-20 VITALS
OXYGEN SATURATION: 96 % | HEIGHT: 70 IN | BODY MASS INDEX: 32.21 KG/M2 | SYSTOLIC BLOOD PRESSURE: 140 MMHG | WEIGHT: 225 LBS | DIASTOLIC BLOOD PRESSURE: 90 MMHG | HEART RATE: 60 BPM

## 2023-10-20 DIAGNOSIS — Z95.5 HISTORY OF CORONARY ARTERY STENT PLACEMENT: ICD-10-CM

## 2023-10-20 DIAGNOSIS — G47.33 OBSTRUCTIVE SLEEP APNEA: ICD-10-CM

## 2023-10-20 DIAGNOSIS — E78.5 HYPERLIPIDEMIA, UNSPECIFIED HYPERLIPIDEMIA TYPE: ICD-10-CM

## 2023-10-20 DIAGNOSIS — I10 PRIMARY HYPERTENSION: ICD-10-CM

## 2023-10-20 DIAGNOSIS — I25.10 CORONARY ARTERY DISEASE INVOLVING NATIVE CORONARY ARTERY OF NATIVE HEART WITHOUT ANGINA PECTORIS: Primary | ICD-10-CM

## 2023-10-20 PROCEDURE — 93000 ELECTROCARDIOGRAM COMPLETE: CPT | Performed by: INTERNAL MEDICINE

## 2023-10-20 PROCEDURE — 1160F RVW MEDS BY RX/DR IN RCRD: CPT | Performed by: INTERNAL MEDICINE

## 2023-10-20 PROCEDURE — 99214 OFFICE O/P EST MOD 30 MIN: CPT | Performed by: INTERNAL MEDICINE

## 2023-10-20 PROCEDURE — 3080F DIAST BP >= 90 MM HG: CPT | Performed by: INTERNAL MEDICINE

## 2023-10-20 PROCEDURE — 1159F MED LIST DOCD IN RCRD: CPT | Performed by: INTERNAL MEDICINE

## 2023-10-20 PROCEDURE — 3077F SYST BP >= 140 MM HG: CPT | Performed by: INTERNAL MEDICINE

## 2023-10-20 RX ORDER — ATORVASTATIN CALCIUM 40 MG/1
40 TABLET, FILM COATED ORAL DAILY
Qty: 90 TABLET | Refills: 3 | Status: SHIPPED | OUTPATIENT
Start: 2023-10-20

## 2023-10-20 RX ORDER — AMLODIPINE BESYLATE 5 MG/1
5 TABLET ORAL DAILY
Qty: 90 TABLET | Refills: 3 | Status: SHIPPED | OUTPATIENT
Start: 2023-10-20

## 2023-10-20 RX ORDER — CLOPIDOGREL BISULFATE 75 MG/1
75 TABLET ORAL DAILY
Qty: 90 TABLET | Refills: 3 | Status: SHIPPED | OUTPATIENT
Start: 2023-10-20

## 2023-10-25 RX ORDER — LISINOPRIL 40 MG/1
TABLET ORAL
Qty: 90 TABLET | Refills: 3 | Status: SHIPPED | OUTPATIENT
Start: 2023-10-25

## 2024-04-26 ENCOUNTER — OFFICE VISIT (OUTPATIENT)
Dept: CARDIOLOGY | Facility: CLINIC | Age: 74
End: 2024-04-26
Payer: MEDICARE

## 2024-04-26 VITALS
WEIGHT: 234 LBS | DIASTOLIC BLOOD PRESSURE: 76 MMHG | BODY MASS INDEX: 33.5 KG/M2 | HEART RATE: 70 BPM | HEIGHT: 70 IN | SYSTOLIC BLOOD PRESSURE: 138 MMHG | OXYGEN SATURATION: 96 %

## 2024-04-26 DIAGNOSIS — G47.33 OBSTRUCTIVE SLEEP APNEA: ICD-10-CM

## 2024-04-26 DIAGNOSIS — E78.2 MIXED HYPERLIPIDEMIA: ICD-10-CM

## 2024-04-26 DIAGNOSIS — I10 PRIMARY HYPERTENSION: ICD-10-CM

## 2024-04-26 DIAGNOSIS — Z95.5 HISTORY OF CORONARY ARTERY STENT PLACEMENT: ICD-10-CM

## 2024-04-26 DIAGNOSIS — I25.10 CORONARY ARTERY DISEASE INVOLVING NATIVE CORONARY ARTERY OF NATIVE HEART WITHOUT ANGINA PECTORIS: Primary | ICD-10-CM

## 2024-04-26 PROCEDURE — 3078F DIAST BP <80 MM HG: CPT | Performed by: NURSE PRACTITIONER

## 2024-04-26 PROCEDURE — 99214 OFFICE O/P EST MOD 30 MIN: CPT | Performed by: NURSE PRACTITIONER

## 2024-04-26 PROCEDURE — 93000 ELECTROCARDIOGRAM COMPLETE: CPT | Performed by: NURSE PRACTITIONER

## 2024-04-26 PROCEDURE — 1160F RVW MEDS BY RX/DR IN RCRD: CPT | Performed by: NURSE PRACTITIONER

## 2024-04-26 PROCEDURE — 3075F SYST BP GE 130 - 139MM HG: CPT | Performed by: NURSE PRACTITIONER

## 2024-04-26 PROCEDURE — 1159F MED LIST DOCD IN RCRD: CPT | Performed by: NURSE PRACTITIONER

## 2024-04-26 NOTE — PROGRESS NOTES
Date of Office Visit: 2024  Encounter Provider: CAYLA Gotti  Place of Service: [facility] CARDIOLOGY  Patient Name: Frantz Dnih  :1950    Chief Complaint   Patient presents with    Follow-up    Coronary Artery Disease   :     HPI: Frantz Dinh is a 73 y.o. male who is a patient of Dr. Avila.  Has a history of coronary disease and hyperlipidemia as well as hypertension.  History of drug-eluting stent to the mid LAD and proximal first diagonal branch.  He was last seen in the office in October.  Echocardiogram performed a year before was stable.  Sometimes he got a little bit out of breath.  Has some baseline dyspnea but this was unchanged from previous.  He is here for follow-up today.    He comes in today for follow-up he denies any symptoms of angina.  He does get short of breath if he overexerts himself.  He has not had lab work done in a year but last lipid panel done in May 2023 was at goal.  Previous testing and notes have been reviewed by me.   Past Medical History:   Diagnosis Date    Acute myocardial infarction     lateral wall    Bradycardia     Chest discomfort     Coronary artery disease     Hyperlipidemia     Hypertension     CASPER (obstructive sleep apnea)     Ventricular fibrillation        Past Surgical History:   Procedure Laterality Date    APPENDECTOMY      CARDIAC CATHETERIZATION N/A 2017    Procedure: Left Heart Cath;  Surgeon: Brianna Avila MD;  Location: [facility] INVASIVE LOCATION;  Service:     CARDIAC CATHETERIZATION N/A 2017    Procedure: Coronary angiography;  Surgeon: Brianna Avila MD;  Location: [facility] INVASIVE LOCATION;  Service:     CARDIAC CATHETERIZATION N/A 2017    Procedure: Stent PERLITA coronary;  Surgeon: Brianna Avila MD;  Location: [facility] CATH INVASIVE LOCATION;  Service:     CARDIAC CATHETERIZATION N/A 2017    Procedure: Left ventriculography;  Surgeon: Brianna Avila MD;  Location: [facility]  "CATH INVASIVE LOCATION;  Service:     CARDIAC CATHETERIZATION N/A 4/12/2023    Procedure: Left Heart Cath;  Surgeon: Santi Maya MD;  [facility];  Service: Cardiology;  Laterality: N/A;    CARDIAC CATHETERIZATION N/A 4/12/2023    Procedure: Coronary angiography;  Surgeon: Santi Maya MD;  Location:  [facility] CATH INVASIVE LOCATION;  Service: Cardiology;  Laterality: N/A;    CORONARY ANGIOPLASTY      CORONARY STENT PLACEMENT      ID RT/LT HEART CATHETERS N/A 9/28/2017    Procedure: Percutaneous Coronary Intervention;  Surgeon: Brianna Avila MD;  Location:  [facility] CATH INVASIVE LOCATION;  Service: Cardiovascular       Social History     Socioeconomic History    Marital status:    Tobacco Use    Smoking status: Former     Current packs/day: 1.50     Types: Cigarettes    Smokeless tobacco: Never    Tobacco comments:     \"over 5 years ago\"    Substance and Sexual Activity    Alcohol use: Yes     Alcohol/week: 2.0 standard drinks of alcohol     Types: 1 Cans of beer, 1 Shots of liquor per week     Comment: weekly    Drug use: No    Sexual activity: Defer       Family History   Problem Relation Age of Onset    Heart disease Mother     Heart attack Mother     Heart attack Father     Heart disease Father     Parkinsonism Father     No Known Problems Sister     No Known Problems Maternal Grandmother     No Known Problems Maternal Grandfather     No Known Problems Paternal Grandmother     No Known Problems Paternal Grandfather        Review of Systems   Constitutional: Negative for diaphoresis and malaise/fatigue.   Cardiovascular:  Negative for chest pain, claudication, dyspnea on exertion, irregular heartbeat, leg swelling, near-syncope, orthopnea, palpitations, paroxysmal nocturnal dyspnea and syncope.   Respiratory:  Negative for cough, shortness of breath and sleep disturbances due to breathing.    Musculoskeletal:  Negative for falls.   Neurological:  Negative for dizziness and weakness. " "  Psychiatric/Behavioral:  Negative for altered mental status and substance abuse.        No Known Allergies      Current Outpatient Medications:     amLODIPine (NORVASC) 5 MG tablet, Take 1 tablet by mouth Daily., Disp: 90 tablet, Rfl: 3    aspirin 81 MG EC tablet, Take 1 tablet by mouth Daily., Disp: , Rfl:     atorvastatin (LIPITOR) 40 MG tablet, Take 1 tablet by mouth Daily., Disp: 90 tablet, Rfl: 3    clopidogrel (PLAVIX) 75 MG tablet, Take 1 tablet by mouth Daily., Disp: 90 tablet, Rfl: 3    indomethacin (INDOCIN) 50 MG capsule, Take 1 capsule by mouth 2 (Two) Times a Day With Meals., Disp: , Rfl:     lisinopril (PRINIVIL,ZESTRIL) 40 MG tablet, Take 1 tablet by mouth once daily, Disp: 90 tablet, Rfl: 3    Loratadine 10 MG capsule, Take  by mouth., Disp: , Rfl:     nitroglycerin (NITROSTAT) 0.4 MG SL tablet, 1 under the tongue as needed for angina, may repeat q5mins for up three doses, Disp: 25 tablet, Rfl: 0    pantoprazole (PROTONIX) 40 MG EC tablet, TAKE ONE TABLET BY MOUTH ONCE DAILY, Disp: 90 tablet, Rfl: 1    tamsulosin (FLOMAX) 0.4 MG capsule 24 hr capsule, , Disp: , Rfl:       Objective:     Vitals:    04/26/24 1003   BP: 138/76   Pulse: 70   SpO2: 96%   Weight: 106 kg (234 lb)   Height: 177.8 cm (70\")     Body mass index is 33.58 kg/m².    PHYSICAL EXAM:    Constitutional:       General: Not in acute distress.     Appearance: Normal appearance. Well-developed.   Eyes:      Pupils: Pupils are equal, round, and reactive to light.   HENT:      Head: Normocephalic.   Neck:      Vascular: No carotid bruit or JVD.   Pulmonary:      Effort: Pulmonary effort is normal. No tachypnea.      Breath sounds: Normal breath sounds. No wheezing. No rales.   Cardiovascular:      Normal rate. Regular rhythm.      No gallop.    Pulses:     Intact distal pulses.   Edema:     Peripheral edema absent.   Abdominal:      General: Bowel sounds are normal.      Palpations: Abdomen is soft.      Tenderness: There is no abdominal " tenderness.   Musculoskeletal: Normal range of motion.      Cervical back: Normal range of motion and neck supple. No edema. Skin:     General: Skin is warm and dry.   Neurological:      Mental Status: Alert and oriented to person, place, and time.         ECG 12 Lead    Date/Time: 4/26/2024 10:38 AM  Performed by: Daphne Doll APRN    Authorized by: Daphne Doll APRN  Comparison: compared with previous ECG from 10/20/2023  Similar to previous ECG  Rhythm: sinus rhythm  Ectopy: infrequent PVCs  Rate: normal  QRS axis: normal    Clinical impression: normal ECG          Assessment:       Diagnosis Plan   1. Coronary artery disease involving native coronary artery of native heart without angina pectoris     On aspirin, statin.  No angina symptoms encouraged exercise   2. History of coronary artery stent placement     On aspirin and Plavix   3. Mixed hyperlipidemia     Plan for repeat lipid panel next week with primary care last lipids at goal   4. Primary hypertension     Has been mildly elevated   5. Obstructive sleep apnea     Patient snores at night and with mild elevation of blood pressure recommend sleep apnea evaluation.     No orders of the defined types were placed in this encounter.         Plan:       Follow up in 6 months          Your medication list            Accurate as of April 26, 2024 10:13 AM. If you have any questions, ask your nurse or doctor.                CONTINUE taking these medications        Instructions Last Dose Given Next Dose Due   amLODIPine 5 MG tablet  Commonly known as: NORVASC      Take 1 tablet by mouth Daily.       aspirin 81 MG EC tablet      Take 1 tablet by mouth Daily.       atorvastatin 40 MG tablet  Commonly known as: LIPITOR      Take 1 tablet by mouth Daily.       clopidogrel 75 MG tablet  Commonly known as: PLAVIX      Take 1 tablet by mouth Daily.       indomethacin 50 MG capsule  Commonly known as: INDOCIN      Take 1 capsule by mouth 2 (Two) Times a Day  With Meals.       lisinopril 40 MG tablet  Commonly known as: PRINIVIL,ZESTRIL      Take 1 tablet by mouth once daily       Loratadine 10 MG capsule      Take  by mouth.       nitroglycerin 0.4 MG SL tablet  Commonly known as: NITROSTAT      1 under the tongue as needed for angina, may repeat q5mins for up three doses       pantoprazole 40 MG EC tablet  Commonly known as: PROTONIX      TAKE ONE TABLET BY MOUTH ONCE DAILY       tamsulosin 0.4 MG capsule 24 hr capsule  Commonly known as: FLOMAX                      As always, it has been a pleasure to participate in your patient's care.      Sincerely,     Daphne KULKARNI

## 2024-05-06 ENCOUNTER — HOSPITAL ENCOUNTER (OUTPATIENT)
Dept: SLEEP MEDICINE | Facility: HOSPITAL | Age: 74
Discharge: HOME | End: 2024-05-06
Admitting: NURSE PRACTITIONER
Payer: MEDICARE

## 2024-05-06 DIAGNOSIS — G47.33 OBSTRUCTIVE SLEEP APNEA: ICD-10-CM

## 2024-05-06 PROCEDURE — 95806 SLEEP STUDY UNATT&RESP EFFT: CPT

## 2024-05-20 ENCOUNTER — TELEPHONE (OUTPATIENT)
Dept: SLEEP MEDICINE | Facility: HOSPITAL | Age: 74
End: 2024-05-20
Payer: MEDICARE

## 2024-05-24 ENCOUNTER — TELEPHONE (OUTPATIENT)
Dept: CARDIOLOGY | Facility: CLINIC | Age: 74
End: 2024-05-24
Payer: MEDICARE

## 2024-05-24 NOTE — TELEPHONE ENCOUNTER
Dr. Hankins from [facility] is requesting clearance for the pt to have a prostate biopsy on 6/13/24.  They are requesting the pt to be off plavix and aspirin for 5 days prior to the procedure.    Clearance can be faxed to [phone]    Please advise.

## 2024-06-03 NOTE — TELEPHONE ENCOUNTER
Dictated letter sent to Dr. Hankins in [facility] and a paper copy was faxed to the number provided.

## 2024-06-04 ENCOUNTER — OFFICE VISIT (OUTPATIENT)
Dept: SLEEP MEDICINE | Facility: HOSPITAL | Age: 74
End: 2024-06-04
Payer: MEDICARE

## 2024-06-04 VITALS — BODY MASS INDEX: 33.07 KG/M2 | OXYGEN SATURATION: 97 % | HEIGHT: 70 IN | WEIGHT: 231 LBS | HEART RATE: 65 BPM

## 2024-06-04 DIAGNOSIS — G47.33 OBSTRUCTIVE SLEEP APNEA: Primary | ICD-10-CM

## 2024-06-04 DIAGNOSIS — G47.36 HYPOXEMIA ASSOCIATED WITH SLEEP: ICD-10-CM

## 2024-06-04 PROCEDURE — 99204 OFFICE O/P NEW MOD 45 MIN: CPT | Performed by: INTERNAL MEDICINE

## 2024-06-04 PROCEDURE — 1159F MED LIST DOCD IN RCRD: CPT | Performed by: INTERNAL MEDICINE

## 2024-06-04 PROCEDURE — G0463 HOSPITAL OUTPT CLINIC VISIT: HCPCS

## 2024-06-04 PROCEDURE — 1160F RVW MEDS BY RX/DR IN RCRD: CPT | Performed by: INTERNAL MEDICINE

## 2024-06-04 NOTE — PROGRESS NOTES
[facility] New [facility]ultation       Reason for Consultation: CASPER    Patient Care Team:  Solis Ballesteros MD as PCP - General (Internal Medicine)  Shaq Reyes MD as Consulting Physician (Sleep Medicine)    Chief complaint: CASPER    History of present illness:    Thank you for asking me to see your patient.  The patient is a 73 y.o. male who is normally followed by Dr. Avila.  He has a history of coronary disease and hyperlipidemia as well as hypertension.  History of drug-eluting stent to the mid LAD and proximal first diagonal branch.  Echocardiogram performed a year before was stable.  Sometimes he gets a little bit out of breath.  Has some baseline dyspnea but this was unchanged from previous visit.       He was recently seen by cardiology in April and he denied any symptoms of angina.  He does get short of breath if he overexerts himself.        The patient snores at night and with mild elevation of blood pressure and cardiology recommended a home sleep study.  The patient reports he did undergo sleep evaluation at [facility] several years ago.  He states that they reported that he slept like a baby?  Recently, his wife was diagnosed with obstructive sleep apnea and used CPAP for less than a week because it was way too loud?     The patient goes to bed 11 PM and gets out of bed at 7 AM.  He states he is rested upon arising.  However, he will take 1 or 2 naps daily.  He is retired.  Murdock Sleepiness Scale is abnormal at 7-10.  Witnessed apnea previously noted.     Review of Systems:    A complete review of systems was done and all were negative with the exception of shortness of breath with exertion    History:  Past Medical History:   Diagnosis Date    Acute myocardial infarction     lateral wall    Bradycardia     Chest discomfort     Coronary artery disease     Hyperlipidemia     Hypertension     Hypoxemia associated with sleep     CASPER (obstructive sleep apnea)  05/06/2024    Home sleep study.  Weight 234 pounds.  Severe CASPER with AHI 40.5 events per hour.  When supine 47.3 events per hour.  On his left side, mildly abnormal 6.6 events per hour.  Low oxygen saturation 70% and sleep-related hypoxia present for 85.9 minutes.  The patient snored 42% of total monitoring time.    Ventricular fibrillation    ,   Past Surgical History:   Procedure Laterality Date    APPENDECTOMY      CARDIAC CATHETERIZATION N/A 9/28/2017    Procedure: Left Heart Cath;  Surgeon: Brianna Avila MD;  Location[facility]ASIVE LOC[facility];  Service:     CARDIAC CATHETERIZATION N/A 9/28/2017    Procedure: Coronary angiography;  Surgeon: Brianna Avila MD;  [facility];  Service:     CARDIAC CATHETERIZATION N/A 9/28/2017    Procedure: Stent PELRITA coronary;  Surgeon: Brianna Avila MD;  [facility];  Service:     CARDIAC CATHETERIZATION N/A 9/28/2017    Procedure: Left ventriculography;  Surgeon: Brianna Avila MD;  [facility];  Service:     CARDIAC CATHETERIZATION N/A 4/12/2023    Procedure: Left Heart Cath;  Surgeon: Santi Maya MD;  [facility];  Service: Cardiology;  Laterality: N/A;    CARDIAC CATHETERIZATION N/A 4/12/2023    Procedure: Coronary angiography;  Surgeon: Santi Maya MD;  [facility];  Service: Cardiology;  Laterality: N/A;    CORONARY ANGIOPLASTY      CORONARY STENT PLACEMENT      NM RT/LT HEART CATHETERS N/A 9/28/2017    Procedure: Percutaneous Coronary Intervention;  Surgeon: Brianna Avila MD;  Location: [facility] CATH INVASIVE LOCATION;  Service: Cardiovascular   ,   Family History   Problem Relation Age of Onset    Heart disease Mother     Heart attack Mother     Heart attack Father     Heart disease Father     Parkinsonism Father     No Known Problems Sister     No Known Problems Maternal Grandmother     No Known Problems Maternal  "Grandfather     No Known Problems Paternal Grandmother     No Known Problems Paternal Grandfather    , and   Social History     Socioeconomic History    Marital status:    Tobacco Use    Smoking status: Former     Current packs/day: 1.50     Types: Cigarettes     Passive exposure: Never    Smokeless tobacco: Never    Tobacco comments:     \"over 5 years ago\"    Vaping Use    Vaping status: Never Used   Substance and Sexual Activity    Alcohol use: Yes     Alcohol/week: 2.0 standard drinks of alcohol     Types: 1 Cans of beer, 1 Shots of liquor per week     Comment: 6 weekly    Drug use: No    Sexual activity: Defer     E-cigarette/Vaping    E-cigarette/Vaping Use Never User     Passive Exposure No     Counseling Given No      E-cigarette/Vaping Substances     E-cigarette/Vaping Devices      Social History: 1 soda per day    Allergies:  Patient has no known allergies.     Medication Review: Reviewed    Vital Signs:    Vitals:    06/04/24 0736   Pulse: 65   SpO2: 97%   Weight: 105 kg (231 lb)   Height: 177.8 cm (70\")      Body mass index is 33.15 kg/m².         Physical Exam:    Constitutional:  Well developed 73 y.o. male that appears in no apparent distress.  Awake & oriented times 3.  Normal mood with normal recent and remote memory and normal judgement.  Eyes:  Conjunctivae normal.  Oropharynx: Moist mucous membranes without exudate and a large tongue and normal uvula and moderate-severe narrowing of the posterior pharyngeal opening and class II-3 Mallampati airway.    Neck: Trachea midline  Respiratory: Effort is not labored  Cardiovascular: Radial pulse regular  Musculoskeletal: Gait appears normal, no digital clubbing evident, no pre-tibial edema    Results Review: I reviewed the results of his home sleep study from 5/6/2024    Impression:   Severe obstructive sleep apnea for total monitoring time, mild severity on his left side, with sleep-related hypoxia Home sleep study 5/6/2024, weight 234 pounds.  " The patient does have some complaints of hypersomnolence.    Plan:  Good sleep hygiene measures should be maintained.  Weight loss would be beneficial in this patient who is obese by Body mass index is 33.15 kg/m².    Pathophysiology of CASPER described to the patient.  Cardiovascular complications of untreated CASPER also reviewed.  I also described how untreated severe obstructive sleep apnea could be contributing to some of his daytime dyspnea on exertion.    After reviewing all with the patient, with the recent experience related to his wife's CPAP trial, the patient reports he is not interested in any treatment.  The patient has dentures and therefore an oral appliance could not be considered.  I did discuss hypoglossal nerve stimulation.    The patient will review with Dr. Ballesteros.  I also encouraged him to talk to cardiology.  I tried to answer all of his questions.  The patient will contact me if I can assist in the future.    Thank you for requesting me to assist in this patient's care.    Shaq Reyes MD  Sleep Medicine  06/04/24  07:39 EDT

## 2024-06-08 PROBLEM — G47.36 HYPOXEMIA ASSOCIATED WITH SLEEP: Status: ACTIVE | Noted: 2024-06-08

## 2024-10-10 DIAGNOSIS — I25.10 CORONARY ARTERY DISEASE INVOLVING NATIVE CORONARY ARTERY OF NATIVE HEART WITHOUT ANGINA PECTORIS: ICD-10-CM

## 2024-10-10 RX ORDER — CLOPIDOGREL BISULFATE 75 MG/1
75 TABLET ORAL DAILY
Qty: 90 TABLET | Refills: 0 | Status: SHIPPED | OUTPATIENT
Start: 2024-10-10

## 2024-10-31 NOTE — PROGRESS NOTES
Subjective:     Encounter Date:11/01/2024      Patient ID: Frantz Dinh is a 74 y.o. male.    Chief Complaint:  History of Present Illness    This is a 73-year-old with hypertension, hyperlipidemia, coronary artery disease status post lateral myocardial infarction and drug eluting stent placement of his proximal to mid LAD and proximal first diagonal branch, who presents for follow-up.     In 10/2022 he reported some worsening dyspnea on exertion.  He reported that it was taking less activity for him to get out of breath.  An echocardiogram was recommended and performed on 11/2/2022.  This showed normal left ventricular systolic function and wall motion with an EF of 60 to 65%, normal diastolic function and no significant valvular disease.     He returned to the office in 4/2023 at which time he reported that he felt like his breathing was getting worse.  He also reported episodes of midsternal chest pain that would occur at rest and resolved on its own.  However he also reported episodes with activity.  Symptoms were concerning for unstable angina.  It was recommended that he get scheduled for a cardiac catheterization.  This was performed on 4/12/2023.  This showed largely mild nonobstructive disease and patent stents.  He returned to the office and was seen by CAYLA Reyes on 4/19/2023 at which time he denied any new complaints.  She recommended considering a pulmonary consult at that time.     I saw the patient last in 10/2023 he was continuing to complain of shortness of breath.  He felt that it was still due to deconditioning.  He returned in 4/2024 and was seen by CAYLA Crawford.  Shortness of breath was largely stable.  No changes were made.    He presents back today for follow-up.  He was started on Anoro inhaler daily by Dr. Ballesteros.  As a result he reports significant improvement in his shortness of breath.  He reports a couple of episodes of fluttering in his chest first occurring about 3  weeks ago the second occurring 2 weeks ago.  The first episode was at associated with some chest discomfort different from his prior anginal symptoms.  He has had no further episodes since then.  The longest episode was about 10 minutes.  He denies any associated lightheadedness, shortness of breath.  He continues to play golf regularly without any significant issues.  He does note some left ankle swelling that is mild and relatively stable at this time.    It appears that he did undergo a sleep study which showed severe sleep apnea when supine and less when he is laying on his left side.  He was evaluated by Dr. Reyes but the patient declined therapy with CPAP at the time.     Prior History:  I began following the patient when he presented on 9/28/2017 with findings of a lateral myocardial infarction.  Patient was out playing golf and while at the 16th hole began developing chest pressure since he developed diaphoresis and nausea.  EKG performed in the field showed evidence of lateral ST elevations.  In route the patient did suffer a ventricular fibrillation arrest that did respond to defibrillation.  He was brought emergently to the cardiac catheterization laboratory where he was found to have an occlusion of his first diagonal branch and an 80% stenosis of his mid LAD.  He subsequently underwent drug-eluting stent placement of both.  His initial echocardiogram showed low normal left ventricular systolic function with an ejection fraction of 48% with apical akinesis, and no significant valvular disease.       In follow up he had a repeat echocardiogram performed in 11/2017 that showed normalization of his left ventricular systolic function and wall motion with an estimated ejection fraction of 64%, and no significant valvular disease.  Since then during his last couple of follow ups he has complained of dyspnea on exertion that on his recent follow up he felt like was worsening.  He also admitted to weight gain  over the last year.  During his last office visit I decided to try switching his ticagrelor to clopiodgrel to see if this would help his symptoms.  I also recommended that he work on weight loss.  When he returned for his last follow up in 12/2018 he denied any further worsening of his shortness of breath.  His blood pressures were elevated but he admitted to increased stress so we decided to monitor his symptoms.      In 3/2019 he presented for urgent evaluation of chest heaviness.  He was replacing his old washer and dryer and doing a lot of the moving himself.  He reports that when he sat down after finishing his work he noticed severe chest heaviness across his chest.  He reported that it felt similar and may be even a little worse than what he had at the time of his MI.  This was associated with shortness of breath.  He took his blood pressure around that time was noted that it was elevated.  Following that office visit I set the patient up for a stress test and an echocardiogram.  Stress test was performed on 3/18/2019 and showed no evidence of ischemia.  Echocardiogram performed on the same day showed normal left ventricular systolic function and wall motion with an EF of 66% and no significant valvular disease.     In 4/2019 he was noted to be bradycardic on a low dose of metoprolol tartrate.  He was asymptomatic but since he was on such a low dose I opted to discontinue the medication. At a telephone follow up in 4/2020 he reported his blood pressures were elevated off of the metoprolol.  At that time I recommended increasing his lisinopril to 40 mg a day.       In 2/2020 was noted to have elevated blood pressures so amlodipine 5 mg daily was added to his regimen with improvement in his pressures.     In 6/2021 he was continuing to complain of dyspnea on exertion routine follow-up with CAYLA Morris.  A stress echocardiogram was ordered at that time and performed on 6/30/2021.  This showed normal left  ventricular systolic function wall motion with an EF of 58% and no significant valvular disease.  Stress portion showed no evidence of ischemia.    Review of Systems   Constitutional: Negative for malaise/fatigue.   HENT:  Negative for hearing loss, hoarse voice, nosebleeds and sore throat.    Eyes:  Negative for pain.   Cardiovascular:  Positive for dyspnea on exertion, leg swelling and palpitations. Negative for chest pain, claudication, cyanosis, irregular heartbeat, near-syncope, orthopnea, paroxysmal nocturnal dyspnea and syncope.   Respiratory:  Negative for shortness of breath and snoring.    Endocrine: Negative for cold intolerance, heat intolerance, polydipsia, polyphagia and polyuria.   Skin:  Negative for itching and rash.   Musculoskeletal:  Negative for arthritis, falls, joint pain, joint swelling, muscle cramps, muscle weakness and myalgias.   Gastrointestinal:  Negative for constipation, diarrhea, dysphagia, heartburn, hematemesis, hematochezia, melena, nausea and vomiting.   Genitourinary:  Negative for frequency, hematuria and hesitancy.   Neurological:  Negative for excessive daytime sleepiness, dizziness, headaches, light-headedness, numbness and weakness.   Psychiatric/Behavioral:  Negative for depression. The patient is not nervous/anxious.          Current Outpatient Medications:     amLODIPine (NORVASC) 5 MG tablet, Take 1 tablet by mouth Daily., Disp: 90 tablet, Rfl: 3    Anoro Ellipta 62.5-25 MCG/ACT aerosol powder  inhaler, Inhale 1 puff Daily., Disp: , Rfl:     aspirin 81 MG EC tablet, Take 1 tablet by mouth Daily., Disp: , Rfl:     atorvastatin (LIPITOR) 40 MG tablet, Take 1 tablet by mouth Daily., Disp: 90 tablet, Rfl: 3    clopidogrel (PLAVIX) 75 MG tablet, Take 1 tablet by mouth once daily, Disp: 90 tablet, Rfl: 0    indomethacin (INDOCIN) 50 MG capsule, Take 1 capsule by mouth 2 (Two) Times a Day With Meals., Disp: , Rfl:     lisinopril (PRINIVIL,ZESTRIL) 40 MG tablet, Take 1 tablet  by mouth once daily, Disp: 90 tablet, Rfl: 3    Loratadine 10 MG capsule, Take  by mouth., Disp: , Rfl:     nitroglycerin (NITROSTAT) 0.4 MG SL tablet, 1 under the tongue as needed for angina, may repeat q5mins for up three doses, Disp: 25 tablet, Rfl: 0    pantoprazole (PROTONIX) 40 MG EC tablet, TAKE ONE TABLET BY MOUTH ONCE DAILY, Disp: 90 tablet, Rfl: 1    tamsulosin (FLOMAX) 0.4 MG capsule 24 hr capsule, , Disp: , Rfl:     Past Medical History:   Diagnosis Date    Acute myocardial infarction     lateral wall    Bradycardia     Chest discomfort     Coronary artery disease     Hyperlipidemia     Hypertension     Hypoxemia associated with sleep     CASPER (obstructive sleep apnea) 05/06/2024    Home sleep study.  Weight 234 pounds.  Severe CASPER with AHI 40.5 events per hour.  When supine 47.3 events per hour.  On his left side, mildly abnormal 6.6 events per hour.  Low oxygen saturation 70% and sleep-related hypoxia present for 85.9 minutes.  The patient snored 42% of total monitoring time.    Ventricular fibrillation        Past Surgical History:   Procedure Laterality Date    APPENDECTOMY      CARDIAC CATHETERIZATION N/A 9/28/2017    Procedure: Left Heart Cath;  Surgeon: Brianna Avila MD;  Location: [facility] CATH INVASIVE LOCATION;  Service:     CARDIAC CATHETERIZATION N/A 9/28/2017    Procedure: Coronary angiography;  Surgeon: Brianna Avila MD;  Location: [facility] INVASIVE LOCATION;  Service:     CARDIAC CATHETERIZATION N/A 9/28/2017    Procedure: Stent PERLITA coronary;  Surgeon: Brianna Avila MD;  Location: [facility] CATH INVASIVE LOCATION;  Service:     CARDIAC CATHETERIZATION N/A 9/28/2017    Procedure: Left ventriculography;  Surgeon: Brianna Avila MD;  Location: [facility] CATH INVASIVE LOCATION;  Service:     CARDIAC CATHETERIZATION N/A 4/12/2023    Procedure: Left Heart Cath;  Surgeon: Santi Maya MD;  Location: [facility] CATH INVASIVE LOCATION;  Service: Cardiology;  Laterality: N/A;    CARDIAC CATHETERIZATION  "N/A 4/12/2023    Procedure: Coronary angiography;  Surgeon: Santi Maya MD;  Location:  [facility] CATH INVASIVE LOCATION;  Service: Cardiology;  Laterality: N/A;    CORONARY ANGIOPLASTY      CORONARY STENT PLACEMENT      DC RT/LT HEART CATHETERS N/A 9/28/2017    Procedure: Percutaneous Coronary Intervention;  Surgeon: Brianna Avila MD;  Location:  [facility] CATH INVASIVE LOCATION;  Service: Cardiovascular       Family History   Problem Relation Age of Onset    Heart disease Mother     Heart attack Mother     Heart attack Father     Heart disease Father     Parkinsonism Father     No Known Problems Sister     No Known Problems Maternal Grandmother     No Known Problems Maternal Grandfather     No Known Problems Paternal Grandmother     No Known Problems Paternal Grandfather        Social History     Tobacco Use    Smoking status: Former     Current packs/day: 1.50     Types: Cigarettes     Passive exposure: Never    Smokeless tobacco: Never    Tobacco comments:     \"over 5 years ago\"    Vaping Use    Vaping status: Never Used   Substance Use Topics    Alcohol use: Yes     Alcohol/week: 2.0 standard drinks of alcohol     Types: 1 Cans of beer, 1 Shots of liquor per week     Comment: 6 weekly    Drug use: No         ECG 12 Lead    Date/Time: 11/1/2024 11:40 AM  Performed by: Brianna Avila MD    Authorized by: Brianna Avila MD  Comparison: compared with previous ECG   Similar to previous ECG  Rhythm: sinus rhythm             Objective:     Visit Vitals  /70 (BP Location: Left arm, Patient Position: Sitting, Cuff Size: Adult)   Pulse 72   Ht 177.8 cm (70\")   Wt 104 kg (230 lb)   SpO2 96%   BMI 33.00 kg/m²         Constitutional:       Appearance: Normal appearance. Well-developed.   HENT:      Head: Normocephalic and atraumatic.   Neck:      Vascular: No carotid bruit or JVD.   Pulmonary:      Effort: Pulmonary effort is normal.      Breath sounds: Normal breath sounds.   Cardiovascular:      Normal rate. Regular " rhythm.      No gallop.    Pulses:     Radial: 2+ bilaterally.  Edema:     Peripheral edema absent.   Abdominal:      Palpations: Abdomen is soft.   Skin:     General: Skin is warm and dry.   Neurological:      Mental Status: Alert and oriented to person, place, and time.             Assessment:          Diagnosis Plan   1. Coronary artery disease involving native coronary artery of native heart without angina pectoris        2. History of coronary artery stent placement        3. Hyperlipidemia, unspecified hyperlipidemia type        4. Primary hypertension        5. Obstructive sleep apnea               Plan:         1.  Palpitations.  He has had 2 episodes total but not in the last couple of weeks.  1 episode associated with atypical chest pain.  Will monitor for now and consider monitor if he has recurrent episodes.  2.  Coronary artery disease.  Appears to be stable and asymptomatic.  Continue current medical management.  3.  Hyperlipidemia.  On atorvastatin for goal DL of 70 or below.  His last lipid panel was at goal at 69.  4.  Obstructive sleep apnea.  The patient declines treatment with CPAP.  5.  Left ankle swelling.  Mild on exam.  May be related to amlodipine although unusual that is only in 1 ankle.  Recommend just monitoring for now.    Will plan on seeing the patient back again in 6 months.

## 2024-11-01 ENCOUNTER — OFFICE VISIT (OUTPATIENT)
Age: 74
End: 2024-11-01
Payer: MEDICARE

## 2024-11-01 VITALS
OXYGEN SATURATION: 96 % | BODY MASS INDEX: 32.93 KG/M2 | SYSTOLIC BLOOD PRESSURE: 133 MMHG | WEIGHT: 230 LBS | HEART RATE: 72 BPM | HEIGHT: 70 IN | DIASTOLIC BLOOD PRESSURE: 70 MMHG

## 2024-11-01 DIAGNOSIS — I25.10 CORONARY ARTERY DISEASE INVOLVING NATIVE CORONARY ARTERY OF NATIVE HEART WITHOUT ANGINA PECTORIS: Primary | ICD-10-CM

## 2024-11-01 DIAGNOSIS — Z95.5 HISTORY OF CORONARY ARTERY STENT PLACEMENT: ICD-10-CM

## 2024-11-01 DIAGNOSIS — G47.33 OBSTRUCTIVE SLEEP APNEA: ICD-10-CM

## 2024-11-01 DIAGNOSIS — I10 PRIMARY HYPERTENSION: ICD-10-CM

## 2024-11-01 DIAGNOSIS — E78.5 HYPERLIPIDEMIA, UNSPECIFIED HYPERLIPIDEMIA TYPE: ICD-10-CM

## 2024-11-01 PROCEDURE — 3075F SYST BP GE 130 - 139MM HG: CPT | Performed by: INTERNAL MEDICINE

## 2024-11-01 PROCEDURE — 93000 ELECTROCARDIOGRAM COMPLETE: CPT | Performed by: INTERNAL MEDICINE

## 2024-11-01 PROCEDURE — 1159F MED LIST DOCD IN RCRD: CPT | Performed by: INTERNAL MEDICINE

## 2024-11-01 PROCEDURE — 1160F RVW MEDS BY RX/DR IN RCRD: CPT | Performed by: INTERNAL MEDICINE

## 2024-11-01 PROCEDURE — 3078F DIAST BP <80 MM HG: CPT | Performed by: INTERNAL MEDICINE

## 2024-11-01 PROCEDURE — 99214 OFFICE O/P EST MOD 30 MIN: CPT | Performed by: INTERNAL MEDICINE

## 2024-11-01 RX ORDER — UMECLIDINIUM BROMIDE AND VILANTEROL TRIFENATATE 62.5; 25 UG/1; UG/1
1 POWDER RESPIRATORY (INHALATION) DAILY
COMMUNITY

## 2024-11-12 RX ORDER — AMLODIPINE BESYLATE 5 MG/1
5 TABLET ORAL DAILY
Qty: 90 TABLET | Refills: 1 | Status: SHIPPED | OUTPATIENT
Start: 2024-11-12

## 2024-11-27 DIAGNOSIS — I25.10 CORONARY ARTERY DISEASE INVOLVING NATIVE CORONARY ARTERY OF NATIVE HEART WITHOUT ANGINA PECTORIS: ICD-10-CM

## 2024-11-27 RX ORDER — CLOPIDOGREL BISULFATE 75 MG/1
75 TABLET ORAL DAILY
Qty: 90 TABLET | Refills: 1 | Status: SHIPPED | OUTPATIENT
Start: 2024-11-27

## 2024-11-27 RX ORDER — ATORVASTATIN CALCIUM 40 MG/1
40 TABLET, FILM COATED ORAL DAILY
Qty: 90 TABLET | Refills: 1 | Status: SHIPPED | OUTPATIENT
Start: 2024-11-27

## 2024-11-27 RX ORDER — LISINOPRIL 40 MG/1
TABLET ORAL
Qty: 90 TABLET | Refills: 1 | Status: SHIPPED | OUTPATIENT
Start: 2024-11-27

## 2025-05-05 RX ORDER — AMLODIPINE BESYLATE 5 MG/1
5 TABLET ORAL DAILY
Qty: 90 TABLET | Refills: 1 | Status: SHIPPED | OUTPATIENT
Start: 2025-05-05

## 2025-05-09 ENCOUNTER — OFFICE VISIT (OUTPATIENT)
Dept: CARDIOLOGY | Age: 75
End: 2025-05-09
Payer: MEDICARE

## 2025-05-09 VITALS
HEIGHT: 70 IN | HEART RATE: 60 BPM | DIASTOLIC BLOOD PRESSURE: 74 MMHG | WEIGHT: 230 LBS | BODY MASS INDEX: 32.93 KG/M2 | SYSTOLIC BLOOD PRESSURE: 130 MMHG

## 2025-05-09 DIAGNOSIS — I10 PRIMARY HYPERTENSION: Primary | ICD-10-CM

## 2025-05-09 DIAGNOSIS — E78.5 HYPERLIPIDEMIA, UNSPECIFIED HYPERLIPIDEMIA TYPE: ICD-10-CM

## 2025-05-09 DIAGNOSIS — I25.10 CORONARY ARTERY DISEASE INVOLVING NATIVE CORONARY ARTERY OF NATIVE HEART WITHOUT ANGINA PECTORIS: ICD-10-CM

## 2025-05-09 PROCEDURE — 1159F MED LIST DOCD IN RCRD: CPT | Performed by: NURSE PRACTITIONER

## 2025-05-09 PROCEDURE — 99214 OFFICE O/P EST MOD 30 MIN: CPT | Performed by: NURSE PRACTITIONER

## 2025-05-09 PROCEDURE — 3075F SYST BP GE 130 - 139MM HG: CPT | Performed by: NURSE PRACTITIONER

## 2025-05-09 PROCEDURE — 1160F RVW MEDS BY RX/DR IN RCRD: CPT | Performed by: NURSE PRACTITIONER

## 2025-05-09 PROCEDURE — 93000 ELECTROCARDIOGRAM COMPLETE: CPT | Performed by: NURSE PRACTITIONER

## 2025-05-09 PROCEDURE — 3078F DIAST BP <80 MM HG: CPT | Performed by: NURSE PRACTITIONER

## 2025-05-28 RX ORDER — LISINOPRIL 40 MG/1
40 TABLET ORAL DAILY
Qty: 90 TABLET | Refills: 0 | Status: SHIPPED | OUTPATIENT
Start: 2025-05-28

## 2025-06-18 RX ORDER — ATORVASTATIN CALCIUM 40 MG/1
40 TABLET, FILM COATED ORAL DAILY
Qty: 90 TABLET | Refills: 3 | Status: SHIPPED | OUTPATIENT
Start: 2025-06-18

## 2025-07-18 ENCOUNTER — TRANSCRIBE ORDERS (OUTPATIENT)
Dept: ADMINISTRATIVE | Facility: HOSPITAL | Age: 75
End: 2025-07-18
Payer: MEDICARE

## 2025-07-18 DIAGNOSIS — C61 PROSTATE CANCER: Primary | ICD-10-CM

## 2025-07-25 ENCOUNTER — HOSPITAL ENCOUNTER (OUTPATIENT)
Facility: HOSPITAL | Age: 75
Discharge: HOME | End: 2025-07-25
Payer: MEDICARE

## 2025-07-25 DIAGNOSIS — C61 PROSTATE CANCER: ICD-10-CM

## 2025-07-25 PROCEDURE — 76014 MR SFTY IMPLT&/FB ASMT STF 1: CPT

## 2025-07-25 PROCEDURE — A9577 INJ MULTIHANCE: HCPCS | Performed by: UROLOGY

## 2025-07-25 PROCEDURE — 72197 MRI PELVIS W/O & W/DYE: CPT

## 2025-07-25 PROCEDURE — 25510000002 GADOBENATE DIMEGLUMINE 529 MG/ML SOLUTION: Performed by: UROLOGY

## 2025-07-25 RX ADMIN — GADOBENATE DIMEGLUMINE 20 ML: 529 INJECTION, SOLUTION INTRAVENOUS at 15:32

## 2025-08-02 DIAGNOSIS — I25.10 CORONARY ARTERY DISEASE INVOLVING NATIVE CORONARY ARTERY OF NATIVE HEART WITHOUT ANGINA PECTORIS: ICD-10-CM

## 2025-08-04 RX ORDER — CLOPIDOGREL BISULFATE 75 MG/1
75 TABLET ORAL DAILY
Qty: 90 TABLET | Refills: 0 | Status: SHIPPED | OUTPATIENT
Start: 2025-08-04

## 2025-08-25 RX ORDER — LISINOPRIL 40 MG/1
40 TABLET ORAL DAILY
Qty: 90 TABLET | Refills: 1 | Status: SHIPPED | OUTPATIENT
Start: 2025-08-25

## (undated) DEVICE — BND PRESS RADL COMFRT 14IN STRL

## (undated) DEVICE — 6F .070 XB 3.5 100CM: Brand: VISTA BRITE TIP

## (undated) DEVICE — TREK CORONARY DILATATION CATHETER 3.0 MM X 12 MM / RAPID-EXCHANGE: Brand: TREK

## (undated) DEVICE — HI-TORQUE BALANCE MIDDLEWEIGHT GUIDE WIRE .014 STRAIGHT TIP 3.0 CM X 190 CM: Brand: HI-TORQUE BALANCE MIDDLEWEIGHT

## (undated) DEVICE — PK CATH CARD 40

## (undated) DEVICE — NC TREK CORONARY DILATATION CATHETER 3.5 MM X 25 MM / RAPID-EXCHANGE: Brand: NC TREK

## (undated) DEVICE — Device: Brand: TWIN-PASS® DUAL ACCESS CATHETER

## (undated) DEVICE — CATH DIAG IMPULSE FL3.5 5F 100CM

## (undated) DEVICE — TR BAND RADIAL ARTERY COMPRESSION DEVICE: Brand: TR BAND

## (undated) DEVICE — NC TREK CORONARY DILATATION CATHETER 3.0 MM X 15 MM / RAPID-EXCHANGE: Brand: NC TREK

## (undated) DEVICE — RUNTHROUGH NS EXTRA FLOPPY PTCA GUIDEWIRE: Brand: RUNTHROUGH

## (undated) DEVICE — GW INQWIRE FC PTFE J/3MM .035 180

## (undated) DEVICE — GW EMR FIX EXCHG J STD .035 3MM 260CM

## (undated) DEVICE — KT MANIFLD CARDIAC

## (undated) DEVICE — GLIDESHEATH SLENDER STAINLESS STEEL KIT: Brand: GLIDESHEATH SLENDER

## (undated) DEVICE — GLIDESHEATH BASIC HYDROPHILIC COATED INTRODUCER SHEATH: Brand: GLIDESHEATH

## (undated) DEVICE — CATH VENT MIV RADL PIG ST TIP 5F 110CM

## (undated) DEVICE — CATH DIAG IMPULSE FR4 5F 100CM

## (undated) DEVICE — CATH ASPIR EXPORTADVANCE 6F .014IN 140CM

## (undated) DEVICE — TREK CORONARY DILATATION CATHETER 2.50 MM X 12 MM / RAPID-EXCHANGE: Brand: TREK

## (undated) DEVICE — CATH DIAG DXTERITY ULTRA TRANSRADIAL 4.0 5F 100CM 0/SH

## (undated) DEVICE — DEV INDEFLATOR